# Patient Record
Sex: FEMALE | Race: WHITE | NOT HISPANIC OR LATINO | Employment: OTHER | URBAN - METROPOLITAN AREA
[De-identification: names, ages, dates, MRNs, and addresses within clinical notes are randomized per-mention and may not be internally consistent; named-entity substitution may affect disease eponyms.]

---

## 2017-01-05 ENCOUNTER — COMMUNICATION - HEALTHEAST (OUTPATIENT)
Dept: SURGERY | Facility: CLINIC | Age: 62
End: 2017-01-05

## 2017-01-20 ENCOUNTER — OFFICE VISIT - HEALTHEAST (OUTPATIENT)
Dept: SURGERY | Facility: CLINIC | Age: 62
End: 2017-01-20

## 2017-01-20 DIAGNOSIS — R63.2 HYPERPHAGIA: ICD-10-CM

## 2017-01-20 DIAGNOSIS — K91.2 POSTOPERATIVE MALABSORPTION: ICD-10-CM

## 2017-01-20 DIAGNOSIS — R73.09 ELEVATED HEMOGLOBIN A1C: ICD-10-CM

## 2017-01-20 ASSESSMENT — MIFFLIN-ST. JEOR: SCORE: 1552.76

## 2017-02-07 ENCOUNTER — AMBULATORY - HEALTHEAST (OUTPATIENT)
Dept: SURGERY | Facility: CLINIC | Age: 62
End: 2017-02-07

## 2017-02-07 DIAGNOSIS — E11.9 TYPE 2 DIABETES MELLITUS (H): ICD-10-CM

## 2017-02-21 ENCOUNTER — AMBULATORY - HEALTHEAST (OUTPATIENT)
Dept: SURGERY | Facility: CLINIC | Age: 62
End: 2017-02-21

## 2017-02-21 DIAGNOSIS — E11.9 TYPE 2 DIABETES MELLITUS (H): ICD-10-CM

## 2017-02-21 DIAGNOSIS — R73.09 ELEVATED HEMOGLOBIN A1C: ICD-10-CM

## 2017-02-28 ENCOUNTER — COMMUNICATION - HEALTHEAST (OUTPATIENT)
Dept: SURGERY | Facility: CLINIC | Age: 62
End: 2017-02-28

## 2017-02-28 DIAGNOSIS — Z98.84 STATUS POST BARIATRIC SURGERY: ICD-10-CM

## 2017-05-01 ENCOUNTER — OFFICE VISIT (OUTPATIENT)
Dept: OPHTHALMOLOGY | Facility: CLINIC | Age: 62
End: 2017-05-01

## 2017-05-01 VITALS — HEIGHT: 64 IN | BODY MASS INDEX: 35.85 KG/M2 | WEIGHT: 210 LBS

## 2017-05-01 DIAGNOSIS — G43.719 INTRACTABLE CHRONIC MIGRAINE WITHOUT AURA AND WITHOUT STATUS MIGRAINOSUS: ICD-10-CM

## 2017-05-01 DIAGNOSIS — H02.839 DERMATOCHALASIS: Primary | ICD-10-CM

## 2017-05-01 DIAGNOSIS — H02.413 MECHANICAL PTOSIS, BILATERAL: ICD-10-CM

## 2017-05-01 RX ORDER — POLYETHYLENE GLYCOL 3350 17 G/17G
17 POWDER, FOR SOLUTION ORAL EVERY MORNING
COMMUNITY
Start: 2016-12-22

## 2017-05-01 RX ORDER — MAGNESIUM 200 MG
TABLET ORAL
COMMUNITY
Start: 2015-01-09

## 2017-05-01 RX ORDER — BIOTIN 10 MG
2 TABLET ORAL EVERY MORNING
COMMUNITY
Start: 2013-05-23

## 2017-05-01 RX ORDER — TRAMADOL HYDROCHLORIDE 50 MG/1
50 TABLET ORAL EVERY 8 HOURS PRN
COMMUNITY
Start: 2015-12-30 | End: 2023-07-28

## 2017-05-01 RX ORDER — PROCHLORPERAZINE MALEATE 5 MG
5 TABLET ORAL
COMMUNITY
Start: 2016-12-30

## 2017-05-01 RX ORDER — BUTALBITAL, ASPIRIN AND CAFFEINE 50; 325; 40 MG/1; MG/1; MG/1
TABLET ORAL
COMMUNITY
Start: 2016-12-29 | End: 2023-03-24

## 2017-05-01 RX ORDER — CYCLOBENZAPRINE HCL 10 MG
TABLET ORAL
COMMUNITY
Start: 2016-12-22 | End: 2022-05-04

## 2017-05-01 RX ORDER — CODEINE PHOSPHATE/GUAIFENESIN 10-100MG/5
5 LIQUID (ML) ORAL
COMMUNITY
Start: 2014-12-23 | End: 2023-03-24

## 2017-05-01 RX ORDER — MECLIZINE HCL 12.5 MG 12.5 MG/1
12.5 TABLET ORAL
COMMUNITY
Start: 2016-09-20 | End: 2017-07-18

## 2017-05-01 RX ORDER — ASPIRIN 81 MG/1
81 TABLET ORAL
COMMUNITY
Start: 2010-12-28 | End: 2023-03-24

## 2017-05-01 RX ORDER — ATORVASTATIN CALCIUM 40 MG/1
40 TABLET, FILM COATED ORAL
COMMUNITY
Start: 2016-12-22 | End: 2022-05-04

## 2017-05-01 RX ORDER — OXYCODONE HYDROCHLORIDE 5 MG/1
5 TABLET ORAL EVERY 6 HOURS PRN
COMMUNITY
Start: 2016-07-17 | End: 2023-03-24

## 2017-05-01 RX ORDER — LISINOPRIL 20 MG/1
20 TABLET ORAL EVERY MORNING
COMMUNITY
Start: 2016-12-22 | End: 2022-05-04

## 2017-05-01 ASSESSMENT — MARGIN REFLEX DISTANCE
OD_MRD1: 2.5
OS_MRD1: 2.5

## 2017-05-01 ASSESSMENT — TONOMETRY
OS_IOP_MMHG: 12
IOP_METHOD: ICARE
OD_IOP_MMHG: 14

## 2017-05-01 ASSESSMENT — CONF VISUAL FIELD
OS_NORMAL: 1
METHOD: COUNTING FINGERS
OD_NORMAL: 1

## 2017-05-01 ASSESSMENT — VISUAL ACUITY
OS_SC+: -2
METHOD: SNELLEN - LINEAR
OD_SC+: -1
OD_SC: 20/20
OS_SC: 20/20

## 2017-05-01 ASSESSMENT — EXTERNAL EXAM - LEFT EYE: OS_EXAM: NORMAL

## 2017-05-01 ASSESSMENT — EXTERNAL EXAM - RIGHT EYE: OD_EXAM: NORMAL

## 2017-05-01 ASSESSMENT — LAGOPHTHALMOS
OD_LAGOPHTHALMOS: 0
OS_LAGOPHTHALMOS: 0

## 2017-05-01 NOTE — PATIENT INSTRUCTIONS
BLEPHAROPLASTY    Your eyes are often the first thing people notice about you and are an important aspect of your overall appearance. As we age, the tone and shape of our eyelids can loosen and sag. Heredity and sun exposure also contribute to this process. This excess, puffy or lax skin can make you appear more tired or appear older. Eyelid surgery or blepharoplasty (pronounced  gbaf-z-lo-plasty ) can give the eyes a more youthful look by removing excess skin, bulging fat, and lax muscle from the upper or lower eyelids. If the sagging upper eyelid skin obstructs peripheral vision, blepharoplasty can eliminate the obstruction and expand the visual field.     Upper Blepharoplasty     For the upper eyelids, excess skin and fat are removed through an incision hidden in the natural eyelid crease. If the lid is droopy (ptosis), the muscle that raises the upper eyelid can be tightened. The incision is then closed with fine sutures.     Lower Blepharoplasty     Fat in the lower eyelids can be removed or repositioned through an incision hidden on the inner surface of the eyelid.  If there is excessive skin in the lower lid, the skin can be removed through incision is made just below the lashes. Fat can be removed or repositioned through this incision, and the excess skin removed. The incision is then closed with fine sutures.     Upper and Lower Blepharoplasty     Upper and lower blepharoplasty can be performed together and also can be combined with other procedures such as eyebrow or forehead lift, midface lift, face lift, neck lift, or laser skin resurfacing.  The procedures are typically performed as an outpatient procedure and typically take 45 min to 1.5 hours to perform.  Most patients can return to normal activities within 1-2 weeks. Makeup may be worn to camouflage any bruising after one week.       Who Should Perform A Blepharoplasty?     When choosing a surgeon to perform blepharoplasty, look for a cosmetic and  reconstructive surgeon who specializes in the eyelids, orbit, and tear drain system. Dr. Dominguez s membership in the American Society of Ophthalmic Plastic and Reconstructive Surgery (ASOPRS) indicates he is not only a board certified ophthalmologist who knows the anatomy and structure of the eyelids and orbit, but also has had extensive training in ophthalmic plastic reconstructive and cosmetic surgery.

## 2017-05-01 NOTE — PROGRESS NOTES
Oculoplastic Clinic New Patient    Patient: Corby Bradley MRN# [id number]   YOB: 1955 Age: 61 year old   Date of Visit: May 1, 2017    CC: Droopy eyelids obstructing vision.  Chief Complaints and History of Present Illnesses   Patient presents with     Dermatochalasis Evaluation                 HPI:     Corby Bradley is a 61 year old female who has noted gradual onset of droopy eyelids over the past years. The droopy eyelid is interfering with activities of daily living including driving, and reading. The patient denies double vision, variability of the eyelid position, or significant dry eye symptoms. Not using ATs currently. Pt was referred by Katerin Terry, who told her that Dr. Dominguez's lid surgery could help with lid issues and possibly help with migraines as well. Pt reports hx of migraine headaches but denies any other significant PMH or POH.     Hx of LASIK and microblading of brows. No other eye/eyelid surgeries.     EXAM:     MRD1: 2.5/2.5  Dermatochalasis with excess skin touching eyelashes     VISUAL FIELD:  Right eye untaped: 10 degrees Right eye taped: 43 degrees  Left eye untaped: 7 degrees Left eye taped: 43 degrees    PHOTOS DEMONSTRATE:  Significant dermatochalasis with lids resting on eyelashes and obstructing visual axis      ANTICOAGULATION:  Aspirin 81mg daily  Supplements - fish oil, biotin, calcium, vit B, multivitamin    Can hold prior to surgery - YES  Will cc PCP regarding holding anticoagulation    Assessment & Plan     Corby Bradley is a 61 year old female with the following diagnoses:   1. Dermatochalasis    2. Mechanical ptosis, bilateral    3. Intractable chronic migraine without aura and without status migrainosus      PLAN:  Bilateral upper blepharoplasty (skin+nfp+brassiere)         Attending Physician Attestation:  I have seen and examined this patient .  I have confirmed and edited as necessary the chief complaint(s), history of present illness, review of  systems, relevant history, and examination findings as documented by others.  I have personally reviewed the relevant tests, images, and reports as documented above.  I have confirmed and edited as necessary the assessment and plan and agree with this note.    - Sourav Dominguez MD 10:15 AM 5/1/2017    Photographs were obtained to document the findings recorded under exam. These will be stored for future reference and comparison. The plan is documented under assessment and plan above.   - Sourav Dominguez MD 10:15 AM 5/1/2017    Today with Corby Bradley, I reviewed the indications, risks, benefits, and alternatives of the proposed surgical procedure including, but not limited to, failure obtain the desired result  and need for additional surgery, bleeding, infection, loss of vision, loss of the eye, and the remote possibility of permanent damage to any organ system or death with the use of anesthesia.  I provided multiple opportunities for the questions, answered all questions to the best of my ability, and confirmed that my answers and my discussion were understood.   - Sourav Dominguez MD 10:15 AM 5/1/2017

## 2017-05-01 NOTE — LETTER
2017         RE:  :  MRN: Corby Bradley  1955  [id number]     Dear Dr. Coni Brasher,    Thank you for asking me to see your patient, Corby rBadley, for an oculoplastic   consultation.  My assessment and plan are below.  For further details, please see my attached clinic note.      Assessment & Plan     Corby Bradley is a 61 year old female with the following diagnoses:   1. Dermatochalasis    2. Mechanical ptosis, bilateral    3. Intractable chronic migraine without aura and without status migrainosus      PLAN:  Bilateral upper blepharoplasty      Again, thank you for allowing me to participate in the care of your patient.      Sincerely,    Sourav Dominguez MD  Department of Ophthalmology and Visual Neurosciences  [facility]    CC: Alyson You  [facility] [address]  VIA Mail

## 2017-05-01 NOTE — MR AVS SNAPSHOT
After Visit Summary   5/1/2017    Corby Bradley    MRN: [id number]           Patient Information     Date Of Birth          1955        Visit Information        Provider Department      5/1/2017 9:00 AM Sourav Dominguez MD [facility] Ophthalmology        Today's Diagnoses     Dermatochalasis    -  1    Mechanical ptosis, bilateral        Intractable chronic migraine without aura and without status migrainosus          Care Instructions    BLEPHAROPLASTY    Your eyes are often the first thing people notice about you and are an important aspect of your overall appearance. As we age, the tone and shape of our eyelids can loosen and sag. Heredity and sun exposure also contribute to this process. This excess, puffy or lax skin can make you appear more tired or appear older. Eyelid surgery or blepharoplasty (pronounced  jzyf-n-bd-plasty ) can give the eyes a more youthful look by removing excess skin, bulging fat, and lax muscle from the upper or lower eyelids. If the sagging upper eyelid skin obstructs peripheral vision, blepharoplasty can eliminate the obstruction and expand the visual field.     Upper Blepharoplasty     For the upper eyelids, excess skin and fat are removed through an incision hidden in the natural eyelid crease. If the lid is droopy (ptosis), the muscle that raises the upper eyelid can be tightened. The incision is then closed with fine sutures.     Lower Blepharoplasty     Fat in the lower eyelids can be removed or repositioned through an incision hidden on the inner surface of the eyelid.  If there is excessive skin in the lower lid, the skin can be removed through incision is made just below the lashes. Fat can be removed or repositioned through this incision, and the excess skin removed. The incision is then closed with fine sutures.     Upper and Lower Blepharoplasty     Upper and lower blepharoplasty can be performed together and also can be combined with other procedures such  as eyebrow or forehead lift, midface lift, face lift, neck lift, or laser skin resurfacing.  The procedures are typically performed as an outpatient procedure and typically take 45 min to 1.5 hours to perform.  Most patients can return to normal activities within 1-2 weeks. Makeup may be worn to camouflage any bruising after one week.       Who Should Perform A Blepharoplasty?     When choosing a surgeon to perform blepharoplasty, look for a cosmetic and reconstructive surgeon who specializes in the eyelids, orbit, and tear drain system. Dr. Dominguez s membership in the American Society of Ophthalmic Plastic and Reconstructive Surgery (ASOPRS) indicates he is not only a board certified ophthalmologist who knows the anatomy and structure of the eyelids and orbit, but also has had extensive training in ophthalmic plastic reconstructive and cosmetic surgery.            Follow-ups after your visit        Who to contact     Please call your clinic at [phone] to:    Ask questions about your health    Make or cancel appointments    Discuss your medicines    Learn about your test results    Speak to your doctor   If you have compliments or concerns about an experience at your clinic, or if you wish to file a complaint, please contact [facility] Patient Relations at [phone] or email us at [email or web address]         Additional Information About Your Visit        Owlparrot Information     Owl[employer]rot gives you secure access to your electronic health record. If you see a primary care provider, you can also send messages to your care team and make appointments. If you have questions, please call your primary care clinic.  If you do not have a primary care provider, please call [phone] and they will assist you.      Owl[employer]rot is an electronic gateway that provides easy, online access to your medical records. With [employer], you can request a clinic appointment, read your test results,  "renew a prescription or communicate with your care team.     To access your existing account, please contact your [facility] or call [phone] for assistance.        Care EveryWhere ID     This is your Care EveryWhere ID. This could be used by other organizations to access your [place] medical records  [id number]        Your Vitals Were     Height BMI (Body Mass Index)                1.626 m (5' 4\") 36.05 kg/m2           Blood Pressure from Last 3 Encounters:   No data found for BP    Weight from Last 3 Encounters:   05/01/17 95.3 kg (210 lb)              We Performed the Following     External Photos OU (both eyes)     [place] VF Ptosis OU     [place]-Operative Worksheet (Plastics)        Primary Care Provider    None Specified       No primary provider on file.        Thank you!     Thank you for choosing [facility] OPHTHALMOLOGY  for your care. Our goal is always to provide you with excellent care. Hearing back from our patients is one way we can continue to improve our services. Please take a few minutes to complete the written survey that you may receive in the mail after your visit with us. Thank you!             Your Updated Medication List - Protect others around you: Learn how to safely use, store and throw away your medicines at www.disposemymeds.org.          This list is accurate as of: 5/1/17 10:19 AM.  Always use your most recent med list.                   Brand Name Dispense Instructions for use    aspirin EC 81 MG EC tablet      Take 81 mg by mouth       atorvastatin 40 MG tablet    LIPITOR     Take 40 mg by mouth       BIOTIN PO      Take 500 mcg by mouth       butalbital-aspirin-caffeine -40 MG Tabs per tablet    FIORINAL EQUIV     Take 1-2 tablets by mouth 1-2 time daily       Calcium Carb-Cholecalciferol 600-800 MG-UNIT Chew      Take 2 tablets by mouth       cyanocobalamin 1000 MCG Subl sublingual tablet      Take 1 tablet every other day.       " cyclobenzaprine 10 MG tablet    FLEXERIL     TAKE 1 TABLET AT BEDTIME FOR FIBROMYALGIA  AS NEEDED       guaiFENesin-codeine 100-10 MG/5ML Syrp syrup    guaiFENesin AC     Take 5 mLs by mouth       lisinopril 20 MG tablet    PRINIVIL/ZESTRIL     Take 20 mg by mouth       meclizine 12.5 MG tablet    ANTIVERT     Take 12.5 mg by mouth       metroNIDAZOLE 0.75 % cream    METROCREAM         MULTIVITAMIN ADULT Chew      Take 2 tablets by mouth       omeprazole 20 MG CR capsule    priLOSEC     Take 20 mg by mouth       oxyCODONE 5 MG IR tablet    ROXICODONE     Take 5 mg by mouth       polyethylene glycol powder    MIRALAX/GLYCOLAX     17 g       prochlorperazine 5 MG tablet    COMPAZINE     Take 5 mg by mouth       traMADol 50 MG tablet    ULTRAM

## 2017-05-01 NOTE — NURSING NOTE
Chief Complaints and History of Present Illnesses   Patient presents with     Dermatochalasis Evaluation     HPI    Affected eye(s):  Both   Symptoms:     No blurred vision      Frequency:  Constant       Do you have eye pain now?:  No      Comments:  Pt states has had droopy lids and would like it fixed, pt states it could cause some of her migraines too. No drops.    Mendy Yeung COT 9:02 AM May 1, 2017

## 2017-07-18 ENCOUNTER — ANESTHESIA EVENT (OUTPATIENT)
Dept: SURGERY | Facility: AMBULATORY SURGERY CENTER | Age: 62
End: 2017-07-18

## 2017-07-19 ENCOUNTER — SURGERY (OUTPATIENT)
Age: 62
End: 2017-07-19

## 2017-07-19 ENCOUNTER — HOSPITAL ENCOUNTER (OUTPATIENT)
Facility: AMBULATORY SURGERY CENTER | Age: 62
End: 2017-07-19
Attending: OPHTHALMOLOGY

## 2017-07-19 ENCOUNTER — ANESTHESIA (OUTPATIENT)
Dept: SURGERY | Facility: AMBULATORY SURGERY CENTER | Age: 62
End: 2017-07-19

## 2017-07-19 VITALS
RESPIRATION RATE: 16 BRPM | HEIGHT: 64 IN | HEART RATE: 75 BPM | TEMPERATURE: 97.2 F | OXYGEN SATURATION: 95 % | SYSTOLIC BLOOD PRESSURE: 135 MMHG | BODY MASS INDEX: 35.85 KG/M2 | WEIGHT: 210 LBS | DIASTOLIC BLOOD PRESSURE: 86 MMHG

## 2017-07-19 DIAGNOSIS — Z98.890 POSTOPERATIVE EYE STATE: Primary | ICD-10-CM

## 2017-07-19 RX ORDER — HYDROCODONE BITARTRATE AND ACETAMINOPHEN 5; 325 MG/1; MG/1
1 TABLET ORAL EVERY 6 HOURS PRN
Qty: 10 TABLET | Refills: 0 | Status: SHIPPED | OUTPATIENT
Start: 2017-07-19 | End: 2023-03-24

## 2017-07-19 RX ORDER — ONDANSETRON 2 MG/ML
INJECTION INTRAMUSCULAR; INTRAVENOUS PRN
Status: DISCONTINUED | OUTPATIENT
Start: 2017-07-19 | End: 2017-07-19

## 2017-07-19 RX ORDER — SODIUM CHLORIDE, SODIUM LACTATE, POTASSIUM CHLORIDE, CALCIUM CHLORIDE 600; 310; 30; 20 MG/100ML; MG/100ML; MG/100ML; MG/100ML
INJECTION, SOLUTION INTRAVENOUS CONTINUOUS
Status: DISCONTINUED | OUTPATIENT
Start: 2017-07-19 | End: 2017-07-20 | Stop reason: HOSPADM

## 2017-07-19 RX ORDER — LIDOCAINE 40 MG/G
CREAM TOPICAL
Status: DISCONTINUED | OUTPATIENT
Start: 2017-07-19 | End: 2017-07-19 | Stop reason: HOSPADM

## 2017-07-19 RX ORDER — SODIUM CHLORIDE, SODIUM LACTATE, POTASSIUM CHLORIDE, CALCIUM CHLORIDE 600; 310; 30; 20 MG/100ML; MG/100ML; MG/100ML; MG/100ML
INJECTION, SOLUTION INTRAVENOUS CONTINUOUS
Status: DISCONTINUED | OUTPATIENT
Start: 2017-07-19 | End: 2017-07-19 | Stop reason: HOSPADM

## 2017-07-19 RX ORDER — LIDOCAINE HYDROCHLORIDE 20 MG/ML
INJECTION, SOLUTION INFILTRATION; PERINEURAL PRN
Status: DISCONTINUED | OUTPATIENT
Start: 2017-07-19 | End: 2017-07-19

## 2017-07-19 RX ORDER — ONDANSETRON 4 MG/1
4 TABLET, ORALLY DISINTEGRATING ORAL EVERY 30 MIN PRN
Status: DISCONTINUED | OUTPATIENT
Start: 2017-07-19 | End: 2017-07-20 | Stop reason: HOSPADM

## 2017-07-19 RX ORDER — NALOXONE HYDROCHLORIDE 0.4 MG/ML
.1-.4 INJECTION, SOLUTION INTRAMUSCULAR; INTRAVENOUS; SUBCUTANEOUS
Status: DISCONTINUED | OUTPATIENT
Start: 2017-07-19 | End: 2017-07-20 | Stop reason: HOSPADM

## 2017-07-19 RX ORDER — PROPOFOL 10 MG/ML
INJECTION, EMULSION INTRAVENOUS PRN
Status: DISCONTINUED | OUTPATIENT
Start: 2017-07-19 | End: 2017-07-19

## 2017-07-19 RX ORDER — ERYTHROMYCIN 5 MG/G
OINTMENT OPHTHALMIC PRN
Status: DISCONTINUED | OUTPATIENT
Start: 2017-07-19 | End: 2017-07-19 | Stop reason: HOSPADM

## 2017-07-19 RX ORDER — TETRACAINE HYDROCHLORIDE 5 MG/ML
SOLUTION OPHTHALMIC PRN
Status: DISCONTINUED | OUTPATIENT
Start: 2017-07-19 | End: 2017-07-19 | Stop reason: HOSPADM

## 2017-07-19 RX ORDER — BUPIVACAINE HYDROCHLORIDE 5 MG/ML
INJECTION, SOLUTION PERINEURAL PRN
Status: DISCONTINUED | OUTPATIENT
Start: 2017-07-19 | End: 2017-07-19 | Stop reason: HOSPADM

## 2017-07-19 RX ORDER — ACETAMINOPHEN 325 MG/1
975 TABLET ORAL ONCE
Status: COMPLETED | OUTPATIENT
Start: 2017-07-19 | End: 2017-07-19

## 2017-07-19 RX ORDER — ERYTHROMYCIN 5 MG/G
OINTMENT OPHTHALMIC
Qty: 3.5 G | Refills: 0 | Status: SHIPPED | OUTPATIENT
Start: 2017-07-19

## 2017-07-19 RX ORDER — ONDANSETRON 2 MG/ML
4 INJECTION INTRAMUSCULAR; INTRAVENOUS EVERY 30 MIN PRN
Status: DISCONTINUED | OUTPATIENT
Start: 2017-07-19 | End: 2017-07-20 | Stop reason: HOSPADM

## 2017-07-19 RX ADMIN — ONDANSETRON 4 MG: 2 INJECTION INTRAMUSCULAR; INTRAVENOUS at 12:19

## 2017-07-19 RX ADMIN — ACETAMINOPHEN 975 MG: 325 TABLET ORAL at 11:17

## 2017-07-19 RX ADMIN — PROPOFOL 20 MG: 10 INJECTION, EMULSION INTRAVENOUS at 12:21

## 2017-07-19 RX ADMIN — PROPOFOL 10 MG: 10 INJECTION, EMULSION INTRAVENOUS at 12:27

## 2017-07-19 RX ADMIN — PROPOFOL 20 MG: 10 INJECTION, EMULSION INTRAVENOUS at 12:23

## 2017-07-19 RX ADMIN — SODIUM CHLORIDE, SODIUM LACTATE, POTASSIUM CHLORIDE, CALCIUM CHLORIDE: 600; 310; 30; 20 INJECTION, SOLUTION INTRAVENOUS at 12:10

## 2017-07-19 RX ADMIN — PROPOFOL 30 MG: 10 INJECTION, EMULSION INTRAVENOUS at 12:56

## 2017-07-19 RX ADMIN — PROPOFOL 20 MG: 10 INJECTION, EMULSION INTRAVENOUS at 12:26

## 2017-07-19 RX ADMIN — TETRACAINE HYDROCHLORIDE 2 DROP: 5 SOLUTION OPHTHALMIC at 12:21

## 2017-07-19 RX ADMIN — PROPOFOL 30 MG: 10 INJECTION, EMULSION INTRAVENOUS at 12:58

## 2017-07-19 RX ADMIN — ERYTHROMYCIN 1 G: 5 OINTMENT OPHTHALMIC at 12:43

## 2017-07-19 RX ADMIN — PROPOFOL 40 MG: 10 INJECTION, EMULSION INTRAVENOUS at 12:24

## 2017-07-19 RX ADMIN — PROPOFOL 30 MG: 10 INJECTION, EMULSION INTRAVENOUS at 12:25

## 2017-07-19 RX ADMIN — BUPIVACAINE HYDROCHLORIDE 2.7 ML: 5 INJECTION, SOLUTION PERINEURAL at 12:30

## 2017-07-19 RX ADMIN — LIDOCAINE HYDROCHLORIDE 100 MG: 20 INJECTION, SOLUTION INFILTRATION; PERINEURAL at 12:19

## 2017-07-19 NOTE — BRIEF OP NOTE
[facility] Brief Operative Note    Pre-operative diagnosis: Dermatochalasis   Post-operative diagnosis Dermatochalasis   Procedure: Procedure(s):  Bilateral Upper Blepharoplasty - Wound Class: I-Clean   Surgeon: Sourav Dominguez MD   Assistants(s):   Chhaya Paez MD   Estimated blood loss: Minimal    Specimens: None   Findings: NA

## 2017-07-19 NOTE — ANESTHESIA CARE TRANSFER NOTE
Patient: Corby Bradley    Procedure(s):  Bilateral Upper Blepharoplasty - Wound Class: I-Clean    Diagnosis: Dermatochalasis  Diagnosis Additional Information: No value filed.    Anesthesia Type:   MAC     Note:  Airway :Room Air  Patient transferred to:Phase II  Comments: VSS/WNL. Responds well.      Vitals: (Last set prior to Anesthesia Care Transfer)    CRNA VITALS  7/19/2017 1241 - 7/19/2017 1315      7/19/2017             Ht Rate: 79    Resp Rate (set): 10                Electronically Signed By: ZBIGNIEW Foreman CRNA  July 19, 2017  1:15 PM

## 2017-07-19 NOTE — ANESTHESIA PREPROCEDURE EVALUATION
Anesthesia Evaluation     .             ROS/MED HX    ENT/Pulmonary:     (+)sleep apnea, , . .    Neurologic:  - neg neurologic ROS     Cardiovascular:     (+) hypertension----. : . . . :. .       METS/Exercise Tolerance:     Hematologic:  - neg hematologic  ROS       Musculoskeletal:  - neg musculoskeletal ROS       GI/Hepatic:  - neg GI/hepatic ROS       Renal/Genitourinary:  - ROS Renal section negative       Endo:     (+) type II DM Obesity, .      Psychiatric:  - neg psychiatric ROS       Infectious Disease:  - neg infectious disease ROS       Malignancy:      - no malignancy   Other:    - neg other ROS                 Physical Exam  Normal systems: cardiovascular, pulmonary and dental    Airway   Mallampati: I  TM distance: >3 FB  Neck ROM: full    Dental     Cardiovascular       Pulmonary                     Anesthesia Plan      History & Physical Review  History and physical reviewed and following examination; no interval change.    ASA Status:  2 .    NPO Status:  > 8 hours    Plan for MAC with Intravenous induction. Maintenance will be TIVA.  Reason for MAC:  Deep or markedly invasive procedure (G8) and Procedure to face, neck, head or breast  PONV prophylaxis:  Ondansetron (or other 5HT-3)       Postoperative Care  Postoperative pain management:  IV analgesics and Oral pain medications.      Consents  Anesthetic plan, risks, benefits and alternatives discussed with:  Patient..                          .

## 2017-07-19 NOTE — DISCHARGE INSTRUCTIONS
[facility]  Home Care Following Anesthesia  For 24 hours after surgery:  1. Get plenty of rest.  A responsible adult must stay with you for at least 24 hours after you leave the surgery center.  2. Do not drive or use heavy equipment.  If you have weakness or tingling, don't drive or use heavy equipment until this feeling goes away.   3. Do not drink alcohol.   4. Avoid strenuous or risky activities.  Ask for help when climbing stairs.  5. You may feel lightheaded.  IF so, sit for a few minutes before standing.  Have someone help you get up.   6. If you have nausea (feel sick to your stomach): Drink only clear liquids such as apple juice, ginger ale, broth or 7-Up.  Rest may also help.  Be sure to drink enough fluids.  Move to a regular diet as you feel able.   7. You may have a slight fever.  Call the doctor if your fever is over 100 F (37.7 C) (taken under the tongue) or lasts longer than 24 hours.  8. You may have a dry mouth, a sore throat, muscle aches or trouble sleeping. These should go away after 24 hours.  9. Do not make important or legal decisions.   Call a doctor for any of the followin. Signs of infection (fever, growing tenderness at the surgery site, a large amount of drainage or bleeding, severe pain, foul-smelling drainage, redness, swelling).  2. It has been over 8 to 10 hours since surgery and you are still not able to urinate (pass water).    Post-operative Instructions    Ophthalmic Plastic and Reconstructive Surgery  Sourav Dominguez M.D.  Chhaya Terrazas M.D.    All instructions apply to the operated eye(s) or eyelid(s)      What to expect after surgery:    Thre will be some swelling, bruising, and likely a black eye (even into the lower eyelids and cheeks). Also expect crusting and discharge from the eye and/or incisions.     A small amount of surface bleeding is normal for the first 48 hours after surgery.    You may notice some bloody tears for  the first few days after surgery. This is normal.    Your eye(s) and eyelid(s) may be painful and tender. This is normal after surgery. Use the pain medication as prescribed. If your pain does not improve despite the medication, contact the office.    Wound care and personal care:    If a patch or bandage has been placed, please leave this in place until seen in clinic. Prevent the bandage from getting wet.     Apply ice compresses 15 minutes on 15 minutes off while awake for the first 2 days after surgery, then switch to warm compresses 4 times a day until seen by your physician.     For warm packs you can place a cup of dry uncooked rice in a clean cotton sock. Place sock in microwave 30 seconds to one minute. Next place the warm sock into a plastic bag and wrap the bag with clean warm wet washcloth and place over operated eye.      You may shower or wash your hair the day after surgery. Do not bathe or go swimming for 1 week to prevent contamination of your wounds.    Do not apply make-up to the eyes or eyelids for 2 weeks after surgery.      Activity restrictions and driving:    Avoid heavy lifting, bending, exercise or strenuous activity for 1 week after surgery.    You may resume other activities and return to work as tolerated.    You may not resume driving until have you stopped using narcotic pain medications(such as Norco, Percocet, Tylenol #3).    Medications:    Restart all your regular home medications and eye drops today. If you take Plavix or Aspirin on a regular basis, wait for 3 days after your surgery before restarting these in order to decrease the risk of bleeding complications.    Avoid aspirin and aspirin-like medications (Motrin, Aleve, Ibuprofen, Vanessa-    [place] etc) for 5 days to reduce the risk of bleeding. You may take Tylenol    (acetaminophen) for pain.    In addition to your home medications, take the following post-operative medications as prescribed by your physician:    Apply  antibiotic ointment (erythromycin) to all sutures three times a day, and into the operated eye(s) at night.     Take 1 to 2 pain pills (norco or tylenol 3 as prescribed) as needed for pain up to every 4 hours.    The pain pills may make you drowsy. You must not drive a car, operate heavy machinery or drink alcohol while taking them.    The pain pills may cause constipation and nausea. Take them with some food to prevent a stomach upset. If you continue to experience nausea, call your physician.      WARNING: All the prescription pain medications listed above contain Tylenol (acetaminophen). You must not take more than 4,000 mg of acetaminophen per 24-hour period. This is equivalent to 6 tablets of Darvocet, 8 tablets of Vicodin, or 12 tablets of Norco, Percocet or Tylenol #3. If you take other over-the-counter medications containing acetaminophen, you must take the amount of acetaminophen into account and reduce the number of prescribed pain pills accordingly.    Contact information and follow-up:    Return to the Eye Clinic for a follow-up appointment with your physician as  scheduled. If no appointment has been scheduled, call [phone] for an  appointment with Dr. oDminguez within 1 to 2 weeks from your date of surgery.    Your doctor is:  Dr. Sourav Dominguez, Ophthalmology: [phone]                  Or dial [phone] and ask for the resident on call for:  Ophthalmology  For emergency care, call the:  [facility]:  [phone] (TTY for hearing impaired: [phone])

## 2017-07-19 NOTE — IP AVS SNAPSHOT
[facility]    [address] [place] [id number]    Phone:  [phone]    Fax:  [phone]                                       After Visit Summary   7/19/2017    Corby Bradley    MRN: [id number]           After Visit Summary Signature Page     I have received my discharge instructions, and my questions have been answered. I have discussed any challenges I see with this plan with the nurse or doctor.    ..........................................................................................................................................  Patient/Patient Representative Signature      ..........................................................................................................................................  Patient Representative Print Name and Relationship to Patient    ..................................................               ................................................  Date                                            Time    ..........................................................................................................................................  Reviewed by Signature/Title    ...................................................              ..............................................  Date                                                            Time

## 2017-07-19 NOTE — IP AVS SNAPSHOT
MRN:[id number]                      After Visit Summary   7/19/2017    Corby Bradley    MRN: [id number]           Thank you!     Thank you for choosing [facility] for your care. Our goal is always to provide you with excellent care. Hearing back from our patients is one way we can continue to improve our services. Please take a few minutes to complete the written survey that you may receive in the mail after you visit with us. Thank you!        Patient Information     Date Of Birth          1955        About your hospital stay     You were admitted on:  July 19, 2017 You last received care in the:  [facility]    You were discharged on:  July 19, 2017       Who to Call     For medical emergencies, please call 911.  For non-urgent questions about your medical care, please call your primary care provider or clinic, None  For questions related to your surgery, please call your surgery clinic        Attending Provider     Provider Sourav Barrett MD Ophthalmology       Primary Care Provider    None Specified      After Care Instructions     Discharge Medication Instructions       Do NOT take aspirin or medications containing NSAIDS for 72 hours after procedure.            Ice to affected area       Apply cold pack for 15 minutes on, 15 minutes off, for 48 hours while awake.                  Your next 10 appointments already scheduled     Aug 07, 2017  9:15 AM CDT   (Arrive by 9:00 AM)   Post-Op with Sourav Dominguez MD   [facility] ([facility])    [address] [place] [address] [phone]              Further instructions from your care team       [facility]  Home Care Following Anesthesia  For 24 hours after surgery:  1. Get plenty of rest.  A responsible adult must stay with you for at least 24 hours after you leave the surgery center.  2. Do not  drive or use heavy equipment.  If you have weakness or tingling, don't drive or use heavy equipment until this feeling goes away.   3. Do not drink alcohol.   4. Avoid strenuous or risky activities.  Ask for help when climbing stairs.  5. You may feel lightheaded.  IF so, sit for a few minutes before standing.  Have someone help you get up.   6. If you have nausea (feel sick to your stomach): Drink only clear liquids such as apple juice, ginger ale, broth or 7-Up.  Rest may also help.  Be sure to drink enough fluids.  Move to a regular diet as you feel able.   7. You may have a slight fever.  Call the doctor if your fever is over 100 F (37.7 C) (taken under the tongue) or lasts longer than 24 hours.  8. You may have a dry mouth, a sore throat, muscle aches or trouble sleeping. These should go away after 24 hours.  9. Do not make important or legal decisions.   Call a doctor for any of the followin. Signs of infection (fever, growing tenderness at the surgery site, a large amount of drainage or bleeding, severe pain, foul-smelling drainage, redness, swelling).  2. It has been over 8 to 10 hours since surgery and you are still not able to urinate (pass water).    Post-operative Instructions    Ophthalmic Plastic and Reconstructive Surgery  Sourav Dominguez M.D.  Chhaya Terrazas M.D.    All instructions apply to the operated eye(s) or eyelid(s)      What to expect after surgery:    Thre will be some swelling, bruising, and likely a black eye (even into the lower eyelids and cheeks). Also expect crusting and discharge from the eye and/or incisions.     A small amount of surface bleeding is normal for the first 48 hours after surgery.    You may notice some bloody tears for the first few days after surgery. This is normal.    Your eye(s) and eyelid(s) may be painful and tender. This is normal after surgery. Use the pain medication as prescribed. If your pain does not improve despite the medication, contact the  office.    Wound care and personal care:    If a patch or bandage has been placed, please leave this in place until seen in clinic. Prevent the bandage from getting wet.     Apply ice compresses 15 minutes on 15 minutes off while awake for the first 2 days after surgery, then switch to warm compresses 4 times a day until seen by your physician.     For warm packs you can place a cup of dry uncooked rice in a clean cotton sock. Place sock in microwave 30 seconds to one minute. Next place the warm sock into a plastic bag and wrap the bag with clean warm wet washcloth and place over operated eye.      You may shower or wash your hair the day after surgery. Do not bathe or go swimming for 1 week to prevent contamination of your wounds.    Do not apply make-up to the eyes or eyelids for 2 weeks after surgery.      Activity restrictions and driving:    Avoid heavy lifting, bending, exercise or strenuous activity for 1 week after surgery.    You may resume other activities and return to work as tolerated.    You may not resume driving until have you stopped using narcotic pain medications(such as Norco, Percocet, Tylenol #3).    Medications:    Restart all your regular home medications and eye drops today. If you take Plavix or Aspirin on a regular basis, wait for 3 days after your surgery before restarting these in order to decrease the risk of bleeding complications.    Avoid aspirin and aspirin-like medications (Motrin, Aleve, Ibuprofen, Vanessa-    Colp etc) for 5 days to reduce the risk of bleeding. You may take Tylenol    (acetaminophen) for pain.    In addition to your home medications, take the following post-operative medications as prescribed by your physician:    Apply antibiotic ointment (erythromycin) to all sutures three times a day, and into the operated eye(s) at night.     Take 1 to 2 pain pills (norco or tylenol 3 as prescribed) as needed for pain up to every 4 hours.    The pain pills may make you  "drowsy. You must not drive a car, operate heavy machinery or drink alcohol while taking them.    The pain pills may cause constipation and nausea. Take them with some food to prevent a stomach upset. If you continue to experience nausea, call your physician.      WARNING: All the prescription pain medications listed above contain Tylenol (acetaminophen). You must not take more than 4,000 mg of acetaminophen per 24-hour period. This is equivalent to 6 tablets of Darvocet, 8 tablets of Vicodin, or 12 tablets of Norco, Percocet or Tylenol #3. If you take other over-the-counter medications containing acetaminophen, you must take the amount of acetaminophen into account and reduce the number of prescribed pain pills accordingly.    Contact information and follow-up:    Return to the Eye Clinic for a follow-up appointment with your physician as  scheduled. If no appointment has been scheduled, call [phone] for an  appointment with Dr. Dominguez within 1 to 2 weeks from your date of surgery.    Your doctor is:  Dr. Sourav Dominguez, Ophthalmology: [phone]                  Or dial [phone] and ask for the resident on call for:  Ophthalmology  For emergency care, call the:  [facility]:  [phone] (TTY for hearing impaired: [phone])      Pending Results     No orders found from 7/17/2017 to 7/20/2017.            Admission Information     Date & Time Provider Department Dept. Phone    7/19/2017 Sourav Dominguez MD [facility] [phone]      Your Vitals Were     Blood Pressure Temperature Respirations Height Weight Pulse Oximetry    144/83 97.7  F (36.5  C) (Oral) 16 1.626 m (5' 4\") 95.3 kg (210 lb) 97%    BMI (Body Mass Index)                   36.05 kg/m2           [employer] Information     [employer] gives you secure access to your electronic health record. If you see a primary care provider, you can also send messages to your care team and make appointments. " If you have questions, please call your primary care clinic.  If you do not have a primary care provider, please call [phone] and they will assist you.      [employer] is an electronic gateway that provides easy, online access to your medical records. With [employer] you can request a clinic appointment, read your test results, renew a prescription or communicate with your care team.     To access your existing account, please contact your [facility] or call [phone] for assistance.        Care EveryWhere ID     This is your Care EveryWhere ID. This could be used by other organizations to access your [facility] medical records  [id number]        Equal Access to Services     YURIY HICKEY : Brady Jennings, shaye cardona, renuka hernandez, juanpablo gongora . So [facility] [phone].    ATENCIÓN: Si habla español, tiene a walters disposición servicios gratuitos de asistencia lingüística. Llame al [phone].    We comply with applicable federal civil rights laws and [place] laws. We do not discriminate on the basis of race, color, national origin, age, disability sex, sexual orientation or gender identity.               Review of your medicines      START taking        Dose / Directions    HYDROcodone-acetaminophen 5-325 MG per tablet   Commonly known as:  NORCO   Used for:  Postoperative eye state        Dose:  1 tablet   Take 1 tablet by mouth every 6 hours as needed for pain Maximum of 4000 mg of acetaminophen in 24 hours.   Quantity:  10 tablet   Refills:  0         CONTINUE these medicines which have NOT CHANGED        Dose / Directions    aspirin EC 81 MG EC tablet        Dose:  81 mg   Take 81 mg by mouth   Refills:  0       atorvastatin 40 MG tablet   Commonly known as:  LIPITOR        Dose:  40 mg   Take 40 mg by mouth   Refills:  0       BIOTIN PO        Dose:  500 mcg   Take 500 mcg by mouth daily   Refills:  0        butalbital-aspirin-caffeine -40 MG Tabs per tablet   Commonly known as:  FIORINAL EQUIV        Take 1-2 tablets by mouth 1-2 time daily PRN   Refills:  0       Calcium Carb-Cholecalciferol 600-800 MG-UNIT Chew        Dose:  2 tablet   Take 2 tablets by mouth   Refills:  0       cyanocobalamin 1000 MCG Subl sublingual tablet        Take 1 tablet every other day.   Refills:  0       cyclobenzaprine 10 MG tablet   Commonly known as:  FLEXERIL        TAKE 1 TABLET AT BEDTIME FOR FIBROMYALGIA  AS NEEDED   Refills:  0       guaiFENesin-codeine 100-10 MG/5ML Syrp syrup   Commonly known as:  guaiFENesin AC        Dose:  5 mL   Take 5 mLs by mouth   Refills:  0       lisinopril 20 MG tablet   Commonly known as:  PRINIVIL/ZESTRIL        Dose:  20 mg   Take 20 mg by mouth every morning   Refills:  0       metroNIDAZOLE 0.75 % cream   Commonly known as:  METROCREAM        Apply topically daily   Refills:  0       MULTIVITAMIN ADULT Chew        Dose:  2 tablet   Take 2 tablets by mouth every morning   Refills:  0       omeprazole 20 MG CR capsule   Commonly known as:  priLOSEC        Dose:  20 mg   Take 20 mg by mouth every morning   Refills:  0       oxyCODONE 5 MG IR tablet   Commonly known as:  ROXICODONE        Dose:  5 mg   Take 5 mg by mouth every 6 hours as needed   Refills:  0       polyethylene glycol powder   Commonly known as:  MIRALAX/GLYCOLAX        Dose:  17 g   Take 17 g by mouth every morning   Refills:  0       prochlorperazine 5 MG tablet   Commonly known as:  COMPAZINE        Dose:  5 mg   Take 5 mg by mouth   Refills:  0       traMADol 50 MG tablet   Commonly known as:  ULTRAM        Dose:  50 mg   Take 50 mg by mouth every 8 hours as needed   Refills:  0            Where to get your medicines      Some of these will need a paper prescription and others can be bought over the counter. Ask your nurse if you have questions.     Bring a paper prescription for each of these medications      HYDROcodone-acetaminophen 5-325 MG per tablet                Protect others around you: Learn how to safely use, store and throw away your medicines at www.disposemymeds.org.             Medication List: This is a list of all your medications and when to take them. Check marks below indicate your daily home schedule. Keep this list as a reference.      Medications           Morning Afternoon Evening Bedtime As Needed    aspirin EC 81 MG EC tablet   Take 81 mg by mouth                                atorvastatin 40 MG tablet   Commonly known as:  LIPITOR   Take 40 mg by mouth                                BIOTIN PO   Take 500 mcg by mouth daily                                butalbital-aspirin-caffeine -40 MG Tabs per tablet   Commonly known as:  FIORINAL EQUIV   Take 1-2 tablets by mouth 1-2 time daily PRN                                Calcium Carb-Cholecalciferol 600-800 MG-UNIT Chew   Take 2 tablets by mouth                                cyanocobalamin 1000 MCG Subl sublingual tablet   Take 1 tablet every other day.                                cyclobenzaprine 10 MG tablet   Commonly known as:  FLEXERIL   TAKE 1 TABLET AT BEDTIME FOR FIBROMYALGIA  AS NEEDED                                guaiFENesin-codeine 100-10 MG/5ML Syrp syrup   Commonly known as:  guaiFENesin AC   Take 5 mLs by mouth                                HYDROcodone-acetaminophen 5-325 MG per tablet   Commonly known as:  NORCO   Take 1 tablet by mouth every 6 hours as needed for pain Maximum of 4000 mg of acetaminophen in 24 hours.                                lisinopril 20 MG tablet   Commonly known as:  PRINIVIL/ZESTRIL   Take 20 mg by mouth every morning                                metroNIDAZOLE 0.75 % cream   Commonly known as:  METROCREAM   Apply topically daily                                MULTIVITAMIN ADULT Chew   Take 2 tablets by mouth every morning                                omeprazole 20 MG CR capsule   Commonly  known as:  priLOSEC   Take 20 mg by mouth every morning                                oxyCODONE 5 MG IR tablet   Commonly known as:  ROXICODONE   Take 5 mg by mouth every 6 hours as needed                                polyethylene glycol powder   Commonly known as:  MIRALAX/GLYCOLAX   Take 17 g by mouth every morning                                prochlorperazine 5 MG tablet   Commonly known as:  COMPAZINE   Take 5 mg by mouth                                traMADol 50 MG tablet   Commonly known as:  ULTRAM   Take 50 mg by mouth every 8 hours as needed

## 2017-07-19 NOTE — OP NOTE
PREOPERATIVE DIAGNOSIS: Bilateral upper eyelid dermatochalasis.   POSTOPERATIVE DIAGNOSIS: Bilateral upper eyelid dermatochalasis.   PROCEDURE: Bilateral upper blepharoplasty.   SURGEON: Sourav Dominguez MD.   ASSISTANT: Chhaya Terrazas MD   ASSISTANT: Humberto Paez MD    ANESTHESIA: Monitored with local infiltration of a 50/50 mixture of 2% lidocaine with epinephrine and 0.5% Marcaine.   COMPLICATIONS: None.   ESTIMATED BLOOD LOSS: Less than 5 mL.   HISTORY: Corby Bradley  presented with upper lid dermatochalasis leading to mechanical ptosis of the upper lids, blocking the superior visual field and interfering with  activities of daily living. After the risks, benefits and alternatives to the proposed procedure were explained, informed consent was obtained.   DESCRIPTION OF PROCEDURE: Corby Bradley was brought to the operating room and placed supine on the operating table. IV sedation was given. The upper lid crease and excess upper eyelid skin was marked with marking pen and infiltrated with local anesthetic. The area was prepped and draped in the typical fashion. Attention was directed to the right side. Skin was incised following marked lines. Skin flap was excised with a high temperature cautery. A row of cautery was placed in the orbicularis along the inferior incision line.   The orbicularis and septum were opened nasally. A small amount of the nasal fat pad was excised with the cautery. The orbicularis was divided laterally with the cautery. Multiple 5-0 chromic gut sutures were used to secure the superior edge of orbicularis to the arcus marginalis to support the lateral brow. Hemostasis was obtained and the skin closed with running 6-0 plain gut suture. Attention was directed to the left side where the same procedure was performed.  Ophthalmic antibiotic ointment was applied to the eyelids and into the eyes. Corby Bradley tolerated the procedure well and left the operating room in stable condition.      Sourav Dominguez MD

## 2017-07-19 NOTE — ANESTHESIA POSTPROCEDURE EVALUATION
Patient: Corby Bradley    Procedure(s):  Bilateral Upper Blepharoplasty - Wound Class: I-Clean    Diagnosis:Dermatochalasis  Diagnosis Additional Information: No value filed.    Anesthesia Type:  MAC    Note:  Anesthesia Post Evaluation    Patient location during evaluation: PACU  Patient participation: Able to fully participate in evaluation  Level of consciousness: awake  Pain management: adequate  Airway patency: patent  Cardiovascular status: acceptable  Respiratory status: acceptable  Hydration status: balanced  PONV: none     Anesthetic complications: None          Last vitals:  Vitals:    07/19/17 1056 07/19/17 1315 07/19/17 1331   BP: 144/83 127/84 135/86   Pulse:  70 75   Resp: 16 16 16   Temp: 36.5  C (97.7  F) 36.2  C (97.2  F) 36.2  C (97.2  F)   SpO2: 97% 96% 95%         Electronically Signed By: Mason Dove MD  July 19, 2017  3:57 PM

## 2017-07-20 ENCOUNTER — TELEPHONE (OUTPATIENT)
Dept: OPHTHALMOLOGY | Facility: CLINIC | Age: 62
End: 2017-07-20

## 2017-07-20 NOTE — TELEPHONE ENCOUNTER
POD1  A telephone call was made to Corby Bradley to make sure the post operative course has been uneventful and that all questions were answered. There was no answer and a telephone message was left.    Chhaya Terrazas

## 2017-08-07 ENCOUNTER — OFFICE VISIT (OUTPATIENT)
Dept: OPHTHALMOLOGY | Facility: CLINIC | Age: 62
End: 2017-08-07

## 2017-08-07 DIAGNOSIS — H02.834 DERMATOCHALASIS OF BOTH UPPER EYELIDS: Primary | ICD-10-CM

## 2017-08-07 DIAGNOSIS — Z98.890 POSTOPERATIVE EYE STATE: ICD-10-CM

## 2017-08-07 DIAGNOSIS — H02.831 DERMATOCHALASIS OF BOTH UPPER EYELIDS: Primary | ICD-10-CM

## 2017-08-07 ASSESSMENT — VISUAL ACUITY
CORRECTION_TYPE: GLASSES
OD_CC: 20/20
OS_CC+: -3
OS_CC: 20/20
METHOD: SNELLEN - LINEAR

## 2017-08-07 ASSESSMENT — TONOMETRY
IOP_METHOD: ICARE
OD_IOP_MMHG: 13
OS_IOP_MMHG: 12

## 2017-08-07 NOTE — MR AVS SNAPSHOT
After Visit Summary   8/7/2017    Corby Bradley    MRN: [id number]           Patient Information     Date Of Birth          1955        Visit Information        Provider Department      8/7/2017 9:15 AM Soruav Dominguez MD [facility]        Today's Diagnoses     Dermatochalasis of both upper eyelids    -  1    Postoperative eye state           Follow-ups after your visit        Follow-up notes from your care team     Return in about 6 weeks (around 9/18/2017) for RETURN OCULOPLASTICS.      Your next 10 appointments already scheduled     Sep 25, 2017 10:00 AM CDT   (Arrive by 9:45 AM)   Post-Op with Sourav Dominguez MD   [facility] ([facility])    [address]  [address] [facility] [address] [phone]              Who to contact     Please call your clinic at [phone] to:    Ask questions about your health    Make or cancel appointments    Discuss your medicines    Learn about your test results    Speak to your doctor   If you have compliments or concerns about an experience at your clinic, or if you wish to file a complaint, please contact [facility] Patient Relations at [phone] or email us at [email or web address]         Additional Information About Your Visit        MyChart Information     [employer] gives you secure access to your electronic health record. If you see a primary care provider, you can also send messages to your care team and make appointments. If you have questions, please call your primary care clinic.  If you do not have a primary care provider, please call [phone] and they will assist you.      [employer] is an electronic gateway that provides easy, online access to your medical records. With [employer], you can request a clinic appointment, read your test results, renew a prescription or communicate with your care team.     To access your existing account, please  contact your [facility] or call [phone] for assistance.        Care EveryWhere ID     This is your Care EveryWhere ID. This could be used by other organizations to access your [facility] medical records  [id number]         Blood Pressure from Last 3 Encounters:   07/19/17 135/86    Weight from Last 3 Encounters:   07/19/17 95.3 kg (210 lb)   05/01/17 95.3 kg (210 lb)              Today, you had the following     No orders found for display       Primary Care Provider    None Specified       No primary provider on file.        Equal Access to Services     [facility]: Hadii aad nesha hadasho Sokimani, waaxda luqadaha, qaybta kaalmada mary, juanpablo gongora . So [facility] [phone].    ATENCIÓN: Si habla español, tiene a walters disposición servicios gratuitos de asistencia lingüística. Llame al [phone].    We comply with applicable federal civil rights laws and [place] laws. We do not discriminate on the basis of race, color, national origin, age, disability sex, sexual orientation or gender identity.            Thank you!     Thank you for choosing [facility]  for your care. Our goal is always to provide you with excellent care. Hearing back from our patients is one way we can continue to improve our services. Please take a few minutes to complete the written survey that you may receive in the mail after your visit with us. Thank you!             Your Updated Medication List - Protect others around you: Learn how to safely use, store and throw away your medicines at www.disposemymeds.org.          This list is accurate as of: 8/7/17 10:04 AM.  Always use your most recent med list.                   Brand Name Dispense Instructions for use Diagnosis    aspirin EC 81 MG EC tablet      Take 81 mg by mouth        atorvastatin 40 MG tablet    LIPITOR     Take 40 mg by mouth        BIOTIN PO      Take 500 mcg by mouth daily         butalbital-aspirin-caffeine -40 MG Tabs per tablet    FIORINAL EQUIV     Take 1-2 tablets by mouth 1-2 time daily PRN        Calcium Carb-Cholecalciferol 600-800 MG-UNIT Chew      Take 2 tablets by mouth        cyanocobalamin 1000 MCG Subl sublingual tablet      Take 1 tablet every other day.        cyclobenzaprine 10 MG tablet    FLEXERIL     TAKE 1 TABLET AT BEDTIME FOR FIBROMYALGIA  AS NEEDED        erythromycin ophthalmic ointment    ROMYCIN    3.5 g    Apply small amount to incision sites three times daily until tube runs out.    Postoperative eye state       guaiFENesin-codeine 100-10 MG/5ML Syrp syrup    guaiFENesin AC     Take 5 mLs by mouth        HYDROcodone-acetaminophen 5-325 MG per tablet    NORCO    10 tablet    Take 1 tablet by mouth every 6 hours as needed for pain Maximum of 4000 mg of acetaminophen in 24 hours.    Postoperative eye state       lisinopril 20 MG tablet    PRINIVIL/ZESTRIL     Take 20 mg by mouth every morning        metroNIDAZOLE 0.75 % cream    METROCREAM     Apply topically daily        MULTIVITAMIN ADULT Chew      Take 2 tablets by mouth every morning        omeprazole 20 MG CR capsule    priLOSEC     Take 20 mg by mouth every morning        oxyCODONE 5 MG IR tablet    ROXICODONE     Take 5 mg by mouth every 6 hours as needed        polyethylene glycol powder    MIRALAX/GLYCOLAX     Take 17 g by mouth every morning        prochlorperazine 5 MG tablet    COMPAZINE     Take 5 mg by mouth        traMADol 50 MG tablet    ULTRAM     Take 50 mg by mouth every 8 hours as needed

## 2017-08-07 NOTE — PROGRESS NOTES
Corby Bradley is 3 weeks status post bilateral upper blepharoplasty   The incision(s) are healing well.  The lid(s)  are  in excellent position.    I have recommended:  * Continue antibiotic ointment or bland lubricating ointment (eg vaseline or aquaphor) to the incision site BID.  *Massage along the incision BID.  * Warm soaks QID until all edema and ecchymoses resolve  * Return to clinic in 6-8 weeks     Attending Physician Attestation:  I have seen and examined this patient.  I have confirmed and edited as necessary the chief complaint(s), history of present illness, review of systems, relevant history, and examination findings as documented by others.  I have personally reviewed the relevant tests, images, and reports as documented above.  I have confirmed and edited as necessary the assessment and plan and agree with this note.    - Sourav Dominguez MD 10:02 AM 8/7/2017

## 2017-08-07 NOTE — NURSING NOTE
Chief Complaints and History of Present Illnesses   Patient presents with     Post Op (Ophthalmology) Both Eyes     Bilateral upper blepharoplasty     HPI    Affected eye(s):  Both   Symptoms:     No blurred vision      Frequency:  Constant       Do you have eye pain now?:  No      Comments:  2.5 week postop s/p Bilateral upper blepharoplasty on 7/19/2017. Pt states healing well, pt notices some pain when putting the ointment on, otherwise doing well. Pt feels right eye has a larger lump.     Mendy Yeung COT 9:27 AM August 7, 2017

## 2017-09-25 ENCOUNTER — OFFICE VISIT (OUTPATIENT)
Dept: OPHTHALMOLOGY | Facility: CLINIC | Age: 62
End: 2017-09-25

## 2017-09-25 DIAGNOSIS — H02.831 DERMATOCHALASIS OF BOTH UPPER EYELIDS: Primary | ICD-10-CM

## 2017-09-25 DIAGNOSIS — H02.834 DERMATOCHALASIS OF BOTH UPPER EYELIDS: Primary | ICD-10-CM

## 2017-09-25 DIAGNOSIS — Z98.890 POSTOPERATIVE EYE STATE: ICD-10-CM

## 2017-09-25 ASSESSMENT — EXTERNAL EXAM - RIGHT EYE: OD_EXAM: HEALED

## 2017-09-25 ASSESSMENT — VISUAL ACUITY
CORRECTION_TYPE: GLASSES
OD_CC: 20/20
OS_CC: 20/15
METHOD: SNELLEN - LINEAR

## 2017-09-25 ASSESSMENT — MARGIN REFLEX DISTANCE
OS_MRD1: 4
OD_MRD1: 4

## 2017-09-25 ASSESSMENT — EXTERNAL EXAM - LEFT EYE: OS_EXAM: HEALED

## 2017-09-25 ASSESSMENT — TONOMETRY
IOP_METHOD: ICARE
OD_IOP_MMHG: 11
OS_IOP_MMHG: 10

## 2017-09-25 ASSESSMENT — LAGOPHTHALMOS
OS_LAGOPHTHALMOS: 0
OD_LAGOPHTHALMOS: 0

## 2017-09-25 NOTE — NURSING NOTE
Chief Complaints and History of Present Illnesses   Patient presents with     Post Op (Ophthalmology) Both Eyes     status post bilateral upper blepharoplasty      HPI    Affected eye(s):  Both   Symptoms:     No blurred vision   Dryness      Frequency:  Constant       Do you have eye pain now?:  No      Comments:  Status post bilateral upper blepharoplasty 7/19/17. Lids are still a little lumpy but no eye pain. Uses ATs for dryness PRN OU.    Margarita Linares COT 10:01 AM September 25, 2017

## 2017-09-25 NOTE — PROGRESS NOTES
Corby Bradley is 8 weeks status post bilateral upper blepharoplasty   The incision(s) are healing well.  The lid(s)  are  in excellent position.    I have recommended:  * Continue antibiotic ointment or bland lubricating ointment (eg vaseline or aquaphor) to the incision site BID.  *Massage along the incision BID.  * Return to clinic as needed   Attending Physician Attestation:  I have seen and examined this patient.  I have confirmed and edited as necessary the chief complaint(s), history of present illness, review of systems, relevant history, and examination findings as documented by others.  I have personally reviewed the relevant tests, images, and reports as documented above.  I have confirmed and edited as necessary the assessment and plan and agree with this note.    - Sourav Dominguez MD 10:16 AM 9/25/2017

## 2017-09-25 NOTE — MR AVS SNAPSHOT
After Visit Summary   9/25/2017    Corby Bradley    MRN: [id number]           Patient Information     Date Of Birth          1955        Visit Information        Provider Department      9/25/2017 10:00 AM Sourav Dominguez MD [facility] Ophthalmology        Today's Diagnoses     Dermatochalasis of both upper eyelids    -  1    Postoperative eye state           Follow-ups after your visit        Follow-up notes from your care team     Return if symptoms worsen or fail to improve.      Who to contact     Please call your clinic at [phone] to:    Ask questions about your health    Make or cancel appointments    Discuss your medicines    Learn about your test results    Speak to your doctor   If you have compliments or concerns about an experience at your clinic, or if you wish to file a complaint, please contact [facility] Patient Relations at [phone] or email us at [email or web address]         Additional Information About Your Visit        MyChart Information     [employer] gives you secure access to your electronic health record. If you see a primary care provider, you can also send messages to your care team and make appointments. If you have questions, please call your primary care clinic.  If you do not have a primary care provider, please call [phone] and they will assist you.      [employer] is an electronic gateway that provides easy, online access to your medical records. With [employer], you can request a clinic appointment, read your test results, renew a prescription or communicate with your care team.     To access your existing account, please contact your [facility] or call [phone] for assistance.        Care EveryWhere ID     This is your Care EveryWhere ID. This could be used by other organizations to access your [facility] medical records  [id number]         Blood Pressure from Last 3 Encounters:    07/19/17 135/86    Weight from Last 3 Encounters:   07/19/17 95.3 kg (210 lb)   05/01/17 95.3 kg (210 lb)              Today, you had the following     No orders found for display       Primary Care Provider    None Specified       No primary provider on file.        Equal Access to Services     YURIY HICKEY : Hadii aad ku hadmaria luisapaco Sokimani, waaxda luqadaha, qaybta kaalmada denzeljayshreeemma, juanpablo edgartatyanaleti gongora . So [facility] [phone].    ATENCIÓN: Si habla español, tiene a walters disposición servicios gratuitos de asistencia lingüística. Llame al [phone].    We comply with applicable federal civil rights laws and [place] laws. We do not discriminate on the basis of race, color, national origin, age, disability sex, sexual orientation or gender identity.            Thank you!     Thank you for choosing [facility] OPHTHALMOLOGY  for your care. Our goal is always to provide you with excellent care. Hearing back from our patients is one way we can continue to improve our services. Please take a few minutes to complete the written survey that you may receive in the mail after your visit with us. Thank you!             Your Updated Medication List - Protect others around you: Learn how to safely use, store and throw away your medicines at www.disposemymeds.org.          This list is accurate as of: 9/25/17 10:17 AM.  Always use your most recent med list.                   Brand Name Dispense Instructions for use Diagnosis    aspirin EC 81 MG EC tablet      Take 81 mg by mouth        atorvastatin 40 MG tablet    LIPITOR     Take 40 mg by mouth        BIOTIN PO      Take 500 mcg by mouth daily        butalbital-aspirin-caffeine -40 MG Tabs per tablet    FIORINAL EQUIV     Take 1-2 tablets by mouth 1-2 time daily PRN        Calcium Carb-Cholecalciferol 600-800 MG-UNIT Chew      Take 2 tablets by mouth        cyanocobalamin 1000 MCG Subl sublingual tablet      Take 1 tablet every other day.         cyclobenzaprine 10 MG tablet    FLEXERIL     TAKE 1 TABLET AT BEDTIME FOR FIBROMYALGIA  AS NEEDED        erythromycin ophthalmic ointment    ROMYCIN    3.5 g    Apply small amount to incision sites three times daily until tube runs out.    Postoperative eye state       guaiFENesin-codeine 100-10 MG/5ML Syrp syrup    guaiFENesin AC     Take 5 mLs by mouth        HYDROcodone-acetaminophen 5-325 MG per tablet    NORCO    10 tablet    Take 1 tablet by mouth every 6 hours as needed for pain Maximum of 4000 mg of acetaminophen in 24 hours.    Postoperative eye state       lisinopril 20 MG tablet    PRINIVIL/ZESTRIL     Take 20 mg by mouth every morning        metroNIDAZOLE 0.75 % cream    METROCREAM     Apply topically daily        MULTIVITAMIN ADULT Chew      Take 2 tablets by mouth every morning        omeprazole 20 MG CR capsule    priLOSEC     Take 20 mg by mouth every morning        oxyCODONE 5 MG IR tablet    ROXICODONE     Take 5 mg by mouth every 6 hours as needed        polyethylene glycol powder    MIRALAX/GLYCOLAX     Take 17 g by mouth every morning        prochlorperazine 5 MG tablet    COMPAZINE     Take 5 mg by mouth        traMADol 50 MG tablet    ULTRAM     Take 50 mg by mouth every 8 hours as needed

## 2017-12-11 ENCOUNTER — AMBULATORY - HEALTHEAST (OUTPATIENT)
Dept: SURGERY | Facility: CLINIC | Age: 62
End: 2017-12-11

## 2017-12-11 DIAGNOSIS — Z98.84 HISTORY OF ROUX-EN-Y GASTRIC BYPASS: ICD-10-CM

## 2017-12-11 DIAGNOSIS — E11.9 DIABETES (H): ICD-10-CM

## 2017-12-11 DIAGNOSIS — E78.5 DYSLIPIDEMIA: ICD-10-CM

## 2017-12-11 DIAGNOSIS — K90.9 INTESTINAL MALABSORPTION: ICD-10-CM

## 2018-01-04 ENCOUNTER — OFFICE VISIT - HEALTHEAST (OUTPATIENT)
Dept: SURGERY | Facility: CLINIC | Age: 63
End: 2018-01-04

## 2018-01-04 DIAGNOSIS — K91.2 POSTOPERATIVE MALABSORPTION: ICD-10-CM

## 2018-01-04 DIAGNOSIS — E66.9 OBESITY (BMI 30-39.9): ICD-10-CM

## 2018-01-04 ASSESSMENT — MIFFLIN-ST. JEOR: SCORE: 1523.28

## 2018-01-21 ENCOUNTER — HEALTH MAINTENANCE LETTER (OUTPATIENT)
Age: 63
End: 2018-01-21

## 2018-11-27 ENCOUNTER — AMBULATORY - HEALTHEAST (OUTPATIENT)
Dept: SURGERY | Facility: CLINIC | Age: 63
End: 2018-11-27

## 2018-11-27 DIAGNOSIS — K90.9 INTESTINAL MALABSORPTION: ICD-10-CM

## 2018-11-27 DIAGNOSIS — Z98.84 HISTORY OF ROUX-EN-Y GASTRIC BYPASS: ICD-10-CM

## 2018-12-05 ENCOUNTER — COMMUNICATION - HEALTHEAST (OUTPATIENT)
Dept: SURGERY | Facility: CLINIC | Age: 63
End: 2018-12-05

## 2019-01-08 ENCOUNTER — OFFICE VISIT - HEALTHEAST (OUTPATIENT)
Dept: SURGERY | Facility: CLINIC | Age: 64
End: 2019-01-08

## 2019-01-08 DIAGNOSIS — K91.2 POSTOPERATIVE MALABSORPTION: ICD-10-CM

## 2019-01-08 ASSESSMENT — MIFFLIN-ST. JEOR: SCORE: 1450.7

## 2019-12-26 ENCOUNTER — COMMUNICATION - HEALTHEAST (OUTPATIENT)
Dept: SURGERY | Facility: CLINIC | Age: 64
End: 2019-12-26

## 2020-01-29 ENCOUNTER — AMBULATORY - HEALTHEAST (OUTPATIENT)
Dept: SURGERY | Facility: CLINIC | Age: 65
End: 2020-01-29

## 2020-01-29 DIAGNOSIS — K90.9 INTESTINAL MALABSORPTION: ICD-10-CM

## 2020-01-29 DIAGNOSIS — K91.2 POSTOPERATIVE MALABSORPTION: ICD-10-CM

## 2020-01-29 DIAGNOSIS — Z98.84 HISTORY OF ROUX-EN-Y GASTRIC BYPASS: ICD-10-CM

## 2020-02-11 ENCOUNTER — OFFICE VISIT - HEALTHEAST (OUTPATIENT)
Dept: SURGERY | Facility: CLINIC | Age: 65
End: 2020-02-11

## 2020-02-11 DIAGNOSIS — K91.2 POSTOPERATIVE MALABSORPTION: ICD-10-CM

## 2020-02-11 DIAGNOSIS — M79.7 FIBROMYALGIA: ICD-10-CM

## 2020-02-11 DIAGNOSIS — Z98.84 STATUS POST BARIATRIC SURGERY: ICD-10-CM

## 2020-02-11 DIAGNOSIS — Z78.0 POST-MENOPAUSAL: ICD-10-CM

## 2020-02-11 ASSESSMENT — MIFFLIN-ST. JEOR: SCORE: 1491.52

## 2020-03-10 ENCOUNTER — HEALTH MAINTENANCE LETTER (OUTPATIENT)
Age: 65
End: 2020-03-10

## 2020-12-27 ENCOUNTER — HEALTH MAINTENANCE LETTER (OUTPATIENT)
Age: 65
End: 2020-12-27

## 2021-03-10 ENCOUNTER — AMBULATORY - HEALTHEAST (OUTPATIENT)
Dept: SURGERY | Facility: CLINIC | Age: 66
End: 2021-03-10

## 2021-03-10 DIAGNOSIS — Z98.84 HISTORY OF ROUX-EN-Y GASTRIC BYPASS: ICD-10-CM

## 2021-03-10 DIAGNOSIS — K91.2 POSTOPERATIVE MALABSORPTION: ICD-10-CM

## 2021-03-10 DIAGNOSIS — K90.9 INTESTINAL MALABSORPTION: ICD-10-CM

## 2021-03-23 ENCOUNTER — COMMUNICATION - HEALTHEAST (OUTPATIENT)
Dept: SURGERY | Facility: CLINIC | Age: 66
End: 2021-03-23

## 2021-04-06 ENCOUNTER — OFFICE VISIT - HEALTHEAST (OUTPATIENT)
Dept: SURGERY | Facility: CLINIC | Age: 66
End: 2021-04-06

## 2021-04-06 DIAGNOSIS — K91.2 POSTOPERATIVE MALABSORPTION: ICD-10-CM

## 2021-04-06 DIAGNOSIS — Z78.0 POST-MENOPAUSAL: ICD-10-CM

## 2021-04-06 ASSESSMENT — MIFFLIN-ST. JEOR: SCORE: 1446.16

## 2021-04-24 ENCOUNTER — HEALTH MAINTENANCE LETTER (OUTPATIENT)
Age: 66
End: 2021-04-24

## 2021-05-26 ENCOUNTER — RECORDS - HEALTHEAST (OUTPATIENT)
Dept: ADMINISTRATIVE | Facility: CLINIC | Age: 66
End: 2021-05-26

## 2021-05-28 ENCOUNTER — RECORDS - HEALTHEAST (OUTPATIENT)
Dept: ADMINISTRATIVE | Facility: CLINIC | Age: 66
End: 2021-05-28

## 2021-05-29 ENCOUNTER — RECORDS - HEALTHEAST (OUTPATIENT)
Dept: ADMINISTRATIVE | Facility: CLINIC | Age: 66
End: 2021-05-29

## 2021-05-30 VITALS — BODY MASS INDEX: 38.16 KG/M2 | WEIGHT: 223.5 LBS | HEIGHT: 64 IN

## 2021-05-31 VITALS — HEIGHT: 64 IN | WEIGHT: 217 LBS | BODY MASS INDEX: 37.05 KG/M2

## 2021-06-02 VITALS — BODY MASS INDEX: 34.31 KG/M2 | WEIGHT: 201 LBS | HEIGHT: 64 IN

## 2021-06-04 VITALS
BODY MASS INDEX: 35.85 KG/M2 | OXYGEN SATURATION: 98 % | SYSTOLIC BLOOD PRESSURE: 171 MMHG | DIASTOLIC BLOOD PRESSURE: 88 MMHG | WEIGHT: 210 LBS | HEIGHT: 64 IN | RESPIRATION RATE: 18 BRPM | HEART RATE: 81 BPM

## 2021-06-05 VITALS — HEIGHT: 64 IN | BODY MASS INDEX: 34.15 KG/M2 | WEIGHT: 200 LBS

## 2021-06-05 NOTE — PROGRESS NOTES
9 yrs post op lab orders placed for patient and faxed to her [facility] in [place] (fax [phone]) in preparation for appointment with  in February.    Heather Powers RN, CBN  [facility]  P [phone]  F [phone]

## 2021-06-06 NOTE — PATIENT INSTRUCTIONS - HE
[facility] Bariatric Care  Nutritional Guidelines  Gastric Bypass 18 Months Post Op and Beyond    General Guidelines and Helpful Hints:    Eat 3 meals per day + protein supplement(s). No snacks between meals.  o Do not skip meals.  This can cause overeating at the next meal and will prevent adequate protein and nutritional intake.    Aim for 60-80 grams of protein per day.  o Always eat your protein first. This assists with optimal nutrition and helps you stay full longer.  o Depending on your portion size, you may need to drink approved protein supplement between meals to achieve protein goals. Follow recommendations of your Dietitian.     Eat your protein first, and then follow with fiber.   o It is not necessary to count your fiber, but 15-20 grams per day is recommended.    o Add fiber by including fruits, vegetables, whole grains, and beans.     Portions should remain about 1 cup per meal. Use measuring cups to be accurate.    Continue to use saucer/salad plates, infant/toddler silverware to keep portion sizes small and take small bites.    Eat S-L-O-W-L-Y to make each meal last 20-30 minutes. Always stop eating when satisfied.    Continue to use caution with foods containing skins, peels or membranes. Chew well!    Aim for 64 oz. of calorie-free fluids daily.  o Continue to avoid caffeine and carbonation. If you choose to drink alcohol, do so in moderation.   o Remember to avoid drinking during meals, 15-30 minutes before and 30 minutes after.    Exercise is salazar for continued weight loss and weight maintenance. Aim for 30-60 minutes of physical activity most days of the week. Include cardiovascular and strength training.    If having trouble tolerating meat, try using a crock-pot, tinfoil tent, steamer or other moist cooking method to create tender meats. Add broth or low-fat gravy to help meat stay moist.     Avoid high sugar and high fat foods to prevent dumping syndrome.  o Check nutrition labels for less  than 10 grams of sugar and less than 10 grams of fat per serving.    Continue Taking Vitamins/Minerals:  o 1762-7066 mcg of Sublingual B-12 daily  o 1 Multivitamin with Iron twice daily (chewable or swallow tabs)  o 500-600 mg Calcium Citrate twice daily (chewable or swallow tabs)  o 5000 IU Vitamin D3 daily    Sample Grocery List    Protein:    Fat free Greek or light yogurt (less than 10 grams sugar)    Fat free or low-fat cottage cheese    String cheese or reduced fat cheese slices    Tuna, salmon, crab, egg, or chicken salad made with light or fat free mayonnaise    Egg or Egg Substitute    Lean/extra lean turkey, beef, bison, venison (ground, sirloin, round, flank)    Pork loin or tenderloin (grilled, baked, broiled)    Fish such as salmon, tuna, trout, tilapia, etc. (grilled, baked, broiled)    Tender cuts of lean (skinless) turkey or chicken    Lean deli meats: turkey, lean ham, chicken, lean roast beef    Beans such as kidney, garbanzo, black, [place], or low-fat/fat free refried beans    Peanut butter (natural preferred). Limit to 1 Tbsp. per day.    Low-fat meatloaf (made with lean ground beef or turkey)    Sloppy Joes made with low-sugar ketchup and lean ground beef or turkey    Soy or vegetable protein (i.e. vegan crumbles, soy/veggie burger, tofu)    Hummus    Vegetables:    Fresh: cooked or raw (as tolerated)    Frozen vegetables    Canned vegetables (low sodium or no salt added, rinse before cooking/eating)    (Ok to have skins/peels/membranes/seeds - just chew well)    Fruits:    Fresh fruit    Frozen fruit (no sugar added)    Canned fruit (packed in its own juice, NOT syrup)    (Ok to have skins/peels/membranes/seeds - just chew well)    Starch:    Unsweetened whole-grain hot cereal (or high fiber cold cereal, dry)    Toasted whole wheat bread or [place] Thins    Whole grain crackers    Baked /boiled/mashed potato/sweet potato    Cooked whole grain pasta, brown rice, or other cooked whole  grains    Starchy vegetables: corn, peas, winter squash    Protein Supplement:     Ready to drink protein shake with:  o 15-30 grams protein per serving  o Less than 10 grams total carbohydrate per serving     Protein powder mixed with:  o  Skim or 1% milk  o Low fat or fat free Lactaid milk, plain or no sugar added soymilk  o Water     Fats: (use in moderation)    1 teaspoon of soft tub margarine    1 teaspoon olive oil, canola oil, or peanut oil    1 tablespoon of low-fat leblanc or salad dressing     Sample Menu for 18+ months after Gastric Bypass    You do NOT need to eat/drink the full portion sizes listed below  Always stop when you are satisfied    Breakfast   cup 1% cottage cheese     cup mixed berries   Lunch 2 oz lean roast beef on   [place] Thin with 1 tsp. light leblanc    small tomato, chopped, mixed with 1 tsp. light vinaigrette dressing   Supplement Approved protein supplement (if needed between meals)   Dinner 2 oz grilled salmon    cup salad greens with 1 tsp. light salad dressing and 1 tsp. ground flax seed    cup quinoa or brown rice     Breakfast   cup egg substitute with   cup sautéed chopped vegetables  2 light [place] Krisp crackers   Lunch Tuna Melt:   cup tuna mixed with 1 tsp. light leblanc over   Irons Thin. Top with 2-3 slices cucumber and 1 oz slice of low fat cheese   Supplement 1 cup skim milk (if needed between meals)   Dinner 3 oz  grilled, broiled, or baked seasoned skinless chicken breast    cup asparagus     Breakfast   cup plain oatmeal made with skim or 1% milk with 1 Tbsp. flavored/unflavored protein powder added  1 mozzarella string cheese   Lunch 2 oz deli turkey breast  1/3 cup salad with 1 tsp. light salad dressing, 1/8 of a whole avocado and 1 Tbsp. sunflower seeds   Dinner 3 oz. pork loin made in a crock pot, seasoned with a spice rub    cup cooked carrots   Supplement Approved protein supplement (if needed between meals)     Breakfast 1 cup breakfast casserole made with egg  substitute, turkey sausage,  and steamed, chopped bell peppers   Supplement  1 cup light Greek yogurt (if needed between meals)   Lunch 2 oz. teriyaki turkey    cup mashed sweet potato with 1-2 spritzes of spray butter (like [place])    cup fresh pineapple   Dinner 3 oz low fat meatloaf    cup roasted garlic zucchini     Breakfast   cup leftover breakfast casserole    cup no sugar added applesauce with 1 Tbsp. unflavored protein powder and a sprinkle of cinnamon    Lunch 3 oz shrimp with 1-2 Tbsp. low-sugar cocktail sauce for dipping    c. whole wheat pasta drizzled with   tsp. olive oil   Supplement 1 cup skim/1% milk with scoop of protein powder (if needed between meals)   Dinner Grilled, seasoned kebob with 2 oz lean beef and   cup vegetables     Breakfast Breakfast pizza:   [place] Thin spread with 1 Tbsp. low sugar spaghetti sauce,   cup shredded low fat cheese, melted and 1 slice of Welsh morton     cup fresh fruit mixed with chopped almonds   Lunch   cup black bean soup  4-5 whole grain crackers   Dinner 3 oz  tilapia with lemon pepper seasoning    cup stewed tomatoes   Supplement 1 string cheese (if needed between meals)     Breakfast 2 hard boiled eggs (discard 1 egg yolk)    whole wheat English Muffin with 1 tsp. low sugar jelly   Lunch   cup leftover black bean soup topped with 1-2 Tbsp. low fat cheese  2-3 light [place] Krisp crackers   Supplement Approved protein supplement (if needed between meals)   Dinner 3 oz sirloin steak    cup steamed broccoli     Call [phone] to schedule your bone density scan

## 2021-06-06 NOTE — PROGRESS NOTES
Bariatric Follow Up Visit with a History of Previous Bariatric Surgery     Date of visit: 2/11/2020  Physician: Tamica Giarldo MD  Primary Care Provider:  Ely Hussein MD Terry ANKIT Gagandeepjohannebraydon   64 y.o.  female    Date of Surgery: 12/8/2018  Initial Weight: 280#  Initial BMI:   Today's Weight:   Wt Readings from Last 1 Encounters:   02/11/20 210 lb (95.3 kg)     Body mass index is 35.77 kg/m .      Assessment and Plan     Assessment: Corby is a 64 y.o. year old female who is 9 yrs s/p  Paul en Y Gastric Bypass with Dr. Diana Bradley feels as if she has achieved the goals she hoped to accomplish through bariatric surgery and weight loss.    Encounter Diagnoses   Name Primary?     Postoperative malabsorption Yes     Fibromyalgia      Status post bariatric surgery      Post-menopausal          Current Outpatient Medications:      aspirin 81 MG EC tablet, Take 81 mg by mouth daily., Disp: , Rfl:      atorvastatin (LIPITOR) 40 MG tablet, Take 40 mg by mouth., Disp: , Rfl:      biotin 5 mg Tab, Take 500 mcg by mouth every other day., Disp: , Rfl:      butalbital-aspirin-caffeine (FIORINAL) -40 mg Tab per tablet, Take 1 tablet by mouth every 4 (four) hours as needed., Disp: , Rfl:      calcium carbonate-vitamin D3 600 mg(1,500mg) -400 unit Chew, Chew 1 tablet 2 (two) times a day., Disp: , Rfl:      calcium citrate-vitamin D3 500 mg calcium -400 unit Chew, Chew 1 tablet daily., Disp: , Rfl:      carboxymethylcellulose (REFRESH PLUS) 0.5 % Dpet ophthalmic dropperette, Administer 1 drop to both eyes as needed., Disp: , Rfl:      cyanocobalamin, vitamin B-12, (VITAMIN B-12) 1,000 mcg Subl, Take 1 tablet every other day., Disp: , Rfl:      cyclobenzaprine (FLEXERIL) 10 MG tablet, Take 10 mg by mouth at bedtime. , Disp: , Rfl:      ESTRACE 0.01 % (0.1 mg/gram) vaginal cream, Insert 1 application into the vagina 2 (two) times a week., Disp: , Rfl: 1     lisinopril (PRINIVIL,ZESTRIL) 20 MG tablet, Take 20 mg  "by mouth., Disp: , Rfl:      metFORMIN (GLUCOPHAGE) 500 MG tablet, Take 1 tablet by mouth 2 (two) times a day., Disp: , Rfl: 0     metroNIDAZOLE (METROCREAM) 0.75 % cream, 1 application daily. , Disp: , Rfl:      multivitamin Chew, Chew 1 tablet 2 (two) times a day., Disp: , Rfl:      omeprazole (PRILOSEC) 20 MG capsule, Take 1 capsule (20 mg total) by mouth daily., Disp: 90 capsule, Rfl: 3     polyethylene glycol (GLYCOLAX) 17 gram/dose powder, Take 17 g by mouth daily., Disp: , Rfl:      prochlorperazine (COMPAZINE) 5 MG tablet, Take 5 mg by mouth., Disp: , Rfl:      traMADol (ULTRAM) 50 mg tablet, Take 1-2 tablets every 6 hours as needed for fibromyalgia pain, Disp: 90 tablet, Rfl: prn     traZODone (DESYREL) 50 MG tablet, Take 50 mg by mouth at bedtime., Disp: , Rfl:     Plan: Continue vitamins with consistency. Reviewed labs. Remote DEXA . Maternal G'mother had osteoporosis.   DEXA.     Return in about 1 year (around 2/11/2021).    Bariatric Surgery Review     Interim History/LifeChanges: Maintains a 70# weight loss. Vitamin intake excellent.     Patient Concerns: follow up  Appetite (1-10): Ok  GERD: no    Medication changes: trazodone for sleep    Vitamin Intake:   B-12   SL   MVI  2/d   Vitamin D  5,000U   Calcium   citrate     Other  biotin              LABS: \"Reviewed  excellent  Nausea no  Vomiting no  Constipation low motility  Diarrhea no  Rashes no  Hair Loss no  Calf tenderness no  Breathing difficulty no  Reactive Hypoglycemia yes  Light Headedness occ   Moods OK stale    12 point ROS as above and otherwise negative      Habits:  Alcohol: 0-1/yr  Tobacco: no  Caffeine 1/2 caf 1-2/d  NSAIDS daily  Exercise Routine: walking 4 days a week 30-45 weights that she uses 3-4X/wk  3 meals/day yes  Protein first yes  60+ grams/day  Water Separate from meals yes  Calorie Containing Beverages no  Restaurant eating/wk 0-1  Sleeping 8 hours/night  Stress low  CPAP: no  Contraception: PM    Social History "     Social History     Socioeconomic History     Marital status:      Spouse name: Not on file     Number of children: Not on file     Years of education: Not on file     Highest education level: Not on file   Occupational History     Not on file   Social Needs     Financial resource strain: Not on file     Food insecurity:     Worry: Not on file     Inability: Not on file     Transportation needs:     Medical: Not on file     Non-medical: Not on file   Tobacco Use     Smoking status: Former Smoker     Smokeless tobacco: Never Used     Tobacco comment: Quit in 1986   Substance and Sexual Activity     Alcohol use: Yes     Alcohol/week: 0.0 standard drinks     Comment: Rarely     Drug use: No     Sexual activity: Yes     Partners: Male     Birth control/protection: Other   Lifestyle     Physical activity:     Days per week: Not on file     Minutes per session: Not on file     Stress: Not on file   Relationships     Social connections:     Talks on phone: Not on file     Gets together: Not on file     Attends Sabianism service: Not on file     Active member of club or organization: Not on file     Attends meetings of clubs or organizations: Not on file     Relationship status: Not on file     Intimate partner violence:     Fear of current or ex partner: Not on file     Emotionally abused: Not on file     Physically abused: Not on file     Forced sexual activity: Not on file   Other Topics Concern     Not on file   Social History Narrative     Not on file       Past Medical History     Past Medical History:   Diagnosis Date     Back pain      Controlled type 2 diabetes mellitus without complication, without long-term current use of insulin (H) 1/1/2018     Fibromyalgia, primary      History of anesthesia complications     difficult to wake up/bad headache once     History of Paul-en-Y gastric bypass 2010     Hyperlipemia      Hypertension      Iron deficiency      Knee pain      Migraine      Morbid obesity (H)       Postoperative malabsorption      Sleep apnea      Urinary incontinence      Problem List     Patient Active Problem List   Diagnosis     Fibromyalgia, primary     Back pain     Knee pain     Migraine     Obesity (BMI 30-39.9)     History of Paul-en-Y gastric bypass     Postoperative malabsorption     Controlled type 2 diabetes mellitus without complication, without long-term current use of insulin (H)     Hypertension     Hypertriglyceridemia     Irritable bowel syndrome     Lactose intolerance     Lumbar disc disease     Pure hypercholesterolemia     Rosacea     Sleep apnea     Status post gastric bypass for obesity     Medications     Current Outpatient Medications   Medication Sig Note     aspirin 81 MG EC tablet Take 81 mg by mouth daily.      atorvastatin (LIPITOR) 40 MG tablet Take 40 mg by mouth. 12/30/2015: Received from: [facility]     biotin 5 mg Tab Take 500 mcg by mouth every other day. 1/4/2018: Received from: [facility] Received Sig: Take 1 capsule by mouth once daily.     butalbital-aspirin-caffeine (FIORINAL) -40 mg Tab per tablet Take 1 tablet by mouth every 4 (four) hours as needed.      calcium carbonate-vitamin D3 600 mg(1,500mg) -400 unit Chew Chew 1 tablet 2 (two) times a day. 1/4/2018: Received from: [facility] Received Sig: Take 2 tablets by mouth.     calcium citrate-vitamin D3 500 mg calcium -400 unit Chew Chew 1 tablet daily.      carboxymethylcellulose (REFRESH PLUS) 0.5 % Dpet ophthalmic dropperette Administer 1 drop to both eyes as needed.      cyanocobalamin, vitamin B-12, (VITAMIN B-12) 1,000 mcg Subl Take 1 tablet every other day. 12/30/2015: Received from: [facility]     cyclobenzaprine (FLEXERIL) 10 MG tablet Take 10 mg by mouth at bedtime.  12/30/2015: Received from: [facility]     ESTRACE 0.01 % (0.1 mg/gram) vaginal cream Insert 1 application into the vagina 2 (two)  "times a week. 1/4/2018: Received from: External Pharmacy Received Sig: APPLY SMALL AMT AROUND VAGINAL OPENING 1-3X/WEEK AS NEEDED.     lisinopril (PRINIVIL,ZESTRIL) 20 MG tablet Take 20 mg by mouth. 12/30/2015: Received from: [facility]     metFORMIN (GLUCOPHAGE) 500 MG tablet Take 1 tablet by mouth 2 (two) times a day. 1/4/2018: Received from: External Pharmacy Received Sig: TAKE 1 TABLET BY MOUTH 2 TIMES DAILY WITH MEALS.     metroNIDAZOLE (METROCREAM) 0.75 % cream 1 application daily.  12/30/2015: Received from: [facility]     multivitamin Chew Chew 1 tablet 2 (two) times a day. 1/4/2018: Received from: [facility] Received Sig: Take 2 tablets by mouth once daily.     omeprazole (PRILOSEC) 20 MG capsule Take 1 capsule (20 mg total) by mouth daily.      polyethylene glycol (GLYCOLAX) 17 gram/dose powder Take 17 g by mouth daily. 1/4/2018: Received from: External Pharmacy     prochlorperazine (COMPAZINE) 5 MG tablet Take 5 mg by mouth. 12/30/2015: Received from: [facility]     traMADol (ULTRAM) 50 mg tablet Take 1-2 tablets every 6 hours as needed for fibromyalgia pain      traZODone (DESYREL) 50 MG tablet Take 50 mg by mouth at bedtime.      Surgical History     Past Surgical History  She has a past surgical history that includes Cholecystectomy; Appendectomy; Colonoscopy; LASIK; ORIF zygomatic fracture; Exploratory laparotomy; Abdominal adhesion surgery; Blepharoplasty (07/2017); and Paul-en-y procedure (12/8/2010).    Objective-Exam     Constitutional:  /88   Pulse 81   Resp 18   Ht 5' 4.25\" (1.632 m)   Wt 210 lb (95.3 kg)   SpO2 98%   BMI 35.77 kg/m    Height: 5' 4.25\" (1.632 m) (2/11/2020 10:28 AM)  Weight: 210 lb (95.3 kg) (2/11/2020 10:28 AM)  BMI (Calculated): 35.8 (2/11/2020 10:28 AM)  SpO2: 98 % (2/11/2020 10:28 AM)    General:  Pleasant and in no acute distress   Eyes:  EOMI  ENT:  Airway 1+  Moist mucous membranes  Neck:  " Supple, No LAD, No thyromegaly, No carotid bruits appreciated  Respiratory: Normal respiratory effort, no cough, wheezes or crackles  CV:  Regular rate and Rhythm one ectopic beat,no murmurs, pulses 2+, no calf tenderness, no LE edema  Gastrointestinal: Abdomen NT/ND, BS+  Musculoskeletal: muscle mass WNL  Skin: color pink hair full, incisions nicely healed  Neurological: No tremor, normal gait  Psychiatric: alert and oriented X3, mood and affect normal    Counseling     We reviewed the important post op bariatric recommendations:  -eating 3 meals daily  -eating protein first, getting >60gm protein daily  -eating slowly, chewing food well  -avoiding/limiting calorie containing beverages  -drinking water 15-30 minutes before or after meals  -choosing wheat, not white with breads, crackers, pastas, janey, bagels, tortillas, rice  -limiting restaurant or cafeteria eating to twice a week or less    We discussed the importance of restorative sleep and stress management in maintaining a healthy weight.  We discussed the National Weight Control Registry healthy weight maintenance strategies and ways to optimize metabolism.  We discussed the importance of physical activity including cardiovascular and strength training in maintaining a healthier weight.    We discussed the importance of life-long vitamin supplementation and life-long  follow-up.    Corby was reminded that, to avoid marginal ulcers she should avoid tobacco at all, alcohol in excess, caffeine in excess, and NSAIDS (unless indicated for cardioprotection or othewise and opposed by a PPI).    Tamica Giraldo MD, FAAFP  [facility].  2/11/2020  10:45 AM      No images are attached to the encounter.   30 minutes spent with patient. >50% in counseling and coordination of care.

## 2021-06-08 NOTE — PROGRESS NOTES
"Bariatric Follow Up Visit with a History of Previous Bariatric Surgery     Date of visit: 1/20/2017  Physician: Tamica Giraldo MD  Primary Care Provider:  Robert Foote DO  Corby Bradley   61 y.o.  female    Date of Surgery: 12/8/2010  Initial Weight: 280#  Initial BMI:   Today's Weight:   Wt Readings from Last 1 Encounters:   01/20/17 (!) 223 lb 8 oz (101.4 kg)     Body mass index is 38.07 kg/(m^2).      Assessment and Plan     Assessment: Corby is a 61 y.o. year old female who is 6 yrs s/p  Paul en Y Gastric Bypass with Dr. Diana Bradley feels as if she has achieved the goals she hoped to accomplish through bariatric surgery and weight loss.    Encounter Diagnoses   Name Primary?     Postoperative malabsorption Yes     Hyperphagia      Elevated hemoglobin A1c        Plan:    Return for Next scheduled follow up with the dietitian.    Bariatric Surgery Review     Interim History/LifeChanges: LBP, disc herniation for 2 months. [facility]    Patient Concerns: labs    Medication changes: ultram    Vitamin Intake:   B-12   SL   MVI  2/d complete   Vitamin D  5,000   Calcium   citrate     Other  biotin              LABS: \"Reviewed  Excellent. A1c 6.6  Nausea no  Vomiting no  Constipation no  Diarrhea no  Rashes no  Hair Loss no  Calf tenderness n  Breathing difficulty no  Reactive Hypoglycemia yes  Light Headedness rarely   Moods yes    12 point ROS as above and otherwise negative      Habits:  Alcohol: 0-1  Tobacco: none  Caffeine decaf mix/1/d  NSAIDS daily  Exercise Routine: walking 4-6 times a week  3 meals/day yes  Protein first yes  60grams/day  Water Separate from meals yes  Calorie Containing Beverages no  Restaurant eating/wk 0-1  Sleeping 7-8 hours  Stress LBP now  CPAP: NA  Contraception: PM    Social History     Social History     Social History     Marital status:      Spouse name: N/A     Number of children: N/A     Years of education: N/A     Occupational History     Not on " file.     Social History Main Topics     Smoking status: Former Smoker     Smokeless tobacco: Never Used      Comment: Quit in 1986     Alcohol use 0.0 oz/week     0 - 1 drink(s) per week     Drug use: No     Sexual activity: Yes     Partners: Male     Birth control/ protection: Other     Other Topics Concern     Not on file     Social History Narrative       Past Medical History     Past Medical History   Diagnosis Date     Back pain      Fibromyalgia, primary      History of anesthesia complications      difficult to wake up/bad headache once     History of Paul-en-Y gastric bypass 2010     Hyperlipemia      Hypertension      Iron deficiency      Knee pain      Migraine      Morbid obesity      Postoperative malabsorption      Sleep apnea      Urinary incontinence      Problem List     Patient Active Problem List   Diagnosis     Morbid obesity     Fibromyalgia, primary     Back pain     Knee pain     Migraine     Obesity (BMI 30-39.9)     Iron deficiency     History of Paul-en-Y gastric bypass     Postoperative malabsorption     Medications     Current Outpatient Prescriptions   Medication Sig Note     aspirin 81 MG EC tablet Take 81 mg by mouth daily.      atorvastatin (LIPITOR) 40 MG tablet Take 40 mg by mouth. 12/30/2015: Received from: [facility]     biotin 10,000 mcg cap Take 1 tablet by mouth once daily.      butalbital-aspirin-caffeine (FIORINAL) -40 mg Tab per tablet Take 1 tablet by mouth every 4 (four) hours as needed.      calcium citrate (CALCITRATE) 200 mg (950 mg) tablet Take 1 tablet by mouth 2 (two) times a day.      cyanocobalamin, vitamin B-12, (VITAMIN B-12) 1,000 mcg Subl Take 1 tablet every other day. 12/30/2015: Received from: [facility]     cyclobenzaprine (FLEXERIL) 10 MG tablet Take 10 mg by mouth. 12/30/2015: Received from: [facility]     gabapentin (NEURONTIN) 100 MG capsule TAKE 1 CAPSULE BY MOUTH TWICE DAILY IN THE MORNING AND AFTERNOON 1/20/2017:  "Received from: External Pharmacy     gabapentin (NEURONTIN) 300 MG capsule TAKE 1 CAPSULE AT BEDTIME. 1/20/2017: Received from: External Pharmacy     lisinopril (PRINIVIL,ZESTRIL) 20 MG tablet Take 20 mg by mouth. 12/30/2015: Received from: [facility]     metroNIDAZOLE (METROCREAM) 0.75 % cream Apply topically. 12/30/2015: Received from: [facility]     multivitamin with minerals (THERA-M) 9 mg iron-400 mcg Tab tablet Take 2 tablets by mouth daily.      omeprazole (PRILOSEC) 20 MG capsule Take 1 capsule by mouth daily. 1/20/2017: Received from: External Pharmacy Received Sig:      prochlorperazine (COMPAZINE) 5 MG tablet Take 5 mg by mouth. 12/30/2015: Received from: [facility]     traMADol (ULTRAM) 50 mg tablet Take 1-2 tablets every 6 hours as needed for fibromyalgia pain      liraglutide (VICTOZA) 0.6 mg/0.1 mL (18 mg/3 mL) PnIj injection Inject 0.3 mL (1.8 mg total) under the skin daily.      phentermine (ADIPEX-P) 37.5 mg tablet Take 1/2 to 1 tablet in the morning.      Surgical History     Past Surgical History  She has a past surgical history that includes Paul-en-y procedure (12/8/2010); Cholecystectomy; Appendectomy; Colonoscopy; LASIK; ORIF zygomatic fracture; Exploratory laparotomy; and Abdominal adhesion surgery.    Objective-Exam     Constitutional:  Visit Vitals     /59     Pulse (!) 103     Resp 18     Ht 5' 4.25\" (1.632 m)     Wt (!) 223 lb 8 oz (101.4 kg)     SpO2 100%     BMI 38.07 kg/m2     Height: 5' 4.25\" (1.632 m) (1/20/2017 10:44 AM)  Weight: 223 lb 8 oz (101.4 kg) (1/20/2017 10:44 AM)  BMI (Calculated): 38.1 (1/20/2017 10:44 AM)  SpO2: 100 % (1/20/2017 10:44 AM)  General:  Pleasant and in no acute distress   Eyes:  EOMI  ENT:  Airway 1+  Moist mucous membranes  Neck:  Supple, No LAD, No thyromegaly, No carotid bruits appreciated  Respiratory: Normal respiratory effort, no cough, wheezes or crackles  CV:  Regular rate and Rhythm,no murmurs, pulses 2+, no " calf tenderness, no LE edema  Gastrointestinal: Abdomen NT/ND, BS+  Musculoskeletal: muscle mass WNL  Skin: color fair hair full, incisions nicely healed  Neurological: No tremor, normal gait  Psychiatric: alert and oriented X3, mood and affect normal    Counseling     We reviewed the important post op bariatric recommendations:  -eating 3 meals daily  -eating protein first, getting >60gm protein daily  -eating slowly, chewing food well  -avoiding/limiting calorie containing beverages  -drinking water 15-30 minutes before or after meals  -choosing wheat, not white with breads, crackers, pastas, janey, bagels, tortillas, rice  -limiting restaurant or cafeteria eating to twice a week or less    We discussed the importance of restorative sleep and stress management in maintaining a healthy weight.  We discussed the National Weight Control Registry healthy weight maintenance strategies and ways to optimize metabolism.  We discussed the importance of physical activity including cardiovascular and strength training in maintaining a healthier weight.    We discussed the importance of life-long vitamin supplementation and life-long  follow-up.    Corby was reminded that, to avoid marginal ulcers she should avoid tobacco at all, alcohol in excess, caffeine in excess, and NSAIDS (unless indicated for cardioprotection or othewise and opposed by a PPI).    Tamica Giraldo MD, [facility].  1/20/2017  11:09 AM     30 minutes spent with patient. >50% in counseling and coordination of care.

## 2021-06-14 NOTE — PROGRESS NOTES
7 year post-op RNY  lab orders placed for patient and will be sent to the patient's home and faxed to the patient's clinic  in preparation for appointment with Tamica Giraldo MD on 1/4/2018.    Sofía Cespedes RN, N  [facility]

## 2021-06-15 NOTE — PROGRESS NOTES
10 yrs post op lab orders placed for patient and sent to [facility], fax number [phone] in preparation for appointment with  in April.    Heather Powers RN, CBN  [facility]  P [phone]  F [phone]

## 2021-06-15 NOTE — PROGRESS NOTES
Here for 7 yrs s/p RNY f/u visit.  See flowsheet.  La bs done and are in Care Everywhere.    Heather Powers RN, CBN  [facility]  P [phone]  F [phone]

## 2021-06-15 NOTE — PROGRESS NOTES
Bariatric Follow Up Visit with a History of Previous Bariatric Surgery     Date of visit: 1/4/2018  Physician: Tamica Giraldo MD  Primary Care Provider:  Robert Foote DO  Corby Bradley   62 y.o.  female    Date of Surgery: 12/8/2010  Initial Weight: 280#  Initial BMI:   Today's Weight:   Wt Readings from Last 1 Encounters:   01/04/18 217 lb (98.4 kg)     Body mass index is 36.96 kg/(m^2).      Assessment and Plan     Assessment: Corby is a 62 y.o. year old female who is 7 yrs s/p  Paul en Y Gastric Bypass with Dr. Diana Bradley feels as if she has achieved the goals she hoped to accomplish through bariatric surgery and weight loss.  Her A1c has gone up to 6.9 over the Holidays. Recently put back on metformin.     Encounter Diagnoses   Name Primary?     Postoperative malabsorption Yes     Obesity (BMI 30-39.9)          Current Outpatient Prescriptions:      aspirin 81 MG EC tablet, Take 81 mg by mouth daily., Disp: , Rfl:      atorvastatin (LIPITOR) 40 MG tablet, Take 40 mg by mouth., Disp: , Rfl:      biotin 5 mg Tab, Take 500 mcg by mouth every other day., Disp: , Rfl:      butalbital-aspirin-caffeine (FIORINAL) -40 mg Tab per tablet, Take 1 tablet by mouth every 4 (four) hours as needed., Disp: , Rfl:      calcium carbonate-vitamin D3 600 mg(1,500mg) -400 unit Chew, Chew 1 tablet 2 (two) times a day., Disp: , Rfl:      cyanocobalamin, vitamin B-12, (VITAMIN B-12) 1,000 mcg Subl, Take 1 tablet every other day., Disp: , Rfl:      cyclobenzaprine (FLEXERIL) 10 MG tablet, Take 10 mg by mouth at bedtime. , Disp: , Rfl:      ESTRACE 0.01 % (0.1 mg/gram) vaginal cream, Insert 1 application into the vagina 2 (two) times a week., Disp: , Rfl: 1     lisinopril (PRINIVIL,ZESTRIL) 20 MG tablet, Take 20 mg by mouth., Disp: , Rfl:      metFORMIN (GLUCOPHAGE) 500 MG tablet, Take 1 tablet by mouth 2 (two) times a day., Disp: , Rfl: 0     metroNIDAZOLE (METROCREAM) 0.75 % cream, 1 application daily. ,  "Disp: , Rfl:      multivitamin Chew, Chew 1 tablet 2 (two) times a day., Disp: , Rfl:      omeprazole (PRILOSEC) 20 MG capsule, Take 1 capsule (20 mg total) by mouth daily., Disp: 90 capsule, Rfl: 3     polyethylene glycol (GLYCOLAX) 17 gram/dose powder, Take 17 g by mouth daily., Disp: , Rfl:      prochlorperazine (COMPAZINE) 5 MG tablet, Take 5 mg by mouth., Disp: , Rfl:      traMADol (ULTRAM) 50 mg tablet, Take 1-2 tablets every 6 hours as needed for fibromyalgia pain, Disp: 90 tablet, Rfl: prn    Plan: strength training with consistency. Walk daily. Continue healthy habits.     Return in about 1 year (around 1/4/2019).    Bariatric Surgery Review     Interim History/LifeChanges: Doing well. A1c 6.9 (up from 6.4) Father diagnosed with alzheimers recently age 85    Patient Concerns: blood sugars.     Medication changes: metformin now, 500mg BID    Vitamin Intake:   B-12   SL   MVI  2/d   Vitamin D  5,000   Calcium   citrate 2/d     Other  supplement for migraine              LABS: \"Reviewed  Excellent. A1c 6.9  Nausea no  Vomiting no  Constipation no  Diarrhea no  Rashes no  Hair Loss no  Calf tenderness no  Breathing difficulty no  Reactive Hypoglycemia yes  Light Headedness no   Moods OK    12 point ROS as above and otherwise negative      Habits:  Alcohol: rare 1/2 glass  Tobacco: no  Caffeine 1/d half caf  NSAIDS ASA  Exercise Routine: walking 4 days a week 1/2 hour to 45 min. Has hand weights  And stretching  3 meals/day yes  Protein first yes  60+ grams/day  Water Separate from meals yes  Calorie Containing Beverages no  Restaurant eating/wk <2  Sleeping OK 7-8 hours  Stress low  CPAP: NA  Contraception: PM    Social History     Social History     Social History     Marital status:      Spouse name: N/A     Number of children: N/A     Years of education: N/A     Occupational History     Not on file.     Social History Main Topics     Smoking status: Former Smoker     Smokeless tobacco: Never Used      " Comment: Quit in 1986     Alcohol use 0.0 oz/week     0 - 1 Standard drinks or equivalent per week      Comment: Rarely     Drug use: No     Sexual activity: Yes     Partners: Male     Birth control/ protection: Other     Other Topics Concern     Not on file     Social History Narrative       Past Medical History     Past Medical History:   Diagnosis Date     Back pain      Fibromyalgia, primary      History of anesthesia complications     difficult to wake up/bad headache once     History of Paul-en-Y gastric bypass 2010     Hyperlipemia      Hypertension      Iron deficiency      Knee pain      Migraine      Morbid obesity      Postoperative malabsorption      Sleep apnea      Urinary incontinence      Problem List     Patient Active Problem List   Diagnosis     Morbid obesity     Fibromyalgia, primary     Back pain     Knee pain     Migraine     Obesity (BMI 30-39.9)     Iron deficiency     History of Paul-en-Y gastric bypass     Postoperative malabsorption     Medications     Current Outpatient Prescriptions   Medication Sig Note     aspirin 81 MG EC tablet Take 81 mg by mouth daily.      atorvastatin (LIPITOR) 40 MG tablet Take 40 mg by mouth. 12/30/2015: Received from: [facility]     biotin 5 mg Tab Take 500 mcg by mouth every other day. 1/4/2018: Received from: [facility] Received Sig: Take 1 capsule by mouth once daily.     butalbital-aspirin-caffeine (FIORINAL) -40 mg Tab per tablet Take 1 tablet by mouth every 4 (four) hours as needed.      calcium carbonate-vitamin D3 600 mg(1,500mg) -400 unit Chew Chew 1 tablet 2 (two) times a day. 1/4/2018: Received from: [facility] Received Sig: Take 2 tablets by mouth.     cyanocobalamin, vitamin B-12, (VITAMIN B-12) 1,000 mcg Subl Take 1 tablet every other day. 12/30/2015: Received from: [facility]     cyclobenzaprine (FLEXERIL) 10 MG tablet Take 10 mg by mouth at bedtime.  " 12/30/2015: Received from: [facility]     ESTRACE 0.01 % (0.1 mg/gram) vaginal cream Insert 1 application into the vagina 2 (two) times a week. 1/4/2018: Received from: External Pharmacy Received Sig: APPLY SMALL AMT AROUND VAGINAL OPENING 1-3X/WEEK AS NEEDED.     lisinopril (PRINIVIL,ZESTRIL) 20 MG tablet Take 20 mg by mouth. 12/30/2015: Received from: [facility]     metFORMIN (GLUCOPHAGE) 500 MG tablet Take 1 tablet by mouth 2 (two) times a day. 1/4/2018: Received from: External Pharmacy Received Sig: TAKE 1 TABLET BY MOUTH 2 TIMES DAILY WITH MEALS.     metroNIDAZOLE (METROCREAM) 0.75 % cream 1 application daily.  12/30/2015: Received from: [facility]     multivitamin Chew Chew 1 tablet 2 (two) times a day. 1/4/2018: Received from: [facility] Received Sig: Take 2 tablets by mouth once daily.     omeprazole (PRILOSEC) 20 MG capsule Take 1 capsule (20 mg total) by mouth daily.      polyethylene glycol (GLYCOLAX) 17 gram/dose powder Take 17 g by mouth daily. 1/4/2018: Received from: External Pharmacy     prochlorperazine (COMPAZINE) 5 MG tablet Take 5 mg by mouth. 12/30/2015: Received from: [facility]     traMADol (ULTRAM) 50 mg tablet Take 1-2 tablets every 6 hours as needed for fibromyalgia pain      Surgical History     Past Surgical History  She has a past surgical history that includes Cholecystectomy; Appendectomy; Colonoscopy; LASIK; ORIF zygomatic fracture; Exploratory laparotomy; Abdominal adhesion surgery; Blepharoplasty (07/2017); and Paul-en-y procedure (12/8/2010).    Objective-Exam     Constitutional:  /75  Pulse 99  Resp 16  Ht 5' 4.25\" (1.632 m)  Wt 217 lb (98.4 kg)  BMI 36.96 kg/m2  Height: 5' 4.25\" (1.632 m) (1/4/2018  1:38 PM)  Initial Weight: 280 lbs (1/4/2018  1:38 PM)  Weight: 217 lb (98.4 kg) (1/4/2018  1:38 PM)  Weight loss from initial: 63 (1/4/2018  1:38 PM)  % Weight loss: 22.5 % (1/4/2018  1:38 " PM)  BMI (Calculated): 37 (1/4/2018  1:38 PM)  SpO2: 100 % (1/20/2017 10:44 AM)  NSAIDS: Yes (1/4/2018  1:38 PM)  Pain Scale: 0 (1/4/2018  1:38 PM)  General:  Pleasant and in no acute distress   Eyes:  EOMI  ENT:  Airway 1+  Moist mucous membranes  Neck:  Supple, No LAD, No thyromegaly, No carotid bruits appreciated  Respiratory: Normal respiratory effort, no cough, wheezes or crackles  CV:  Regular rate and Rhythm, 1/6 murmurs, pulses 2+, no calf tenderness, no LE edema  Gastrointestinal: Abdomen NT/ND, BS+  Musculoskeletal: muscle mass WNL  Skin: color pink hair full, incisions nicely healed  Neurological: No tremor, normal gait  Psychiatric: alert and oriented X3, mood and affect normal    Counseling     We reviewed the important post op bariatric recommendations:  -eating 3 meals daily  -eating protein first, getting >60gm protein daily  -eating slowly, chewing food well  -avoiding/limiting calorie containing beverages  -drinking water 15-30 minutes before or after meals  -choosing wheat, not white with breads, crackers, pastas, janey, bagels, tortillas, rice  -limiting restaurant or cafeteria eating to twice a week or less    We discussed the importance of restorative sleep and stress management in maintaining a healthy weight.  We discussed the National Weight Control Registry healthy weight maintenance strategies and ways to optimize metabolism.  We discussed the importance of physical activity including cardiovascular and strength training in maintaining a healthier weight.    We discussed the importance of life-long vitamin supplementation and life-long  follow-up.    Corby was reminded that, to avoid marginal ulcers she should avoid tobacco at all, alcohol in excess, caffeine in excess, and NSAIDS (unless indicated for cardioprotection or othewise and opposed by a PPI).    Tamica Giraldo MD, FAAFP  [facility].  1/4/2018  2:17 PM     30 minutes spent with patient. >50% in  counseling and coordination of care.

## 2021-06-16 PROBLEM — M51.9 LUMBAR DISC DISEASE: Status: ACTIVE | Noted: 2017-01-11

## 2021-06-16 PROBLEM — E11.9 CONTROLLED TYPE 2 DIABETES MELLITUS WITHOUT COMPLICATION, WITHOUT LONG-TERM CURRENT USE OF INSULIN (H): Status: ACTIVE | Noted: 2018-01-01

## 2021-06-16 NOTE — TELEPHONE ENCOUNTER
----- Message from Tamica Giraldo MD sent at 3/23/2021  8:18 AM CDT -----  Regarding: Lab results  Please let Corby know that her labs are excellent. We can talk about them in more detail in April.

## 2021-06-16 NOTE — PROGRESS NOTES
"Corby Bradley is a 65 y.o. female who is being evaluated via a billable telephone visit.      The patient has been notified of following:     \"This telephone visit will be conducted via a call between you and your physician/provider. We have found that certain health care needs can be provided without the need for a physical exam.  This service lets us provide the care you need with a short phone conversation.  If a prescription is necessary we can send it directly to your pharmacy.  If lab work is needed we can place an order for that and you can then stop by our lab to have the test done at a later time.    Telephone visits are billed at different rates depending on your insurance coverage. During this emergency period, for some insurers they may be billed the same as an in-person visit.  Please reach out to your insurance provider with any questions.    If during the course of the call the physician/provider feels a telephone visit is not appropriate, you will not be charged for this service.\"    Patient has given verbal consent to a Telephone visit? Yes    What phone number would you like to be contacted at? [phone]    Patient would like to receive their AVS by AVS Preference: Olivia.    Corby Bradley is a 65 y.o. female who is being evaluated via a billable telephone visit.      The patient has been notified of following:     \"This telephone visit will be conducted via a call between you and your physician/provider. We have found that certain health care needs can be provided without the need for a physical exam.  This service lets us provide the care you need with a short phone conversation.  If a prescription is necessary we can send it directly to your pharmacy.  If lab work is needed we can place an order for that and you can then stop by our lab to have the test done at a later time.    If during the course of the call the physician/provider feels a telephone visit is not appropriate, you will " "not be charged for this service.\"     Corby Bradley complains of  postoperative malabsorption.    I have reviewed and updated the patient's Past Medical History, Social History, Family History and Medication List.    ALLERGIES  Propoxyphene-acetaminophen, Sulfa (sulfonamide antibiotics), Adhesive, and Pseudoephedrine      Assessment/Plan:    1. Postoperative malabsorption  DXA Bone Density Scan   2. Post-menopausal  DXA Bone Density Scan       I have reviewed the note as documented above.  This accurately captures the substance of my conversation with the patient.    Phone call contact time    Call Started at: 10:40  Call Ended at: 11:05      Signature:  Tamica Giraldo MD, [facility]      Bariatric Follow Up Visit with a History of Previous Bariatric Surgery     Date of visit: 4/6/2021  Physician: Tamica Giraldo MD  Primary Care Provider:  Ely Hussein MD  Corby Bradley   65 y.o.  female    Date of Surgery: 12/8/2010  Initial Weight: 280#  Initial BMI:   Today's Weight:   Wt Readings from Last 1 Encounters:   04/06/21 200 lb (90.7 kg)     Body mass index is 34.06 kg/m .      Assessment and Plan     Assessment: Corby is a 65 y.o. year old female who is 10 yrs s/p  Paul en Y Gastric Bypass with Dr. Diana Tavarez ANKIT Amybraydon feels as if she has achieved the goals she hoped to accomplish through bariatric surgery and weight loss.    Encounter Diagnoses   Name Primary?     Postoperative malabsorption Yes     Post-menopausal          Current Outpatient Medications:      aspirin 81 MG EC tablet, Take 81 mg by mouth daily., Disp: , Rfl:      atorvastatin (LIPITOR) 40 MG tablet, Take 40 mg by mouth., Disp: , Rfl:      biotin 5 mg Tab, Take 500 mcg by mouth every other day., Disp: , Rfl:      butalbital-aspirin-caffeine (FIORINAL) -40 mg Tab per tablet, Take 1 tablet by mouth every 4 (four) hours as needed., Disp: , Rfl:      calcium carbonate-vitamin D3 600 mg(1,500mg) -400 unit Chew, Chew 1 tablet 2 " (two) times a day., Disp: , Rfl:      calcium citrate-vitamin D3 500 mg calcium -400 unit Chew, Chew 1 tablet daily., Disp: , Rfl:      carboxymethylcellulose (REFRESH PLUS) 0.5 % Dpet ophthalmic dropperette, Administer 1 drop to both eyes as needed., Disp: , Rfl:      cholecalciferol, vitamin D3, 5,000 unit Tab, Take 5,000 Units by mouth., Disp: , Rfl:      cyanocobalamin, vitamin B-12, (VITAMIN B-12) 1,000 mcg Subl, Take 1 tablet every other day., Disp: , Rfl:      cyclobenzaprine (FLEXERIL) 10 MG tablet, Take 10 mg by mouth at bedtime. , Disp: , Rfl:      ESTRACE 0.01 % (0.1 mg/gram) vaginal cream, Insert 1 application into the vagina 2 (two) times a week., Disp: , Rfl: 1     estradioL (ESTRACE) 0.01 % (0.1 mg/gram) vaginal cream, , Disp: , Rfl:      lisinopril (PRINIVIL,ZESTRIL) 20 MG tablet, Take 20 mg by mouth., Disp: , Rfl:      metFORMIN (GLUCOPHAGE) 500 MG tablet, Take 1 tablet by mouth 2 (two) times a day., Disp: , Rfl: 0     metroNIDAZOLE (METROCREAM) 0.75 % cream, 1 application daily. , Disp: , Rfl:      multivitamin Chew, Chew 1 tablet 2 (two) times a day., Disp: , Rfl:      omeprazole (PRILOSEC) 20 MG capsule, Take 1 capsule (20 mg total) by mouth daily., Disp: 90 capsule, Rfl: 3     polyethylene glycol (GLYCOLAX) 17 gram/dose powder, Take 17 g by mouth daily., Disp: , Rfl:      prochlorperazine (COMPAZINE) 5 MG tablet, Take 5 mg by mouth., Disp: , Rfl:      traMADol (ULTRAM) 50 mg tablet, Take 1-2 tablets every 6 hours as needed for fibromyalgia pain, Disp: 90 tablet, Rfl: prn     traZODone (DESYREL) 50 MG tablet, Take 50 mg by mouth at bedtime., Disp: , Rfl:      traZODone (DESYREL) 50 MG tablet, , Disp: , Rfl:     Plan: DEXA. Reviewed labs. Excellent. F/U annually.     Return in about 1 year (around 4/6/2022).    Bariatric Surgery Review     Interim History/LifeChanges: Doing well. Maintaining a 80# weight loss. Taking her vitamins. Feeling well. Labs look good. A1c 6.4. She got a treadmill and has  "been doing that.     Patient Concerns: no  Appetite (1-10):   GERD: no    Medication changes: no    Vitamin Intake:   B-12   SL   MVI  2/d   Vitamin D  5,000   Calcium   citrate     Other                LABS: \"Reviewed  excellent  Nausea no  Vomiting no  Constipation no uses miralax daily  Diarrhea no  Rashes no  Hair Loss no  Calf tenderness no  Breathing difficulty no  Reactive Hypoglycemia yes  Light Headedness once in a while   Moods stable    12 point ROS as above and otherwise negative    Habits:  Alcohol: 0-1 Tobacco: no  Caffeine decaf/caf  NSAIDS ASA-omeprazole  Exercise Routine: new treadmill-using 6 days a week 2-3 miles  3 meals/day 3  Protein first yes  60+grams/day  Water Separate from meals yes  Calorie Containing Beverages no  Restaurant eating/wk 0-1  Sleeping well, 7-8 hours  Stress moderate- new DM2  CPAP: Using again  Contraception: PM  DEXA:ordered first    Social History     Social History     Socioeconomic History     Marital status:      Spouse name: Not on file     Number of children: Not on file     Years of education: Not on file     Highest education level: Not on file   Occupational History     Not on file   Social Needs     Financial resource strain: Not on file     Food insecurity     Worry: Not on file     Inability: Not on file     Transportation needs     Medical: Not on file     Non-medical: Not on file   Tobacco Use     Smoking status: Former Smoker     Smokeless tobacco: Never Used     Tobacco comment: Quit in 1986   Substance and Sexual Activity     Alcohol use: Yes     Alcohol/week: 0.0 standard drinks     Comment: Rarely     Drug use: No     Sexual activity: Yes     Partners: Male     Birth control/protection: Other   Lifestyle     Physical activity     Days per week: Not on file     Minutes per session: Not on file     Stress: Not on file   Relationships     Social connections     Talks on phone: Not on file     Gets together: Not on file     Attends Jain " service: Not on file     Active member of club or organization: Not on file     Attends meetings of clubs or organizations: Not on file     Relationship status: Not on file     Intimate partner violence     Fear of current or ex partner: Not on file     Emotionally abused: Not on file     Physically abused: Not on file     Forced sexual activity: Not on file   Other Topics Concern     Not on file   Social History Narrative    .        Past Medical History     Past Medical History:   Diagnosis Date     Back pain      Controlled type 2 diabetes mellitus without complication, without long-term current use of insulin (H) 1/1/2018     Fibromyalgia, primary      History of anesthesia complications     difficult to wake up/bad headache once     History of Paul-en-Y gastric bypass 2010     Hyperlipemia      Hypertension      Iron deficiency      Knee pain      Migraine      Morbid obesity (H)      Postoperative malabsorption      Sleep apnea      Urinary incontinence      Problem List     Patient Active Problem List   Diagnosis     Fibromyalgia, primary     Back pain     Knee pain     Migraine     Obesity (BMI 30-39.9)     History of Paul-en-Y gastric bypass     Postoperative malabsorption     Controlled type 2 diabetes mellitus without complication, without long-term current use of insulin (H)     Hypertension     Hypertriglyceridemia     Irritable bowel syndrome     Lactose intolerance     Lumbar disc disease     Pure hypercholesterolemia     Rosacea     Sleep apnea     Status post gastric bypass for obesity     Medications     Current Outpatient Medications   Medication Sig Note     aspirin 81 MG EC tablet Take 81 mg by mouth daily.      atorvastatin (LIPITOR) 40 MG tablet Take 40 mg by mouth. 12/30/2015: Received from: [facility]     biotin 5 mg Tab Take 500 mcg by mouth every other day. 1/4/2018: Received from: [facility] Received Sig: Take 1 capsule by mouth once  daily.     butalbital-aspirin-caffeine (FIORINAL) -40 mg Tab per tablet Take 1 tablet by mouth every 4 (four) hours as needed.      calcium carbonate-vitamin D3 600 mg(1,500mg) -400 unit Chew Chew 1 tablet 2 (two) times a day. 1/4/2018: Received from: [facility] Received Sig: Take 2 tablets by mouth.     calcium citrate-vitamin D3 500 mg calcium -400 unit Chew Chew 1 tablet daily.      carboxymethylcellulose (REFRESH PLUS) 0.5 % Dpet ophthalmic dropperette Administer 1 drop to both eyes as needed.      cholecalciferol, vitamin D3, 5,000 unit Tab Take 5,000 Units by mouth.      cyanocobalamin, vitamin B-12, (VITAMIN B-12) 1,000 mcg Subl Take 1 tablet every other day. 12/30/2015: Received from: [facility]     cyclobenzaprine (FLEXERIL) 10 MG tablet Take 10 mg by mouth at bedtime.  12/30/2015: Received from: [facility]     ESTRACE 0.01 % (0.1 mg/gram) vaginal cream Insert 1 application into the vagina 2 (two) times a week. 1/4/2018: Received from: External Pharmacy Received Sig: APPLY SMALL AMT AROUND VAGINAL OPENING 1-3X/WEEK AS NEEDED.     estradioL (ESTRACE) 0.01 % (0.1 mg/gram) vaginal cream       lisinopril (PRINIVIL,ZESTRIL) 20 MG tablet Take 20 mg by mouth. 12/30/2015: Received from: [facility]     metFORMIN (GLUCOPHAGE) 500 MG tablet Take 1 tablet by mouth 2 (two) times a day. 1/4/2018: Received from: External Pharmacy Received Sig: TAKE 1 TABLET BY MOUTH 2 TIMES DAILY WITH MEALS.     metroNIDAZOLE (METROCREAM) 0.75 % cream 1 application daily.  12/30/2015: Received from: [facility]     multivitamin Chew Chew 1 tablet 2 (two) times a day. 1/4/2018: Received from: [facility] Received Sig: Take 2 tablets by mouth once daily.     omeprazole (PRILOSEC) 20 MG capsule Take 1 capsule (20 mg total) by mouth daily.      polyethylene glycol (GLYCOLAX) 17 gram/dose powder Take 17 g by mouth daily.  "1/4/2018: Received from: External Pharmacy     prochlorperazine (COMPAZINE) 5 MG tablet Take 5 mg by mouth. 12/30/2015: Received from: [facility]     traMADol (ULTRAM) 50 mg tablet Take 1-2 tablets every 6 hours as needed for fibromyalgia pain      traZODone (DESYREL) 50 MG tablet Take 50 mg by mouth at bedtime.      traZODone (DESYREL) 50 MG tablet       Surgical History     Past Surgical History  She has a past surgical history that includes Cholecystectomy; Appendectomy; Colonoscopy; LASIK; ORIF zygomatic fracture; Exploratory laparotomy; Abdominal adhesion surgery; Blepharoplasty (07/2017); and Paul-en-y procedure (12/8/2010).    Objective-Exam     Constitutional:  Ht 5' 4.25\" (1.632 m)   Wt 200 lb (90.7 kg)   BMI 34.06 kg/m    Height: 5' 4.25\" (1.632 m) (4/6/2021 10:00 AM)  Initial Weight: 280 lbs (4/6/2021 10:00 AM)  Weight: 200 lb (90.7 kg) (4/6/2021 10:00 AM)  Weight loss from initial: 80 (4/6/2021 10:00 AM)  % Weight loss: 28.57 % (4/6/2021 10:00 AM)  BMI (Calculated): 34.1 (4/6/2021 10:00 AM)    General:  Pleasant and in no acute distress     Psychiatric: alert and oriented X3, mood and affect normal    Counseling     We reviewed the important post op bariatric recommendations:  -eating 3 meals daily  -eating protein first, getting >60gm protein daily  -eating slowly, chewing food well  -avoiding/limiting calorie containing beverages  -drinking water 15-30 minutes before or after meals  -choosing wheat, not white with breads, crackers, pastas, janey, bagels, tortillas, rice  -limiting restaurant or cafeteria eating to twice a week or less    We discussed the importance of restorative sleep and stress management in maintaining a healthy weight.  We discussed the National Weight Control Registry healthy weight maintenance strategies and ways to optimize metabolism.  We discussed the importance of physical activity including cardiovascular and strength training in maintaining a healthier " weight.    We discussed the importance of life-long vitamin supplementation and life-long  follow-up.    Corby was reminded that, to avoid marginal ulcers she should avoid tobacco at all, alcohol in excess, caffeine in excess, and NSAIDS (unless indicated for cardioprotection or othewise and opposed by a PPI).    Tamica Giraldo MD, [facility].  4/6/2021  10:42 AM  Total time spent on the date of this encounter doing: chart review, review of test results, patient visit, physical exam, education, counseling, developing plan of care, and documenting = 25   minutes.    Tamica Giraldo MD

## 2021-06-17 NOTE — PATIENT INSTRUCTIONS - HE
Patient Instructions by Tamica Douglas MD at 1/8/2019  3:00 PM     Author: Tamica Douglas MD Service: -- Author Type: Physician    Filed: 1/8/2019  3:38 PM Encounter Date: 1/8/2019 Status: Signed    : Tamica Douglas MD (Physician)

## 2021-06-19 ENCOUNTER — HEALTH MAINTENANCE LETTER (OUTPATIENT)
Age: 66
End: 2021-06-19

## 2021-06-21 NOTE — PROGRESS NOTES
8 year post-op [facility]  lab orders placed for patient and will be sent to the patient's home and faxed to the patient's clinic  in preparation for appointment with Tamica Giraldo MD on 1/11/2019.    Sofía Cespedes RN, N  [facility]

## 2021-06-22 NOTE — PROGRESS NOTES
Bariatric Follow Up Visit with a History of Previous Bariatric Surgery     Date of visit: 1/8/2019  Physician: Tamica Giraldo MD  Primary Care Provider:  Robert Foote DO  Corby ANKIT Bradley   63 y.o.  female    Date of Surgery: 12/8/2010  Initial Weight: 280#  Initial BMI:   Today's Weight:   Wt Readings from Last 1 Encounters:   01/08/19 201 lb (91.2 kg)     Body mass index is 34.23 kg/m .      Assessment and Plan     Assessment: Corby is a 63 y.o. year old female who is 8 yrs s/p  Paul en Y Gastric Bypass with Dr. Diana Bradley feels as if she has achieved the goals she hoped to accomplish through bariatric surgery and weight loss.    Encounter Diagnosis   Name Primary?     Postoperative malabsorption Yes         Current Outpatient Medications:      aspirin 81 MG EC tablet, Take 81 mg by mouth daily., Disp: , Rfl:      atorvastatin (LIPITOR) 40 MG tablet, Take 40 mg by mouth., Disp: , Rfl:      biotin 5 mg Tab, Take 500 mcg by mouth every other day., Disp: , Rfl:      butalbital-aspirin-caffeine (FIORINAL) -40 mg Tab per tablet, Take 1 tablet by mouth every 4 (four) hours as needed., Disp: , Rfl:      calcium carbonate-vitamin D3 600 mg(1,500mg) -400 unit Chew, Chew 1 tablet 2 (two) times a day., Disp: , Rfl:      calcium citrate-vitamin D3 500 mg calcium -400 unit Chew, Chew 1 tablet daily., Disp: , Rfl:      carboxymethylcellulose (REFRESH PLUS) 0.5 % Dpet ophthalmic dropperette, Administer 1 drop to both eyes as needed., Disp: , Rfl:      cyanocobalamin, vitamin B-12, (VITAMIN B-12) 1,000 mcg Subl, Take 1 tablet every other day., Disp: , Rfl:      cyclobenzaprine (FLEXERIL) 10 MG tablet, Take 10 mg by mouth at bedtime. , Disp: , Rfl:      ESTRACE 0.01 % (0.1 mg/gram) vaginal cream, Insert 1 application into the vagina 2 (two) times a week., Disp: , Rfl: 1     lisinopril (PRINIVIL,ZESTRIL) 20 MG tablet, Take 20 mg by mouth., Disp: , Rfl:      metFORMIN (GLUCOPHAGE) 500 MG tablet, Take 1  "tablet by mouth 2 (two) times a day., Disp: , Rfl: 0     metroNIDAZOLE (METROCREAM) 0.75 % cream, 1 application daily. , Disp: , Rfl:      multivitamin Chew, Chew 1 tablet 2 (two) times a day., Disp: , Rfl:      omeprazole (PRILOSEC) 20 MG capsule, Take 1 capsule (20 mg total) by mouth daily., Disp: 90 capsule, Rfl: 3     polyethylene glycol (GLYCOLAX) 17 gram/dose powder, Take 17 g by mouth daily., Disp: , Rfl:      prochlorperazine (COMPAZINE) 5 MG tablet, Take 5 mg by mouth., Disp: , Rfl:      traMADol (ULTRAM) 50 mg tablet, Take 1-2 tablets every 6 hours as needed for fibromyalgia pain, Disp: 90 tablet, Rfl: prn    Plan: Reminded to stay active, continue to monitor steps. Strength training, step ups, dumbbells (shoulder exercises per PT) and toe raises. Continue vitamin regimen without change.  Dietitian f/u prn. For Tramadol RF should be seen if not seen within 6 months. Prefer tramadol over NSAIDS to avoid marginal ulcer. Explained that we like to avoid NSAIDS while avoiding narcotics.    Return in about 1 year (around 1/8/2020). 6 months if needing tramadol RF.    Bariatric Surgery Review     Interim History/LifeChanges: Doing well. Is taking metformin for about a year. A1c 6.1. Had PT for shoulder pain. Further tramadol refills by me.     Patient Concerns: no  Appetite (1-10): varies maybe 4-5  GERD: no. On PPI for ASA    Medication changes: last year re-started metformin    Vitamin Intake:   B-12   SL   MVI  2/d FlinsneoSaejfrederic's   Vitamin D  5,000   Calcium   citrate     Other  biotin              LABS: \"Reviewed  excellent  Nausea no  Vomiting no  Constipation no  Diarrhea no  Rashes no  Hair Loss no  Calf tenderness no  Breathing difficulty no  Reactive Hypoglycemia sluggish and hot  Light Headedness no   Moods OK    12 point ROS as above and otherwise negative      Habits:  Alcohol: 1/2 each month  Tobacco: no  Caffeine coffee 1/2 decaf  NSAIDS ASA  Exercise Routine: 8,000 per pedometer. Walks with " girlfriend. Walks the dog park  3 meals/day yes  Protein first yes  50-60 grams/day  Water Separate from meals yes  Calorie Containing Beverages no  Restaurant eating/wk 0-1  Sleeping 8 hours  Stress low  CPAP: no  Contraception: PM    Social History     Social History     Socioeconomic History     Marital status:      Spouse name: Not on file     Number of children: Not on file     Years of education: Not on file     Highest education level: Not on file   Social Needs     Financial resource strain: Not on file     Food insecurity - worry: Not on file     Food insecurity - inability: Not on file     Transportation needs - medical: Not on file     Transportation needs - non-medical: Not on file   Occupational History     Not on file   Tobacco Use     Smoking status: Former Smoker     Smokeless tobacco: Never Used     Tobacco comment: Quit in 1986   Substance and Sexual Activity     Alcohol use: Yes     Alcohol/week: 0.0 oz     Comment: Rarely     Drug use: No     Sexual activity: Yes     Partners: Male     Birth control/protection: Other   Other Topics Concern     Not on file   Social History Narrative     Not on file       Past Medical History     Past Medical History:   Diagnosis Date     Back pain      Fibromyalgia, primary      History of anesthesia complications     difficult to wake up/bad headache once     History of Paul-en-Y gastric bypass 2010     Hyperlipemia      Hypertension      Iron deficiency      Knee pain      Migraine      Morbid obesity (H)      Postoperative malabsorption      Sleep apnea      Urinary incontinence      Problem List     Patient Active Problem List   Diagnosis     Fibromyalgia, primary     Back pain     Knee pain     Migraine     Obesity (BMI 30-39.9)     History of Paul-en-Y gastric bypass     Postoperative malabsorption     Medications     Current Outpatient Medications   Medication Sig Note     aspirin 81 MG EC tablet Take 81 mg by mouth daily.      atorvastatin (LIPITOR)  40 MG tablet Take 40 mg by mouth. 12/30/2015: Received from: [email or web address]     biotin 5 mg Tab Take 500 mcg by mouth every other day. 1/4/2018: Received from: [email or web address] Received Sig: Take 1 capsule by mouth once daily.     butalbital-aspirin-caffeine (FIORINAL) -40 mg Tab per tablet Take 1 tablet by mouth every 4 (four) hours as needed.      calcium carbonate-vitamin D3 600 mg(1,500mg) -400 unit Chew Chew 1 tablet 2 (two) times a day. 1/4/2018: Received from: [email or web address] Received Sig: Take 2 tablets by mouth.     calcium citrate-vitamin D3 500 mg calcium -400 unit Chew Chew 1 tablet daily.      carboxymethylcellulose (REFRESH PLUS) 0.5 % Dpet ophthalmic dropperette Administer 1 drop to both eyes as needed.      cyanocobalamin, vitamin B-12, (VITAMIN B-12) 1,000 mcg Subl Take 1 tablet every other day. 12/30/2015: Received from: [email or web address]     cyclobenzaprine (FLEXERIL) 10 MG tablet Take 10 mg by mouth at bedtime.  12/30/2015: Received from: [email or web address]     ESTRACE 0.01 % (0.1 mg/gram) vaginal cream Insert 1 application into the vagina 2 (two) times a week. 1/4/2018: Received from: External Pharmacy Received Sig: APPLY SMALL AMT AROUND VAGINAL OPENING 1-3X/WEEK AS NEEDED.     lisinopril (PRINIVIL,ZESTRIL) 20 MG tablet Take 20 mg by mouth. 12/30/2015: Received from: [email or web address]     metFORMIN (GLUCOPHAGE) 500 MG tablet Take 1 tablet by mouth 2 (two) times a day. 1/4/2018: Received from: External Pharmacy Received Sig: TAKE 1 TABLET BY MOUTH 2 TIMES DAILY WITH MEALS.     metroNIDAZOLE (METROCREAM) 0.75 % cream 1 application daily.  12/30/2015: Received from: [email or web address]     multivitamin Chew Chew 1 tablet 2 (two) times a day. 1/4/2018: Received from: [email or web address] Received Sig: Take 2 tablets by mouth once daily.     omeprazole (PRILOSEC) 20 MG capsule Take 1 capsule  "(20 mg total) by mouth daily.      polyethylene glycol (GLYCOLAX) 17 gram/dose powder Take 17 g by mouth daily. 1/4/2018: Received from: External Pharmacy     prochlorperazine (COMPAZINE) 5 MG tablet Take 5 mg by mouth. 12/30/2015: Received from: [facility]     traMADol (ULTRAM) 50 mg tablet Take 1-2 tablets every 6 hours as needed for fibromyalgia pain      Surgical History     Past Surgical History  She has a past surgical history that includes Cholecystectomy; Appendectomy; Colonoscopy; LASIK; ORIF zygomatic fracture; Exploratory laparotomy; Abdominal adhesion surgery; Blepharoplasty (07/2017); and Paul-en-y procedure (12/8/2010).    Objective-Exam     Constitutional:  BP (!) 135/93   Pulse (!) 116   Resp 18   Ht 5' 4.25\" (1.632 m)   Wt 201 lb (91.2 kg)   SpO2 95%   BMI 34.23 kg/m    Height: 5' 4.25\" (1.632 m) (1/8/2019  3:07 PM)  Weight: 201 lb (91.2 kg) (1/8/2019  3:07 PM)  BMI (Calculated): 34.2 (1/8/2019  3:07 PM)  SpO2: 95 % (1/8/2019  3:07 PM)    General:  Pleasant and in no acute distress   Eyes:  EOMI  ENT:  Airway 1+  Moist mucous membranes  Neck:  Supple, No LAD, No thyromegaly, No carotid bruits appreciated  Respiratory: Normal respiratory effort, no cough, wheezes or crackles  CV:  Occasionally irregular rhythm, normal rate no murmurs, pulses , no calf tenderness, no LE edema  Gastrointestinal: Abdomen NT/ND, BS+  Musculoskeletal: muscle mass WNL  Skin: color pink hair thin, incisions nicely healed  Neurological: No tremor, normal gait  Psychiatric: alert and oriented X3, mood and affect normal    Counseling     We reviewed the important post op bariatric recommendations:  -eating 3 meals daily  -eating protein first, getting >60gm protein daily  -eating slowly, chewing food well  -avoiding/limiting calorie containing beverages  -drinking water 15-30 minutes before or after meals  -choosing wheat, not white with breads, crackers, pastas, janey, bagels, tortillas, rice  -limiting " restaurant or cafeteria eating to twice a week or less    We discussed the importance of restorative sleep and stress management in maintaining a healthy weight.  We discussed the National Weight Control Registry healthy weight maintenance strategies and ways to optimize metabolism.  We discussed the importance of physical activity including cardiovascular and strength training in maintaining a healthier weight.    We discussed the importance of life-long vitamin supplementation and life-long  follow-up.    Corby was reminded that, to avoid marginal ulcers she should avoid tobacco at all, alcohol in excess, caffeine in excess, and NSAIDS (unless indicated for cardioprotection or othewise and opposed by a PPI).    Tamica Giraldo MD, FAAFP  [facility].  1/8/2019  3:25 PM      No images are attached to the encounter.   30 minutes spent with patient. >50% in counseling and coordination of care.

## 2021-07-03 NOTE — ADDENDUM NOTE
Addendum Note by Tamica Roche MD at 1/20/2017 12:05 PM     Author: Tamica Roche MD Service: -- Author Type: Physician    Filed: 1/20/2017 12:05 PM Encounter Date: 1/20/2017 Status: Signed    : Tamica Roche MD (Physician)    Addended by: TAMICA ROCHE on: 1/20/2017 12:05 PM        Modules accepted: Orders

## 2021-10-09 ENCOUNTER — HEALTH MAINTENANCE LETTER (OUTPATIENT)
Age: 66
End: 2021-10-09

## 2022-01-29 ENCOUNTER — HEALTH MAINTENANCE LETTER (OUTPATIENT)
Age: 67
End: 2022-01-29

## 2022-03-14 ENCOUNTER — TELEPHONE (OUTPATIENT)
Dept: SURGERY | Facility: CLINIC | Age: 67
End: 2022-03-14
Payer: COMMERCIAL

## 2022-03-14 DIAGNOSIS — Z98.84 HISTORY OF ROUX-EN-Y GASTRIC BYPASS: Primary | ICD-10-CM

## 2022-03-14 DIAGNOSIS — K90.9 INTESTINAL MALABSORPTION: ICD-10-CM

## 2022-03-26 ENCOUNTER — HEALTH MAINTENANCE LETTER (OUTPATIENT)
Age: 67
End: 2022-03-26

## 2022-05-04 ENCOUNTER — VIRTUAL VISIT (OUTPATIENT)
Dept: SURGERY | Facility: CLINIC | Age: 67
End: 2022-05-04
Payer: COMMERCIAL

## 2022-05-04 VITALS — BODY MASS INDEX: 35.85 KG/M2 | HEIGHT: 64 IN | WEIGHT: 210 LBS

## 2022-05-04 DIAGNOSIS — K91.2 POSTOPERATIVE MALABSORPTION: Primary | ICD-10-CM

## 2022-05-04 DIAGNOSIS — R52 PAIN: ICD-10-CM

## 2022-05-04 DIAGNOSIS — E66.01 MORBID OBESITY (H): ICD-10-CM

## 2022-05-04 PROCEDURE — 99214 OFFICE O/P EST MOD 30 MIN: CPT | Mod: GT | Performed by: FAMILY MEDICINE

## 2022-05-04 RX ORDER — CARBOXYMETHYLCELLULOSE SODIUM 5 MG/ML
1 SOLUTION/ DROPS OPHTHALMIC
COMMUNITY

## 2022-05-04 RX ORDER — BUTALBITAL, ACETAMINOPHEN AND CAFFEINE 300; 40; 50 MG/1; MG/1; MG/1
CAPSULE ORAL
COMMUNITY
Start: 2022-04-05

## 2022-05-04 RX ORDER — POLYETHYLENE GLYCOL 3350 17 G/17G
POWDER, FOR SOLUTION ORAL
COMMUNITY
Start: 2020-10-28 | End: 2022-05-04

## 2022-05-04 RX ORDER — ESTRADIOL 0.1 MG/G
CREAM VAGINAL
COMMUNITY
Start: 2020-12-01

## 2022-05-04 RX ORDER — CYCLOBENZAPRINE HCL 10 MG
10 TABLET ORAL
COMMUNITY
Start: 2021-12-15 | End: 2023-12-08

## 2022-05-04 RX ORDER — LISINOPRIL 20 MG/1
20 TABLET ORAL
COMMUNITY
Start: 2021-12-14

## 2022-05-04 RX ORDER — ATORVASTATIN CALCIUM 40 MG/1
40 TABLET, FILM COATED ORAL
COMMUNITY
Start: 2022-01-31

## 2022-05-04 RX ORDER — TRAMADOL HYDROCHLORIDE 50 MG/1
50 TABLET ORAL EVERY 8 HOURS PRN
Qty: 30 TABLET | Refills: 0 | Status: SHIPPED | OUTPATIENT
Start: 2022-05-04 | End: 2022-08-02

## 2022-05-04 NOTE — LETTER
5/4/2022         RE: Corby Bradley  [address] [place] [address]        Dear Colleague,    Thank you for referring your patient, Corby Bradley, to the [facility] [place]. Please see a copy of my visit note below.    Corby Bradley is 66 year old  female who presents for a billable video visit today.    How would you like to obtain your AVS? MyChart  If dropped from the video visit, the video invitation should be resent by: [phone]  Will anyone else be joining your video visit? No      Video Start Time: 11:15    Are there any specific questions or needs that you would like addressed at your visit today? Getting re-established with dietician and possible pain meds?      Bariatric Follow Up Visit with a History of Previous Bariatric Surgery     Date of visit: 5/4/2022  Physician: Tamica Giraldo MD, MD  Primary Care Provider:  Provider, Historical  Corby Bradley   66 year old  female    Date of Surgery: 12/8/2010  Initial Weight: 280#  Initial BMI:   Today's Weight:   Wt Readings from Last 1 Encounters:   05/04/22 95.3 kg (210 lb)     Body mass index is 35.77 kg/m .      Assessment and Plan     Assessment: Corby is a 66 year old year old female who is 12 s/p  Paul en Y Gastric Bypass with Dr. Ortiz  Corby Bradley feels as if she has achieved the goals she hoped to accomplish through bariatric surgery and weight loss.    Encounter Diagnoses   Name Primary?     Postoperative malabsorption Yes     Morbid obesity (H)      Pain          Current Outpatient Medications:      aspirin EC 81 MG EC tablet, Take 81 mg by mouth, Disp: , Rfl:      atorvastatin (LIPITOR) 40 MG tablet, Take 40 mg by mouth, Disp: , Rfl:      BIOTIN PO, Take 500 mcg by mouth daily , Disp: , Rfl:      Butalbital-APAP-Caffeine -40 MG CAPS, 1-2 tab PO 1-2 times per day PRN migraine, Disp: , Rfl:      butalbital-aspirin-caffeine (FIORINAL EQUIV) -40 MG  TABS per tablet, Take 1-2 tablets by mouth 1-2 time daily PRN, Disp: , Rfl:      Calcium Carb-Cholecalciferol 600-800 MG-UNIT CHEW, Take 2 tablets by mouth, Disp: , Rfl:      calcium Citrate-vitamin D 500-400 MG-UNIT CHEW, Take 1 tablet by mouth, Disp: , Rfl:      carboxymethylcellulose (REFRESH PLUS) 0.5 % SOLN ophthalmic solution, 1 drop, Disp: , Rfl:      cyanocobalamin 1000 MCG SUBL sublingual tablet, Take 1 tablet every other day., Disp: , Rfl:      cyclobenzaprine (FLEXERIL) 10 MG tablet, Take 10 mg by mouth, Disp: , Rfl:      erythromycin (ROMYCIN) ophthalmic ointment, Apply small amount to incision sites three times daily until tube runs out., Disp: 3.5 g, Rfl: 0     estradiol (ESTRACE) 0.1 MG/GM vaginal cream, , Disp: , Rfl:      guaiFENesin-codeine (GUAIFENESIN AC) 100-10 MG/5ML SYRP syrup, Take 5 mLs by mouth, Disp: , Rfl:      HYDROcodone-acetaminophen (NORCO) 5-325 MG per tablet, Take 1 tablet by mouth every 6 hours as needed for pain Maximum of 4000 mg of acetaminophen in 24 hours., Disp: 10 tablet, Rfl: 0     lisinopril (ZESTRIL) 20 MG tablet, Take 20 mg by mouth, Disp: , Rfl:      metFORMIN (GLUCOPHAGE) 500 MG tablet, Take 500 mg by mouth, Disp: , Rfl:      metroNIDAZOLE (METROCREAM) 0.75 % cream, Apply topically daily , Disp: , Rfl:      Multiple Vitamins-Minerals (MULTIVITAMIN ADULT) CHEW, Take 2 tablets by mouth every morning , Disp: , Rfl:      omeprazole (PRILOSEC) 20 MG DR capsule, Take 20 mg by mouth, Disp: , Rfl:      oxyCODONE (ROXICODONE) 5 MG IR tablet, Take 5 mg by mouth every 6 hours as needed , Disp: , Rfl:      polyethylene glycol (MIRALAX/GLYCOLAX) powder, Take 17 g by mouth every morning , Disp: , Rfl:      prochlorperazine (COMPAZINE) 5 MG tablet, Take 5 mg by mouth, Disp: , Rfl:      traMADol (ULTRAM) 50 MG tablet, Take 1 tablet (50 mg) by mouth every 8 hours as needed for severe pain, Disp: 30 tablet, Rfl: 0     vitamin D3 (CHOLECALCIFEROL) 125 MCG (5000 UT) tablet, Take 5,000  "Units by mouth, Disp: , Rfl:      traMADol (ULTRAM) 50 MG tablet, Take 50 mg by mouth every 8 hours as needed , Disp: , Rfl:     Plan: DEXA given moderate risk for fracture per screen. Continue vitamins as is. Refill tramadol for acute on chronic pain (she rarely uses this and provider choice to avoid NSAIDS) #30. Dietitian.      Return in about 1 year (around 5/4/2023).    Bariatric Surgery Review     Interim History/LifeChanges: Maintaining a 70# weight loss. Taking her vitamins with consistency. Feeling well. Spending 2 weeks in [place].     Patient Concerns: follow up   Appetite (1-10): OK  GERD: no    Medication changes: no    Vitamin Intake:   B-12   SL   MVI  2/d   Vitamin D  5,000   Calcium   citrate     Other                LABS: \"Reviewed  A1c 6.3. Lipids and vitamins excellent  Nausea no  Vomiting no  Constipation no  Diarrhea no  Rashes no  Hair Loss no  Calf tenderness no  Breathing difficulty no  Reactive Hypoglycemia yes  Light Headedness    Moods grieving the loss of her HS friend. Moods OK    12 point ROS as above and otherwise negative      Habits:  Alcohol: no  Tobacco: no  Caffeine 1/2, 1/2  NSAIDS no  Exercise Routine: walking with her HS friend  3 meals/day yes  Protein first yes  60grams/day  Water Separate from meals yes  Calorie Containing Beverages no  Restaurant eating/wk <2 at home now on vacation  Sleeping OK  Stress low/mod  CPAP:   Contraception: PM  DEXA:fracture risk moderate. DEXA ordered.    Social History     Social History     Socioeconomic History     Marital status:      Spouse name: Not on file     Number of children: Not on file     Years of education: Not on file     Highest education level: Not on file   Occupational History     Not on file   Tobacco Use     Smoking status: Former Smoker     Smokeless tobacco: Never Used     Tobacco comment: Quit in 1986   Substance and Sexual Activity     Alcohol use: Yes     Alcohol/week: 0.0 standard drinks     Comment: Alcoholic " Drinks/day: Rarely     Drug use: No     Sexual activity: Yes     Partners: Male     Birth control/protection: Other   Other Topics Concern     Not on file   Social History Narrative    .      Social Determinants of Health     Financial Resource Strain: Not on file   Food Insecurity: Not on file   Transportation Needs: Not on file   Physical Activity: Not on file   Stress: Not on file   Social Connections: Not on file   Intimate Partner Violence: Not on file   Housing Stability: Not on file       Past Medical History     Past Medical History:   Diagnosis Date     Back pain      Controlled type 2 diabetes mellitus without complication, without long-term current use of insulin (H) 1/1/2018     Fibromyalgia      Fibromyalgia, primary      History of anesthesia complications     difficult to wake up/bad headache once     History of Paul-en-Y gastric bypass 2010     Hyperlipemia      Hypertension      Hypertension      Iron deficiency      Knee pain      Lactose intolerance      Migraine      Migraine      Morbid obesity (H)      Noninfectious ileitis      Postoperative malabsorption      Sleep apnea      Urinary incontinence      Problem List     Patient Active Problem List   Diagnosis     Fibromyalgia, primary     Back pain     Knee pain     Migraine     Obesity (BMI 30-39.9)     History of Paul-en-Y gastric bypass     Postoperative malabsorption     Controlled type 2 diabetes mellitus without complication, without long-term current use of insulin (H)     Hypertension     Hypertriglyceridemia     Irritable bowel syndrome     Lactose intolerance     Lumbar disc disease     Pure hypercholesterolemia     Rosacea     Sleep apnea     Status post gastric bypass for obesity     Morbid obesity (H)     Medications       Current Outpatient Medications:      aspirin EC 81 MG EC tablet, Take 81 mg by mouth, Disp: , Rfl:      atorvastatin (LIPITOR) 40 MG tablet, Take 40 mg by mouth, Disp: , Rfl:      BIOTIN PO, Take 500 mcg  by mouth daily , Disp: , Rfl:      Butalbital-APAP-Caffeine -40 MG CAPS, 1-2 tab PO 1-2 times per day PRN migraine, Disp: , Rfl:      butalbital-aspirin-caffeine (FIORINAL EQUIV) -40 MG TABS per tablet, Take 1-2 tablets by mouth 1-2 time daily PRN, Disp: , Rfl:      Calcium Carb-Cholecalciferol 600-800 MG-UNIT CHEW, Take 2 tablets by mouth, Disp: , Rfl:      calcium Citrate-vitamin D 500-400 MG-UNIT CHEW, Take 1 tablet by mouth, Disp: , Rfl:      carboxymethylcellulose (REFRESH PLUS) 0.5 % SOLN ophthalmic solution, 1 drop, Disp: , Rfl:      cyanocobalamin 1000 MCG SUBL sublingual tablet, Take 1 tablet every other day., Disp: , Rfl:      cyclobenzaprine (FLEXERIL) 10 MG tablet, Take 10 mg by mouth, Disp: , Rfl:      erythromycin (ROMYCIN) ophthalmic ointment, Apply small amount to incision sites three times daily until tube runs out., Disp: 3.5 g, Rfl: 0     estradiol (ESTRACE) 0.1 MG/GM vaginal cream, , Disp: , Rfl:      guaiFENesin-codeine (GUAIFENESIN AC) 100-10 MG/5ML SYRP syrup, Take 5 mLs by mouth, Disp: , Rfl:      HYDROcodone-acetaminophen (NORCO) 5-325 MG per tablet, Take 1 tablet by mouth every 6 hours as needed for pain Maximum of 4000 mg of acetaminophen in 24 hours., Disp: 10 tablet, Rfl: 0     lisinopril (ZESTRIL) 20 MG tablet, Take 20 mg by mouth, Disp: , Rfl:      metFORMIN (GLUCOPHAGE) 500 MG tablet, Take 500 mg by mouth, Disp: , Rfl:      metroNIDAZOLE (METROCREAM) 0.75 % cream, Apply topically daily , Disp: , Rfl:      Multiple Vitamins-Minerals (MULTIVITAMIN ADULT) CHEW, Take 2 tablets by mouth every morning , Disp: , Rfl:      omeprazole (PRILOSEC) 20 MG DR capsule, Take 20 mg by mouth, Disp: , Rfl:      oxyCODONE (ROXICODONE) 5 MG IR tablet, Take 5 mg by mouth every 6 hours as needed , Disp: , Rfl:      polyethylene glycol (MIRALAX/GLYCOLAX) powder, Take 17 g by mouth every morning , Disp: , Rfl:      prochlorperazine (COMPAZINE) 5 MG tablet, Take 5 mg by mouth, Disp: , Rfl:       "traMADol (ULTRAM) 50 MG tablet, Take 1 tablet (50 mg) by mouth every 8 hours as needed for severe pain, Disp: 30 tablet, Rfl: 0     vitamin D3 (CHOLECALCIFEROL) 125 MCG (5000 UT) tablet, Take 5,000 Units by mouth, Disp: , Rfl:      traMADol (ULTRAM) 50 MG tablet, Take 50 mg by mouth every 8 hours as needed , Disp: , Rfl:    Surgical History     Past Surgical History  She has a past surgical history that includes Enhance Laser Refractive Bilateral Existing Pt In Parameters (Bilateral, 2001); appendectomy; Cholecystectomy; Abdomen surgery; wisdom teeth extraction; Head and neck surgery; Blepharoplasty bilateral (Bilateral, 7/19/2017); Cholecystectomy; appendectomy; Colonoscopy; Lasik; Orif Zygomatic Fracture; Laparotomy exploratory; Abdominal Adhesion Surgery; Blepharoplasty (07/2017); and Revision Paul-En-Y (12/8/2010).    Objective-Exam     Constitutional:  Ht 1.632 m (5' 4.25\")   Wt 95.3 kg (210 lb)   BMI 35.77 kg/m    General:  Pleasant and in no acute distress     Psychiatric: alert and oriented X3, mood and affect normal    Counseling     We reviewed the important post op bariatric recommendations:  -eating 3 meals daily  -eating protein first, getting >60gm protein daily  -eating slowly, chewing food well  -avoiding/limiting calorie containing beverages  -drinking water 15-30 minutes before or after meals  -choosing wheat, not white with breads, crackers, pastas, janey, bagels, tortillas, rice  -limiting restaurant or cafeteria eating to twice a week or less    We discussed the importance of restorative sleep and stress management in maintaining a healthy weight.  We discussed the National Weight Control Registry healthy weight maintenance strategies and ways to optimize metabolism.  We discussed the importance of physical activity including cardiovascular and strength training in maintaining a healthier weight.    We discussed the importance of life-long vitamin supplementation and life-long  " follow-up.    Corby was reminded that, to avoid marginal ulcers she should avoid tobacco at all, alcohol in excess, caffeine in excess, and NSAIDS (unless indicated for cardioprotection or othewise and opposed by a PPI).    Tamica Giraldo MD, MD, [facility].  5/4/2022  11:18 AM  Total time spent on the date of this encounter doing: chart review, review of test results, patient visit, physical exam, education, counseling, developing plan of care, and documenting = 30 minutes.      Video-Visit Details    Type of service:  Video Visit    Video End Time (time video stopped): 11:45  Originating Location (pt. Location): Home    Distant Location (provider location):  [facility]     Platform used for Video Visit: Davion      Again, thank you for allowing me to participate in the care of your patient.        Sincerely,        Tamica Giraldo MD

## 2022-05-04 NOTE — PROGRESS NOTES
Corby Bradley is 66 year old  female who presents for a billable video visit today.    How would you like to obtain your AVS? MyChart  If dropped from the video visit, the video invitation should be resent by: [phone]  Will anyone else be joining your video visit? No      Video Start Time: 11:15    Are there any specific questions or needs that you would like addressed at your visit today? Getting re-established with dietician and possible pain meds?      Bariatric Follow Up Visit with a History of Previous Bariatric Surgery     Date of visit: 5/4/2022  Physician: Tamica Giraldo MD, MD  Primary Care Provider:  Provider, Historical  Corby Bradley   66 year old  female    Date of Surgery: 12/8/2010  Initial Weight: 280#  Initial BMI:   Today's Weight:   Wt Readings from Last 1 Encounters:   05/04/22 95.3 kg (210 lb)     Body mass index is 35.77 kg/m .      Assessment and Plan     Assessment: Corby is a 66 year old year old female who is 12 s/p  Paul en Y Gastric Bypass with Dr. Ortiz  Corby Bradley feels as if she has achieved the goals she hoped to accomplish through bariatric surgery and weight loss.    Encounter Diagnoses   Name Primary?     Postoperative malabsorption Yes     Morbid obesity (H)      Pain          Current Outpatient Medications:      aspirin EC 81 MG EC tablet, Take 81 mg by mouth, Disp: , Rfl:      atorvastatin (LIPITOR) 40 MG tablet, Take 40 mg by mouth, Disp: , Rfl:      BIOTIN PO, Take 500 mcg by mouth daily , Disp: , Rfl:      Butalbital-APAP-Caffeine -40 MG CAPS, 1-2 tab PO 1-2 times per day PRN migraine, Disp: , Rfl:      butalbital-aspirin-caffeine (FIORINAL EQUIV) -40 MG TABS per tablet, Take 1-2 tablets by mouth 1-2 time daily PRN, Disp: , Rfl:      Calcium Carb-Cholecalciferol 600-800 MG-UNIT CHEW, Take 2 tablets by mouth, Disp: , Rfl:      calcium Citrate-vitamin D 500-400 MG-UNIT CHEW, Take 1 tablet by mouth, Disp: , Rfl:      carboxymethylcellulose  (REFRESH PLUS) 0.5 % SOLN ophthalmic solution, 1 drop, Disp: , Rfl:      cyanocobalamin 1000 MCG SUBL sublingual tablet, Take 1 tablet every other day., Disp: , Rfl:      cyclobenzaprine (FLEXERIL) 10 MG tablet, Take 10 mg by mouth, Disp: , Rfl:      erythromycin (ROMYCIN) ophthalmic ointment, Apply small amount to incision sites three times daily until tube runs out., Disp: 3.5 g, Rfl: 0     estradiol (ESTRACE) 0.1 MG/GM vaginal cream, , Disp: , Rfl:      guaiFENesin-codeine (GUAIFENESIN AC) 100-10 MG/5ML SYRP syrup, Take 5 mLs by mouth, Disp: , Rfl:      HYDROcodone-acetaminophen (NORCO) 5-325 MG per tablet, Take 1 tablet by mouth every 6 hours as needed for pain Maximum of 4000 mg of acetaminophen in 24 hours., Disp: 10 tablet, Rfl: 0     lisinopril (ZESTRIL) 20 MG tablet, Take 20 mg by mouth, Disp: , Rfl:      metFORMIN (GLUCOPHAGE) 500 MG tablet, Take 500 mg by mouth, Disp: , Rfl:      metroNIDAZOLE (METROCREAM) 0.75 % cream, Apply topically daily , Disp: , Rfl:      Multiple Vitamins-Minerals (MULTIVITAMIN ADULT) CHEW, Take 2 tablets by mouth every morning , Disp: , Rfl:      omeprazole (PRILOSEC) 20 MG DR capsule, Take 20 mg by mouth, Disp: , Rfl:      oxyCODONE (ROXICODONE) 5 MG IR tablet, Take 5 mg by mouth every 6 hours as needed , Disp: , Rfl:      polyethylene glycol (MIRALAX/GLYCOLAX) powder, Take 17 g by mouth every morning , Disp: , Rfl:      prochlorperazine (COMPAZINE) 5 MG tablet, Take 5 mg by mouth, Disp: , Rfl:      traMADol (ULTRAM) 50 MG tablet, Take 1 tablet (50 mg) by mouth every 8 hours as needed for severe pain, Disp: 30 tablet, Rfl: 0     vitamin D3 (CHOLECALCIFEROL) 125 MCG (5000 UT) tablet, Take 5,000 Units by mouth, Disp: , Rfl:      traMADol (ULTRAM) 50 MG tablet, Take 50 mg by mouth every 8 hours as needed , Disp: , Rfl:     Plan: DEXA given moderate risk for fracture per screen. Continue vitamins as is. Refill tramadol for acute on chronic pain (she rarely uses this and provider  "choice to avoid NSAIDS) #30. Dietitian.      Return in about 1 year (around 5/4/2023).    Bariatric Surgery Review     Interim History/LifeChanges: Maintaining a 70# weight loss. Taking her vitamins with consistency. Feeling well. Spending 2 weeks in [place].     Patient Concerns: follow up   Appetite (1-10): OK  GERD: no    Medication changes: no    Vitamin Intake:   B-12   SL   MVI  2/d   Vitamin D  5,000   Calcium   citrate     Other                LABS: \"Reviewed  A1c 6.3. Lipids and vitamins excellent  Nausea no  Vomiting no  Constipation no  Diarrhea no  Rashes no  Hair Loss no  Calf tenderness no  Breathing difficulty no  Reactive Hypoglycemia yes  Light Headedness    Moods grieving the loss of her HS friend. Moods OK    12 point ROS as above and otherwise negative      Habits:  Alcohol: no  Tobacco: no  Caffeine 1/2, 1/2  NSAIDS no  Exercise Routine: walking with her HS friend  3 meals/day yes  Protein first yes  60grams/day  Water Separate from meals yes  Calorie Containing Beverages no  Restaurant eating/wk <2 at home now on vacation  Sleeping OK  Stress low/mod  CPAP:   Contraception: PM  DEXA:fracture risk moderate. DEXA ordered.    Social History     Social History     Socioeconomic History     Marital status:      Spouse name: Not on file     Number of children: Not on file     Years of education: Not on file     Highest education level: Not on file   Occupational History     Not on file   Tobacco Use     Smoking status: Former Smoker     Smokeless tobacco: Never Used     Tobacco comment: Quit in 1986   Substance and Sexual Activity     Alcohol use: Yes     Alcohol/week: 0.0 standard drinks     Comment: Alcoholic Drinks/day: Rarely     Drug use: No     Sexual activity: Yes     Partners: Male     Birth control/protection: Other   Other Topics Concern     Not on file   Social History Narrative    .      Social Determinants of Health     Financial Resource Strain: Not on file   Food " Insecurity: Not on file   Transportation Needs: Not on file   Physical Activity: Not on file   Stress: Not on file   Social Connections: Not on file   Intimate Partner Violence: Not on file   Housing Stability: Not on file       Past Medical History     Past Medical History:   Diagnosis Date     Back pain      Controlled type 2 diabetes mellitus without complication, without long-term current use of insulin (H) 1/1/2018     Fibromyalgia      Fibromyalgia, primary      History of anesthesia complications     difficult to wake up/bad headache once     History of Paul-en-Y gastric bypass 2010     Hyperlipemia      Hypertension      Hypertension      Iron deficiency      Knee pain      Lactose intolerance      Migraine      Migraine      Morbid obesity (H)      Noninfectious ileitis      Postoperative malabsorption      Sleep apnea      Urinary incontinence      Problem List     Patient Active Problem List   Diagnosis     Fibromyalgia, primary     Back pain     Knee pain     Migraine     Obesity (BMI 30-39.9)     History of Paul-en-Y gastric bypass     Postoperative malabsorption     Controlled type 2 diabetes mellitus without complication, without long-term current use of insulin (H)     Hypertension     Hypertriglyceridemia     Irritable bowel syndrome     Lactose intolerance     Lumbar disc disease     Pure hypercholesterolemia     Rosacea     Sleep apnea     Status post gastric bypass for obesity     Morbid obesity (H)     Medications       Current Outpatient Medications:      aspirin EC 81 MG EC tablet, Take 81 mg by mouth, Disp: , Rfl:      atorvastatin (LIPITOR) 40 MG tablet, Take 40 mg by mouth, Disp: , Rfl:      BIOTIN PO, Take 500 mcg by mouth daily , Disp: , Rfl:      Butalbital-APAP-Caffeine -40 MG CAPS, 1-2 tab PO 1-2 times per day PRN migraine, Disp: , Rfl:      butalbital-aspirin-caffeine (FIORINAL EQUIV) -40 MG TABS per tablet, Take 1-2 tablets by mouth 1-2 time daily PRN, Disp: , Rfl:       Calcium Carb-Cholecalciferol 600-800 MG-UNIT CHEW, Take 2 tablets by mouth, Disp: , Rfl:      calcium Citrate-vitamin D 500-400 MG-UNIT CHEW, Take 1 tablet by mouth, Disp: , Rfl:      carboxymethylcellulose (REFRESH PLUS) 0.5 % SOLN ophthalmic solution, 1 drop, Disp: , Rfl:      cyanocobalamin 1000 MCG SUBL sublingual tablet, Take 1 tablet every other day., Disp: , Rfl:      cyclobenzaprine (FLEXERIL) 10 MG tablet, Take 10 mg by mouth, Disp: , Rfl:      erythromycin (ROMYCIN) ophthalmic ointment, Apply small amount to incision sites three times daily until tube runs out., Disp: 3.5 g, Rfl: 0     estradiol (ESTRACE) 0.1 MG/GM vaginal cream, , Disp: , Rfl:      guaiFENesin-codeine (GUAIFENESIN AC) 100-10 MG/5ML SYRP syrup, Take 5 mLs by mouth, Disp: , Rfl:      HYDROcodone-acetaminophen (NORCO) 5-325 MG per tablet, Take 1 tablet by mouth every 6 hours as needed for pain Maximum of 4000 mg of acetaminophen in 24 hours., Disp: 10 tablet, Rfl: 0     lisinopril (ZESTRIL) 20 MG tablet, Take 20 mg by mouth, Disp: , Rfl:      metFORMIN (GLUCOPHAGE) 500 MG tablet, Take 500 mg by mouth, Disp: , Rfl:      metroNIDAZOLE (METROCREAM) 0.75 % cream, Apply topically daily , Disp: , Rfl:      Multiple Vitamins-Minerals (MULTIVITAMIN ADULT) CHEW, Take 2 tablets by mouth every morning , Disp: , Rfl:      omeprazole (PRILOSEC) 20 MG DR capsule, Take 20 mg by mouth, Disp: , Rfl:      oxyCODONE (ROXICODONE) 5 MG IR tablet, Take 5 mg by mouth every 6 hours as needed , Disp: , Rfl:      polyethylene glycol (MIRALAX/GLYCOLAX) powder, Take 17 g by mouth every morning , Disp: , Rfl:      prochlorperazine (COMPAZINE) 5 MG tablet, Take 5 mg by mouth, Disp: , Rfl:      traMADol (ULTRAM) 50 MG tablet, Take 1 tablet (50 mg) by mouth every 8 hours as needed for severe pain, Disp: 30 tablet, Rfl: 0     vitamin D3 (CHOLECALCIFEROL) 125 MCG (5000 UT) tablet, Take 5,000 Units by mouth, Disp: , Rfl:      traMADol (ULTRAM) 50 MG tablet, Take 50 mg by  "mouth every 8 hours as needed , Disp: , Rfl:    Surgical History     Past Surgical History  She has a past surgical history that includes Enhance Laser Refractive Bilateral Existing Pt In Parameters (Bilateral, 2001); appendectomy; Cholecystectomy; Abdomen surgery; wisdom teeth extraction; Head and neck surgery; Blepharoplasty bilateral (Bilateral, 7/19/2017); Cholecystectomy; appendectomy; Colonoscopy; Lasik; Orif Zygomatic Fracture; Laparotomy exploratory; Abdominal Adhesion Surgery; Blepharoplasty (07/2017); and Revision Paul-En-Y (12/8/2010).    Objective-Exam     Constitutional:  Ht 1.632 m (5' 4.25\")   Wt 95.3 kg (210 lb)   BMI 35.77 kg/m    General:  Pleasant and in no acute distress     Psychiatric: alert and oriented X3, mood and affect normal    Counseling     We reviewed the important post op bariatric recommendations:  -eating 3 meals daily  -eating protein first, getting >60gm protein daily  -eating slowly, chewing food well  -avoiding/limiting calorie containing beverages  -drinking water 15-30 minutes before or after meals  -choosing wheat, not white with breads, crackers, pastas, janey, bagels, tortillas, rice  -limiting restaurant or cafeteria eating to twice a week or less    We discussed the importance of restorative sleep and stress management in maintaining a healthy weight.  We discussed the National Weight Control Registry healthy weight maintenance strategies and ways to optimize metabolism.  We discussed the importance of physical activity including cardiovascular and strength training in maintaining a healthier weight.    We discussed the importance of life-long vitamin supplementation and life-long  follow-up.    Corby was reminded that, to avoid marginal ulcers she should avoid tobacco at all, alcohol in excess, caffeine in excess, and NSAIDS (unless indicated for cardioprotection or othewise and opposed by a PPI).    Tamica Giraldo MD, MD, FAAFP  [facility].  5/4/2022  11:18 AM  Total time spent on the date of this encounter doing: chart review, review of test results, patient visit, physical exam, education, counseling, developing plan of care, and documenting = 30 minutes.      Video-Visit Details    Type of service:  Video Visit    Video End Time (time video stopped): 11:45  Originating Location (pt. Location): Home    Distant Location (provider location):  [facility] AND BARIATR[facility]     Platform used for Video Visit: Davion

## 2022-05-19 ENCOUNTER — VIRTUAL VISIT (OUTPATIENT)
Dept: SURGERY | Facility: CLINIC | Age: 67
End: 2022-05-19
Payer: COMMERCIAL

## 2022-05-19 ENCOUNTER — TELEPHONE (OUTPATIENT)
Dept: SURGERY | Facility: CLINIC | Age: 67
End: 2022-05-19
Payer: COMMERCIAL

## 2022-05-19 DIAGNOSIS — Z98.84 BARIATRIC SURGERY STATUS: Primary | ICD-10-CM

## 2022-05-19 DIAGNOSIS — E66.812 OBESITY, CLASS II, BMI 35-39.9, ISOLATED (SEE ACTUAL BMI): ICD-10-CM

## 2022-05-19 PROCEDURE — 97802 MEDICAL NUTRITION INDIV IN: CPT | Mod: 95 | Performed by: DIETITIAN, REGISTERED

## 2022-05-19 NOTE — LETTER
5/19/2022         RE: Corby Bradley  [address] [place] [address]        Dear Colleague,    Thank you for referring your patient, Corby Bradley, to the [facility] [place]. Please see a copy of my visit note below.    Corby Bradley is a 66 year old who is being evaluated via a billable telephone visit.      What phone number would you like to be contacted at? [phone]  How would you like to obtain your AVS? MyChart      Post-op Surgical Weight Loss Diet Evaluation     Assessment:  Pt presents for 12 years post-op RD visit, s/p RYGB on 12-8-2010 with Dr. Ortiz. Today we reviewed current eating habits and level of physical activity, and instructed on the changes that are required for successful bariatric outcomes.    Patient Progress: states her weight won't move- frustrating  +lowest weight was 200lb  +goal weight is 150lb    Initial weight: 280lb  Current weight: 210lb  Weight change: down 70lb    BMI: 35.77    Patient Active Problem List   Diagnosis     Fibromyalgia, primary     Back pain     Knee pain     Migraine     Obesity (BMI 30-39.9)     History of Paul-en-Y gastric bypass     Postoperative malabsorption     Controlled type 2 diabetes mellitus without complication, without long-term current use of insulin (H)     Hypertension     Hypertriglyceridemia     Irritable bowel syndrome     Lactose intolerance     Lumbar disc disease     Pure hypercholesterolemia     Rosacea     Sleep apnea     Status post gastric bypass for obesity     Morbid obesity (H)       Diabetes: metformin- A1c is stable    Vitamins   Multi Vit with Iron: yes  Calcium Citrate: yes  B12: yes  D3: yes      Nausea: might get a bit sick to stomach if overeating  Vomiting: no  Diarrhea: no  Constipation: no  Hair loss:no  +eating protein first and this really helps    Diet Recall/Time:   Breakfast: couple hard boiled eggs  Lunch: salad with a bit of cheese  Dinner: fish,  "broccoli and grapes    Typical Snacks: apple and piece of cheese- fruit or yogurt  +might have a protein shake    Meal Duration:15-20    Fluid-meal separation:  Fluids are  30min before and 30 minutes after meals.    Fluid Intake  Water: drinking a lot of water   Alcohol: none    Exercise: walking- has a treadmill at home- has a friend that she walks with a couple times per week      PES statement:      (NC-1.4) Altered GI Function related to Alteration in gastrointestinal tract structure and/or function/ Decreased functional length of the GI tract as evidenced by Weight loss of 25% initial body weight; Gastric bypass surgery    Intervention    Discussion  1. Recommended to consume 15-20gm protein at 3 meals daily, along with protein supplement/\"planned protein containing snack\" of 15-30gm protein, to reach goal of 60-80 gm protein daily.  2. Reviewed lean protein sources  3. Bariatric Plate Method-  including lean/low fat protein at each meal, including a vegetable/fruit, and limiting carbohydrate intake to less than 25% of plate volume.     Instructions  1. Include 15-20gm protein at each meal, along with protein supplement/\"planned protein containing snack\" of 15-30gm protein, to reach goal of 60-80 gm protein daily.  2. Increase fluid intake to 64oz daily: choose plain or calorie/carbonation-free beverages.  3. Incorporate daily structured activity, 30-60 minutes most days of the week  4. Recommended pt to start taking: Multi Vit + iron 2x/day, calcium citrate 400-600 mg 2x/day, 8735-8299 mcg of Sublingual B-12 daily, and 5000 IU Vitamin D3 daily. (MVI and calcium can be taken at the same time)  5. Read food labels more consistently: keeping total fat grams <10, total sugar grams <10, fiber >3gm per serving.  6. Increase vegetable/fruit intake, by having a vegetable or fruit with each meal daily.  7. Practice plate method: 1/2 plate lean/low fat protein source, vegetable/fruit, <25% of plate complex " carbohydrates.  8. Separate fluids 30 minutes before/after meal times.  9. Practice eating off of smaller plates/bowls, chewing to applesauce consistency, taking 20-30 minutes to eat in a calm/relaxed environment without distractions of tv/email/cell phone.    Handouts provided:  18 months Post-Op Nutritional Guidelines for RYGB  Bariatric recipe resources  Snack ideas    Assessment/Plan:    Pt to follow up in 2 months with bariatrician    Phone call duration: 20 minutes    BRIANNA AGUILAR RD          Again, thank you for allowing me to participate in the care of your patient.        Sincerely,        BRIANNA AGUILAR RD

## 2022-05-19 NOTE — PROGRESS NOTES
Corby Bradley is a 66 year old who is being evaluated via a billable telephone visit.      What phone number would you like to be contacted at? [phone]  How would you like to obtain your AVS? Olivia      Post-op Surgical Weight Loss Diet Evaluation     Assessment:  Pt presents for 12 years post-op RD visit, s/p RYGB on 12-8-2010 with Dr. Ortiz. Today we reviewed current eating habits and level of physical activity, and instructed on the changes that are required for successful bariatric outcomes.    Patient Progress: states her weight won't move- frustrating  +lowest weight was 200lb  +goal weight is 150lb    Initial weight: 280lb  Current weight: 210lb  Weight change: down 70lb    BMI: 35.77    Patient Active Problem List   Diagnosis     Fibromyalgia, primary     Back pain     Knee pain     Migraine     Obesity (BMI 30-39.9)     History of Paul-en-Y gastric bypass     Postoperative malabsorption     Controlled type 2 diabetes mellitus without complication, without long-term current use of insulin (H)     Hypertension     Hypertriglyceridemia     Irritable bowel syndrome     Lactose intolerance     Lumbar disc disease     Pure hypercholesterolemia     Rosacea     Sleep apnea     Status post gastric bypass for obesity     Morbid obesity (H)       Diabetes: metformin- A1c is stable    Vitamins   Multi Vit with Iron: yes  Calcium Citrate: yes  B12: yes  D3: yes      Nausea: might get a bit sick to stomach if overeating  Vomiting: no  Diarrhea: no  Constipation: no  Hair loss:no  +eating protein first and this really helps    Diet Recall/Time:   Breakfast: couple hard boiled eggs  Lunch: salad with a bit of cheese  Dinner: fish, broccoli and grapes    Typical Snacks: apple and piece of cheese- fruit or yogurt  +might have a protein shake    Meal Duration:15-20    Fluid-meal separation:  Fluids are  30min before and 30 minutes after meals.    Fluid Intake  Water: drinking a lot of water   Alcohol:  "none    Exercise: walking- has a treadmill at home- has a friend that she walks with a couple times per week      PES statement:      (NC-1.4) Altered GI Function related to Alteration in gastrointestinal tract structure and/or function/ Decreased functional length of the GI tract as evidenced by Weight loss of 25% initial body weight; Gastric bypass surgery    Intervention    Discussion  1. Recommended to consume 15-20gm protein at 3 meals daily, along with protein supplement/\"planned protein containing snack\" of 15-30gm protein, to reach goal of 60-80 gm protein daily.  2. Reviewed lean protein sources  3. Bariatric Plate Method-  including lean/low fat protein at each meal, including a vegetable/fruit, and limiting carbohydrate intake to less than 25% of plate volume.     Instructions  1. Include 15-20gm protein at each meal, along with protein supplement/\"planned protein containing snack\" of 15-30gm protein, to reach goal of 60-80 gm protein daily.  2. Increase fluid intake to 64oz daily: choose plain or calorie/carbonation-free beverages.  3. Incorporate daily structured activity, 30-60 minutes most days of the week  4. Recommended pt to start taking: Multi Vit + iron 2x/day, calcium citrate 400-600 mg 2x/day, 6198-9506 mcg of Sublingual B-12 daily, and 5000 IU Vitamin D3 daily. (MVI and calcium can be taken at the same time)  5. Read food labels more consistently: keeping total fat grams <10, total sugar grams <10, fiber >3gm per serving.  6. Increase vegetable/fruit intake, by having a vegetable or fruit with each meal daily.  7. Practice plate method: 1/2 plate lean/low fat protein source, vegetable/fruit, <25% of plate complex carbohydrates.  8. Separate fluids 30 minutes before/after meal times.  9. Practice eating off of smaller plates/bowls, chewing to applesauce consistency, taking 20-30 minutes to eat in a calm/relaxed environment without distractions of tv/email/cell phone.    Handouts provided:  18 " months Post-Op Nutritional Guidelines for RYGB  Bariatric recipe resources  Snack ideas    Assessment/Plan:    Pt to follow up in 2 months with bariatrician    Phone call duration: 20 minutes    BRIANNA AGUILAR RD

## 2022-05-20 VITALS — HEIGHT: 64 IN | WEIGHT: 210 LBS | BODY MASS INDEX: 35.85 KG/M2

## 2022-05-21 ENCOUNTER — HEALTH MAINTENANCE LETTER (OUTPATIENT)
Age: 67
End: 2022-05-21

## 2022-07-16 ENCOUNTER — HEALTH MAINTENANCE LETTER (OUTPATIENT)
Age: 67
End: 2022-07-16

## 2022-07-27 ENCOUNTER — VIRTUAL VISIT (OUTPATIENT)
Dept: SURGERY | Facility: CLINIC | Age: 67
End: 2022-07-27
Payer: COMMERCIAL

## 2022-07-27 VITALS — WEIGHT: 199 LBS | BODY MASS INDEX: 33.97 KG/M2 | HEIGHT: 64 IN

## 2022-07-27 DIAGNOSIS — E66.9 OBESITY (BMI 30-39.9): Primary | ICD-10-CM

## 2022-07-27 DIAGNOSIS — K91.2 POSTOPERATIVE MALABSORPTION: ICD-10-CM

## 2022-07-27 PROCEDURE — 99214 OFFICE O/P EST MOD 30 MIN: CPT | Mod: GT | Performed by: FAMILY MEDICINE

## 2022-07-27 RX ORDER — PHENTERMINE HYDROCHLORIDE 37.5 MG/1
18.75-37.5 TABLET ORAL
Qty: 90 TABLET | Refills: 1 | Status: SHIPPED | OUTPATIENT
Start: 2022-07-27 | End: 2023-02-01

## 2022-07-27 NOTE — LETTER
7/27/2022         RE: Corby Bradley  [address] [place] [address]        Dear Colleague,    Thank you for referring your patient, Corby Bradley, to the [facility] [place]. Please see a copy of my visit note below.    Corby Bradley is 67 year old  female who presents for a billable video visit today.    How would you like to obtain your AVS? MyChart  If dropped from the video visit, the video invitation should be resent by: Text to cell phone: [phone]  Will anyone else be joining your video visit? No  {If patient encounters technical issues they should call [phone]}    Video Start Time: 10:40    Are there any specific questions or needs that you would like addressed at your visit today? Return MWM - wants to discuss a possible new med and if it would work for her - phentermine       Bariatric Follow Up Visit with a History of Previous Bariatric Surgery     Date of visit: 7/27/2022  Physician: Tamica Giraldo MD, MD  Primary Care Provider:  Provider, Historical  Corby Bradley   67 year old  female    Date of Surgery: 12/8/2010  Initial Weight: 280#  Initial BMI:   Today's Weight:   Wt Readings from Last 1 Encounters:   07/27/22 90.3 kg (199 lb)     Body mass index is 33.89 kg/m .      Assessment and Plan     Assessment: Corby is a 67 year old year old female who is 12 yrs s/p  Paul en Y Gastric Bypass with Dr. Ortiz  Corby Bradley feels as if she has achieved the goals she hoped to accomplish through bariatric surgery and weight loss.    Encounter Diagnoses   Name Primary?     Obesity (BMI 30-39.9) Yes     Postoperative malabsorption          Current Outpatient Medications:      aspirin EC 81 MG EC tablet, Take 81 mg by mouth, Disp: , Rfl:      atorvastatin (LIPITOR) 40 MG tablet, Take 40 mg by mouth, Disp: , Rfl:      BIOTIN PO, Take 500 mcg by mouth daily , Disp: , Rfl:      Butalbital-APAP-Caffeine -40 MG  CAPS, 1-2 tab PO 1-2 times per day PRN migraine, Disp: , Rfl:      butalbital-aspirin-caffeine (FIORINAL EQUIV) -40 MG TABS per tablet, Take 1-2 tablets by mouth 1-2 time daily PRN, Disp: , Rfl:      Calcium Carb-Cholecalciferol 600-800 MG-UNIT CHEW, Take 2 tablets by mouth, Disp: , Rfl:      calcium Citrate-vitamin D 500-400 MG-UNIT CHEW, Take 1 tablet by mouth, Disp: , Rfl:      carboxymethylcellulose (REFRESH PLUS) 0.5 % SOLN ophthalmic solution, 1 drop, Disp: , Rfl:      cyanocobalamin 1000 MCG SUBL sublingual tablet, Take 1 tablet every other day., Disp: , Rfl:      cyclobenzaprine (FLEXERIL) 10 MG tablet, Take 10 mg by mouth, Disp: , Rfl:      erythromycin (ROMYCIN) ophthalmic ointment, Apply small amount to incision sites three times daily until tube runs out., Disp: 3.5 g, Rfl: 0     estradiol (ESTRACE) 0.1 MG/GM vaginal cream, , Disp: , Rfl:      guaiFENesin-codeine (GUAIFENESIN AC) 100-10 MG/5ML SYRP syrup, Take 5 mLs by mouth, Disp: , Rfl:      HYDROcodone-acetaminophen (NORCO) 5-325 MG per tablet, Take 1 tablet by mouth every 6 hours as needed for pain Maximum of 4000 mg of acetaminophen in 24 hours., Disp: 10 tablet, Rfl: 0     lisinopril (ZESTRIL) 20 MG tablet, Take 20 mg by mouth, Disp: , Rfl:      metFORMIN (GLUCOPHAGE) 500 MG tablet, Take 500 mg by mouth, Disp: , Rfl:      metroNIDAZOLE (METROCREAM) 0.75 % cream, Apply topically daily , Disp: , Rfl:      Multiple Vitamins-Minerals (MULTIVITAMIN ADULT) CHEW, Take 2 tablets by mouth every morning , Disp: , Rfl:      omeprazole (PRILOSEC) 20 MG DR capsule, Take 20 mg by mouth, Disp: , Rfl:      oxyCODONE (ROXICODONE) 5 MG IR tablet, Take 5 mg by mouth every 6 hours as needed , Disp: , Rfl:      phentermine (ADIPEX-P) 37.5 MG tablet, Take 0.5-1 tablets (18.75-37.5 mg) by mouth every morning (before breakfast) for 90 days, Disp: 90 tablet, Rfl: 1     polyethylene glycol (MIRALAX/GLYCOLAX) powder, Take 17 g by mouth every morning , Disp: , Rfl:       prochlorperazine (COMPAZINE) 5 MG tablet, Take 5 mg by mouth, Disp: , Rfl:      traMADol (ULTRAM) 50 MG tablet, Take 1 tablet (50 mg) by mouth every 8 hours as needed for severe pain, Disp: 30 tablet, Rfl: 0     traMADol (ULTRAM) 50 MG tablet, Take 50 mg by mouth every 8 hours as needed , Disp: , Rfl:      vitamin D3 (CHOLECALCIFEROL) 125 MCG (5000 UT) tablet, Take 5,000 Units by mouth, Disp: , Rfl:     Plan: Be careful to keep blood sugars stable by eating protein first. Sleeping consistent hours. Keeping blood pressure OK, Temerature regulation by hydrating the day before and watching clothing and extertion. Change shoes every 500 miles. OK to try phenermine 18.75mg in the am. Consider 8mg if too stimulating. Be careful to watch for constipation. Will cause dry mouth. Continue to follow blood pressures    Return in about 3 months (around 10/27/2022).    Bariatric Surgery Review     Interim History/LifeChanges: Walking 4 times a week with her best friend's  in different [place] along the [facility]. One hour at least. Down another 11#. Maintaining a 81#down. Would like to try phentermine. Passed out 3 weeks ago. She had missed meals as was with her brother who didn't stop for eating    Patient Concerns: would like to try phentermine. Thermoregulation has been problematic. Passed out when out of town recently.   Appetite (1-10): OK  GERD: on PPI    Medication changes: none    Vitamin Intake:   B-12   SL   MVI  2/d   Vitamin D  5,000   Calcium   citrate     Other                LABS: need to be ordered  excellent  Nausea no  Vomiting no  Constipation no  Diarrhea no  Rashes no  Hair Loss no  Calf tenderness no  Breathing difficulty no  Reactive Hypoglycemia avoids  Light Headedness passed out recently   Moods euthymic    12 point ROS as above and otherwise negative      Habits:  Alcohol: no  Tobacco: no  Caffeine am  NSAIDS no  Exercise Routine: walking on a regular basis now.   3 meals/day yes  Protein  first yes  60 grams/day  Water Separate from meals yes  Calorie Containing Beverages no  Restaurant eating/wk   Sleeping OK  Stress moderate  CPAP:   Contraception: PM  DEXA:ordered 2021    Social History     Social History     Socioeconomic History     Marital status:      Spouse name: Not on file     Number of children: Not on file     Years of education: Not on file     Highest education level: Not on file   Occupational History     Not on file   Tobacco Use     Smoking status: Former Smoker     Smokeless tobacco: Never Used     Tobacco comment: Quit in 1986   Substance and Sexual Activity     Alcohol use: Yes     Alcohol/week: 0.0 standard drinks     Comment: Alcoholic Drinks/day: Rarely     Drug use: No     Sexual activity: Yes     Partners: Male     Birth control/protection: Other   Other Topics Concern     Not on file   Social History Narrative    .      Social Determinants of Health     Financial Resource Strain: Not on file   Food Insecurity: Not on file   Transportation Needs: Not on file   Physical Activity: Not on file   Stress: Not on file   Social Connections: Not on file   Intimate Partner Violence: Not on file   Housing Stability: Not on file       Past Medical History     Past Medical History:   Diagnosis Date     Back pain      Controlled type 2 diabetes mellitus without complication, without long-term current use of insulin (H) 01/01/2018     Fibromyalgia      Fibromyalgia, primary      History of anesthesia complications     difficult to wake up/bad headache once     History of Paul-en-Y gastric bypass 01/01/2010     Hyperlipemia      Hypertension      Iron deficiency      Knee pain      Lactose intolerance      Migraine      Morbid obesity (H)      Noninfectious ileitis      Postoperative malabsorption      Sleep apnea      Urinary incontinence      Problem List     Patient Active Problem List   Diagnosis     Fibromyalgia, primary     Back pain     Knee pain     Migraine      Obesity (BMI 30-39.9)     History of Paul-en-Y gastric bypass     Postoperative malabsorption     Controlled type 2 diabetes mellitus without complication, without long-term current use of insulin (H)     Hypertension     Hypertriglyceridemia     Irritable bowel syndrome     Lactose intolerance     Lumbar disc disease     Pure hypercholesterolemia     Rosacea     Sleep apnea     Status post gastric bypass for obesity     Morbid obesity (H)     Medications       Current Outpatient Medications:      aspirin EC 81 MG EC tablet, Take 81 mg by mouth, Disp: , Rfl:      atorvastatin (LIPITOR) 40 MG tablet, Take 40 mg by mouth, Disp: , Rfl:      BIOTIN PO, Take 500 mcg by mouth daily , Disp: , Rfl:      Butalbital-APAP-Caffeine -40 MG CAPS, 1-2 tab PO 1-2 times per day PRN migraine, Disp: , Rfl:      butalbital-aspirin-caffeine (FIORINAL EQUIV) -40 MG TABS per tablet, Take 1-2 tablets by mouth 1-2 time daily PRN, Disp: , Rfl:      Calcium Carb-Cholecalciferol 600-800 MG-UNIT CHEW, Take 2 tablets by mouth, Disp: , Rfl:      calcium Citrate-vitamin D 500-400 MG-UNIT CHEW, Take 1 tablet by mouth, Disp: , Rfl:      carboxymethylcellulose (REFRESH PLUS) 0.5 % SOLN ophthalmic solution, 1 drop, Disp: , Rfl:      cyanocobalamin 1000 MCG SUBL sublingual tablet, Take 1 tablet every other day., Disp: , Rfl:      cyclobenzaprine (FLEXERIL) 10 MG tablet, Take 10 mg by mouth, Disp: , Rfl:      erythromycin (ROMYCIN) ophthalmic ointment, Apply small amount to incision sites three times daily until tube runs out., Disp: 3.5 g, Rfl: 0     estradiol (ESTRACE) 0.1 MG/GM vaginal cream, , Disp: , Rfl:      guaiFENesin-codeine (GUAIFENESIN AC) 100-10 MG/5ML SYRP syrup, Take 5 mLs by mouth, Disp: , Rfl:      HYDROcodone-acetaminophen (NORCO) 5-325 MG per tablet, Take 1 tablet by mouth every 6 hours as needed for pain Maximum of 4000 mg of acetaminophen in 24 hours., Disp: 10 tablet, Rfl: 0     lisinopril (ZESTRIL) 20 MG tablet, Take  "20 mg by mouth, Disp: , Rfl:      metFORMIN (GLUCOPHAGE) 500 MG tablet, Take 500 mg by mouth, Disp: , Rfl:      metroNIDAZOLE (METROCREAM) 0.75 % cream, Apply topically daily , Disp: , Rfl:      Multiple Vitamins-Minerals (MULTIVITAMIN ADULT) CHEW, Take 2 tablets by mouth every morning , Disp: , Rfl:      omeprazole (PRILOSEC) 20 MG DR capsule, Take 20 mg by mouth, Disp: , Rfl:      oxyCODONE (ROXICODONE) 5 MG IR tablet, Take 5 mg by mouth every 6 hours as needed , Disp: , Rfl:      phentermine (ADIPEX-P) 37.5 MG tablet, Take 0.5-1 tablets (18.75-37.5 mg) by mouth every morning (before breakfast) for 90 days, Disp: 90 tablet, Rfl: 1     polyethylene glycol (MIRALAX/GLYCOLAX) powder, Take 17 g by mouth every morning , Disp: , Rfl:      prochlorperazine (COMPAZINE) 5 MG tablet, Take 5 mg by mouth, Disp: , Rfl:      traMADol (ULTRAM) 50 MG tablet, Take 1 tablet (50 mg) by mouth every 8 hours as needed for severe pain, Disp: 30 tablet, Rfl: 0     traMADol (ULTRAM) 50 MG tablet, Take 50 mg by mouth every 8 hours as needed , Disp: , Rfl:      vitamin D3 (CHOLECALCIFEROL) 125 MCG (5000 UT) tablet, Take 5,000 Units by mouth, Disp: , Rfl:    Surgical History     Past Surgical History  She has a past surgical history that includes Enhance Laser Refractive Bilateral Existing Pt In Parameters (Bilateral, 2001); appendectomy; Cholecystectomy; Abdomen surgery; wisdom teeth extraction; Head and neck surgery; Blepharoplasty bilateral (Bilateral, 7/19/2017); Cholecystectomy; appendectomy; Colonoscopy; Lasik; Orif Zygomatic Fracture; Laparotomy exploratory; Abdominal Adhesion Surgery; Blepharoplasty (07/2017); and Revision Paul-En-Y (12/8/2010).    Objective-Exam     Constitutional:  Ht 1.632 m (5' 4.25\")   Wt 90.3 kg (199 lb)   BMI 33.89 kg/m    General:  Pleasant and in no acute distress     Psychiatric: alert and oriented X3, mood and affect normal    Counseling     We reviewed the important post op bariatric " recommendations:  -eating 3 meals daily  -eating protein first, getting >60gm protein daily  -eating slowly, chewing food well  -avoiding/limiting calorie containing beverages  -drinking water 15-30 minutes before or after meals  -choosing wheat, not white with breads, crackers, pastas, janey, bagels, tortillas, rice  -limiting restaurant or cafeteria eating to twice a week or less    We discussed the importance of restorative sleep and stress management in maintaining a healthy weight.  We discussed the National Weight Control Registry healthy weight maintenance strategies and ways to optimize metabolism.  We discussed the importance of physical activity including cardiovascular and strength training in maintaining a healthier weight.    We discussed the importance of life-long vitamin supplementation and life-long  follow-up.    Corby was reminded that, to avoid marginal ulcers she should avoid tobacco at all, alcohol in excess, caffeine in excess, and NSAIDS (unless indicated for cardioprotection or othewise and opposed by a PPI).    Tamica Giraldo MD, MD, [facility].  7/27/2022  10:50 AM  Total time spent on the date of this encounter doing: chart review, review of test results, patient visit, physical exam, education, counseling, developing plan of care, and documenting =  minutes.      Video-Visit Details    Type of service:  [id number] Visit    Video End Time (time video stopped): 11:10  Originating Location (pt. Location): Home    Distant Location (provider location):  [facility] [place]     Platform used for Video Visit: Shavonne      Again, thank you for allowing me to participate in the care of your patient.        Sincerely,        Tamica Giraldo MD

## 2022-07-27 NOTE — PROGRESS NOTES
Corby Bradley is 67 year old  female who presents for a billable video visit today.    How would you like to obtain your AVS? MyChart  If dropped from the video visit, the video invitation should be resent by: Text to cell phone: [phone]  Will anyone else be joining your video visit? No      Video Start Time: 10:40    Are there any specific questions or needs that you would like addressed at your visit today? Return MWM - wants to discuss a possible new med and if it would work for her - phentermine       Bariatric Follow Up Visit with a History of Previous Bariatric Surgery     Date of visit: 7/27/2022  Physician: Tamica Giraldo MD, MD  Primary Care Provider:  Provider, Historical  Corby Bradley   67 year old  female    Date of Surgery: 12/8/2010  Initial Weight: 280#  Initial BMI:   Today's Weight:   Wt Readings from Last 1 Encounters:   07/27/22 90.3 kg (199 lb)     Body mass index is 33.89 kg/m .      Assessment and Plan     Assessment: Corby is a 67 year old year old female who is 12 yrs s/p  Paul en Y Gastric Bypass with Dr. Ortiz  Corby Bradley feels as if she has achieved the goals she hoped to accomplish through bariatric surgery and weight loss.    Encounter Diagnoses   Name Primary?     Obesity (BMI 30-39.9) Yes     Postoperative malabsorption          Current Outpatient Medications:      aspirin EC 81 MG EC tablet, Take 81 mg by mouth, Disp: , Rfl:      atorvastatin (LIPITOR) 40 MG tablet, Take 40 mg by mouth, Disp: , Rfl:      BIOTIN PO, Take 500 mcg by mouth daily , Disp: , Rfl:      Butalbital-APAP-Caffeine -40 MG CAPS, 1-2 tab PO 1-2 times per day PRN migraine, Disp: , Rfl:      butalbital-aspirin-caffeine (FIORINAL EQUIV) -40 MG TABS per tablet, Take 1-2 tablets by mouth 1-2 time daily PRN, Disp: , Rfl:      Calcium Carb-Cholecalciferol 600-800 MG-UNIT CHEW, Take 2 tablets by mouth, Disp: , Rfl:      calcium Citrate-vitamin D 500-400 MG-UNIT CHEW, Take 1 tablet  by mouth, Disp: , Rfl:      carboxymethylcellulose (REFRESH PLUS) 0.5 % SOLN ophthalmic solution, 1 drop, Disp: , Rfl:      cyanocobalamin 1000 MCG SUBL sublingual tablet, Take 1 tablet every other day., Disp: , Rfl:      cyclobenzaprine (FLEXERIL) 10 MG tablet, Take 10 mg by mouth, Disp: , Rfl:      erythromycin (ROMYCIN) ophthalmic ointment, Apply small amount to incision sites three times daily until tube runs out., Disp: 3.5 g, Rfl: 0     estradiol (ESTRACE) 0.1 MG/GM vaginal cream, , Disp: , Rfl:      guaiFENesin-codeine (GUAIFENESIN AC) 100-10 MG/5ML SYRP syrup, Take 5 mLs by mouth, Disp: , Rfl:      HYDROcodone-acetaminophen (NORCO) 5-325 MG per tablet, Take 1 tablet by mouth every 6 hours as needed for pain Maximum of 4000 mg of acetaminophen in 24 hours., Disp: 10 tablet, Rfl: 0     lisinopril (ZESTRIL) 20 MG tablet, Take 20 mg by mouth, Disp: , Rfl:      metFORMIN (GLUCOPHAGE) 500 MG tablet, Take 500 mg by mouth, Disp: , Rfl:      metroNIDAZOLE (METROCREAM) 0.75 % cream, Apply topically daily , Disp: , Rfl:      Multiple Vitamins-Minerals (MULTIVITAMIN ADULT) CHEW, Take 2 tablets by mouth every morning , Disp: , Rfl:      omeprazole (PRILOSEC) 20 MG DR capsule, Take 20 mg by mouth, Disp: , Rfl:      oxyCODONE (ROXICODONE) 5 MG IR tablet, Take 5 mg by mouth every 6 hours as needed , Disp: , Rfl:      phentermine (ADIPEX-P) 37.5 MG tablet, Take 0.5-1 tablets (18.75-37.5 mg) by mouth every morning (before breakfast) for 90 days, Disp: 90 tablet, Rfl: 1     polyethylene glycol (MIRALAX/GLYCOLAX) powder, Take 17 g by mouth every morning , Disp: , Rfl:      prochlorperazine (COMPAZINE) 5 MG tablet, Take 5 mg by mouth, Disp: , Rfl:      traMADol (ULTRAM) 50 MG tablet, Take 1 tablet (50 mg) by mouth every 8 hours as needed for severe pain, Disp: 30 tablet, Rfl: 0     traMADol (ULTRAM) 50 MG tablet, Take 50 mg by mouth every 8 hours as needed , Disp: , Rfl:      vitamin D3 (CHOLECALCIFEROL) 125 MCG (5000 UT)  tablet, Take 5,000 Units by mouth, Disp: , Rfl:     Plan: Be careful to keep blood sugars stable by eating protein first. Sleeping consistent hours. Keeping blood pressure OK, Temerature regulation by hydrating the day before and watching clothing and extertion. Change shoes every 500 miles. OK to try phenermine 18.75mg in the am. Consider 8mg if too stimulating. Be careful to watch for constipation. Will cause dry mouth. Continue to follow blood pressures    Return in about 3 months (around 10/27/2022).    Bariatric Surgery Review     Interim History/LifeChanges: Walking 4 times a week with her best friend's  in [place] along the [facility]. One hour at least. Down another 11#. Maintaining a 81#down. Would like to try phentermine. Passed out 3 weeks ago. She had missed meals as was with her brother who didn't stop for eating    Patient Concerns: would like to try phentermine. Thermoregulation has been problematic. Passed out when out of town recently.   Appetite (1-10): OK  GERD: on PPI    Medication changes: none    Vitamin Intake:   B-12   SL   MVI  2/d   Vitamin D  5,000   Calcium   citrate     Other                LABS: need to be ordered  excellent  Nausea no  Vomiting no  Constipation no  Diarrhea no  Rashes no  Hair Loss no  Calf tenderness no  Breathing difficulty no  Reactive Hypoglycemia avoids  Light Headedness passed out recently   Moods euthymic    12 point ROS as above and otherwise negative      Habits:  Alcohol: no  Tobacco: no  Caffeine am  NSAIDS no  Exercise Routine: walking on a regular basis now.   3 meals/day yes  Protein first yes  60 grams/day  Water Separate from meals yes  Calorie Containing Beverages no  Restaurant eating/wk   Sleeping OK  Stress moderate  CPAP:   Contraception: PM  DEXA:ordered 2021    Social History     Social History     Socioeconomic History     Marital status:      Spouse name: Not on file     Number of children: Not on file     Years of  education: Not on file     Highest education level: Not on file   Occupational History     Not on file   Tobacco Use     Smoking status: Former Smoker     Smokeless tobacco: Never Used     Tobacco comment: Quit in 1986   Substance and Sexual Activity     Alcohol use: Yes     Alcohol/week: 0.0 standard drinks     Comment: Alcoholic Drinks/day: Rarely     Drug use: No     Sexual activity: Yes     Partners: Male     Birth control/protection: Other   Other Topics Concern     Not on file   Social History Narrative    .      Social Determinants of Health     Financial Resource Strain: Not on file   Food Insecurity: Not on file   Transportation Needs: Not on file   Physical Activity: Not on file   Stress: Not on file   Social Connections: Not on file   Intimate Partner Violence: Not on file   Housing Stability: Not on file       Past Medical History     Past Medical History:   Diagnosis Date     Back pain      Controlled type 2 diabetes mellitus without complication, without long-term current use of insulin (H) 01/01/2018     Fibromyalgia      Fibromyalgia, primary      History of anesthesia complications     difficult to wake up/bad headache once     History of Paul-en-Y gastric bypass 01/01/2010     Hyperlipemia      Hypertension      Iron deficiency      Knee pain      Lactose intolerance      Migraine      Morbid obesity (H)      Noninfectious ileitis      Postoperative malabsorption      Sleep apnea      Urinary incontinence      Problem List     Patient Active Problem List   Diagnosis     Fibromyalgia, primary     Back pain     Knee pain     Migraine     Obesity (BMI 30-39.9)     History of Paul-en-Y gastric bypass     Postoperative malabsorption     Controlled type 2 diabetes mellitus without complication, without long-term current use of insulin (H)     Hypertension     Hypertriglyceridemia     Irritable bowel syndrome     Lactose intolerance     Lumbar disc disease     Pure hypercholesterolemia     Rosacea      Sleep apnea     Status post gastric bypass for obesity     Morbid obesity (H)     Medications       Current Outpatient Medications:      aspirin EC 81 MG EC tablet, Take 81 mg by mouth, Disp: , Rfl:      atorvastatin (LIPITOR) 40 MG tablet, Take 40 mg by mouth, Disp: , Rfl:      BIOTIN PO, Take 500 mcg by mouth daily , Disp: , Rfl:      Butalbital-APAP-Caffeine -40 MG CAPS, 1-2 tab PO 1-2 times per day PRN migraine, Disp: , Rfl:      butalbital-aspirin-caffeine (FIORINAL EQUIV) -40 MG TABS per tablet, Take 1-2 tablets by mouth 1-2 time daily PRN, Disp: , Rfl:      Calcium Carb-Cholecalciferol 600-800 MG-UNIT CHEW, Take 2 tablets by mouth, Disp: , Rfl:      calcium Citrate-vitamin D 500-400 MG-UNIT CHEW, Take 1 tablet by mouth, Disp: , Rfl:      carboxymethylcellulose (REFRESH PLUS) 0.5 % SOLN ophthalmic solution, 1 drop, Disp: , Rfl:      cyanocobalamin 1000 MCG SUBL sublingual tablet, Take 1 tablet every other day., Disp: , Rfl:      cyclobenzaprine (FLEXERIL) 10 MG tablet, Take 10 mg by mouth, Disp: , Rfl:      erythromycin (ROMYCIN) ophthalmic ointment, Apply small amount to incision sites three times daily until tube runs out., Disp: 3.5 g, Rfl: 0     estradiol (ESTRACE) 0.1 MG/GM vaginal cream, , Disp: , Rfl:      guaiFENesin-codeine (GUAIFENESIN AC) 100-10 MG/5ML SYRP syrup, Take 5 mLs by mouth, Disp: , Rfl:      HYDROcodone-acetaminophen (NORCO) 5-325 MG per tablet, Take 1 tablet by mouth every 6 hours as needed for pain Maximum of 4000 mg of acetaminophen in 24 hours., Disp: 10 tablet, Rfl: 0     lisinopril (ZESTRIL) 20 MG tablet, Take 20 mg by mouth, Disp: , Rfl:      metFORMIN (GLUCOPHAGE) 500 MG tablet, Take 500 mg by mouth, Disp: , Rfl:      metroNIDAZOLE (METROCREAM) 0.75 % cream, Apply topically daily , Disp: , Rfl:      Multiple Vitamins-Minerals (MULTIVITAMIN ADULT) CHEW, Take 2 tablets by mouth every morning , Disp: , Rfl:      omeprazole (PRILOSEC) 20 MG DR capsule, Take 20 mg by  "mouth, Disp: , Rfl:      oxyCODONE (ROXICODONE) 5 MG IR tablet, Take 5 mg by mouth every 6 hours as needed , Disp: , Rfl:      phentermine (ADIPEX-P) 37.5 MG tablet, Take 0.5-1 tablets (18.75-37.5 mg) by mouth every morning (before breakfast) for 90 days, Disp: 90 tablet, Rfl: 1     polyethylene glycol (MIRALAX/GLYCOLAX) powder, Take 17 g by mouth every morning , Disp: , Rfl:      prochlorperazine (COMPAZINE) 5 MG tablet, Take 5 mg by mouth, Disp: , Rfl:      traMADol (ULTRAM) 50 MG tablet, Take 1 tablet (50 mg) by mouth every 8 hours as needed for severe pain, Disp: 30 tablet, Rfl: 0     traMADol (ULTRAM) 50 MG tablet, Take 50 mg by mouth every 8 hours as needed , Disp: , Rfl:      vitamin D3 (CHOLECALCIFEROL) 125 MCG (5000 UT) tablet, Take 5,000 Units by mouth, Disp: , Rfl:    Surgical History     Past Surgical History  She has a past surgical history that includes Enhance Laser Refractive Bilateral Existing Pt In Parameters (Bilateral, 2001); appendectomy; Cholecystectomy; Abdomen surgery; wisdom teeth extraction; Head and neck surgery; Blepharoplasty bilateral (Bilateral, 7/19/2017); Cholecystectomy; appendectomy; Colonoscopy; Lasik; Orif Zygomatic Fracture; Laparotomy exploratory; Abdominal Adhesion Surgery; Blepharoplasty (07/2017); and Revision Paul-En-Y (12/8/2010).    Objective-Exam     Constitutional:  Ht 1.632 m (5' 4.25\")   Wt 90.3 kg (199 lb)   BMI 33.89 kg/m    General:  Pleasant and in no acute distress     Psychiatric: alert and oriented X3, mood and affect normal    Counseling     We reviewed the important post op bariatric recommendations:  -eating 3 meals daily  -eating protein first, getting >60gm protein daily  -eating slowly, chewing food well  -avoiding/limiting calorie containing beverages  -drinking water 15-30 minutes before or after meals  -choosing wheat, not white with breads, crackers, pastas, janey, bagels, tortillas, rice  -limiting restaurant or cafeteria eating to twice a week or " less    We discussed the importance of restorative sleep and stress management in maintaining a healthy weight.  We discussed the National Weight Control Registry healthy weight maintenance strategies and ways to optimize metabolism.  We discussed the importance of physical activity including cardiovascular and strength training in maintaining a healthier weight.    We discussed the importance of life-long vitamin supplementation and life-long  follow-up.    Corby was reminded that, to avoid marginal ulcers she should avoid tobacco at all, alcohol in excess, caffeine in excess, and NSAIDS (unless indicated for cardioprotection or othewise and opposed by a PPI).    Tamica Giraldo MD, MD, FAA[facility].  7/27/2022  10:50 AM  Total time spent on the date of this encounter doing: chart review, review of test results, patient visit, physical exam, education, counseling, developing plan of care, and documenting =  minutes.      Video-Visit Details    Type of service:  [id number] Visit    Video End Time (time video stopped): 11:10  Originating Location (pt. Location): Home    Distant Location (provider location):  [facility] [place]     Platform used for Video Visit: [employer]om[employer]

## 2022-07-27 NOTE — PATIENT INSTRUCTIONS
140/90 or lower is your blood pressure goal  Stay on a consistent sleep schedule  Make sure to eat protein first and eat 3 times a day  Hydrate the day before exertion, dress according to temperatures, mindful exertion

## 2022-09-11 ENCOUNTER — HEALTH MAINTENANCE LETTER (OUTPATIENT)
Age: 67
End: 2022-09-11

## 2022-10-26 ENCOUNTER — VIRTUAL VISIT (OUTPATIENT)
Dept: SURGERY | Facility: CLINIC | Age: 67
End: 2022-10-26
Payer: COMMERCIAL

## 2022-10-26 VITALS — BODY MASS INDEX: 33.29 KG/M2 | HEIGHT: 64 IN | WEIGHT: 195 LBS

## 2022-10-26 DIAGNOSIS — K91.2 POSTOPERATIVE MALABSORPTION: Primary | ICD-10-CM

## 2022-10-26 DIAGNOSIS — Z78.0 POST-MENOPAUSAL: ICD-10-CM

## 2022-10-26 PROCEDURE — 99214 OFFICE O/P EST MOD 30 MIN: CPT | Mod: TEL | Performed by: FAMILY MEDICINE

## 2022-10-26 NOTE — LETTER
"    10/26/2022         RE: Corby Bradley  [address] [place] [address]        Dear Colleague,    Thank you for referring your patient, Corby Bradley, to the [facility] [place]. Please see a copy of my visit note below.      The patient has been notified of following:     \"This telephone visit will be conducted via a call between you and your physician/provider. We have found that certain health care needs can be provided without the need for a physical exam.  This service lets us provide the care you need with a short phone conversation.  If a prescription is necessary we can send it directly to your pharmacy.  If lab work is needed we can place an order for that and you can then stop by our lab to have the test done at a later time.    Telephone visits are billed at different rates depending on your insurance coverage. During this emergency period, for some insurers they may be billed the same as an in-person visit.  Please reach out to your insurance provider with any questions.    If during the course of the call the physician/provider feels a telephone visit is not appropriate, you will not be charged for this service.\"    Patient has given verbal consent to a Telephone visit? Yes    What phone number would you like to be contacted at? [phone]     Patient would like to receive their AVS by [employer]    Are there any specific questions or needs that you would like addressed at your visit today? Return MWM - no concerns or ?s today    {PROVIDER LOCATION On-site should be selected for visits conducted from your clinic location or adjoining [facility] hospital, academic office, or other nearby [facility] building. Off-site should be selected for all other provider locations, including home:[id number]}    Distant Location (provider location):  On-site      Bariatric Follow Up Visit with a History of Previous Bariatric Surgery     Date of visit: " 10/26/2022  Physician: Tamica Giraldo MD, MD  Primary Care Provider:  Provider, Historical  Corby Bradley   67 year old  female    Date of Surgery: 12/8/2010  Initial Weight: 280#  Initial BMI:   Today's Weight:   Wt Readings from Last 1 Encounters:   10/26/22 88.5 kg (195 lb)     Body mass index is 33.21 kg/m .      Assessment and Plan     Assessment: Corby is a 67 year old year old female who is 12 yrs s/p  Paul en Y Gastric Bypass with Dr. Diana Tavarez ANKIT Bradley feels as if she has achieved the goals she hoped to accomplish through bariatric surgery and weight loss.    Encounter Diagnoses   Name Primary?     Postoperative malabsorption Yes     Post-menopausal          Current Outpatient Medications:      aspirin EC 81 MG EC tablet, Take 81 mg by mouth, Disp: , Rfl:      atorvastatin (LIPITOR) 40 MG tablet, Take 40 mg by mouth, Disp: , Rfl:      BIOTIN PO, Take 500 mcg by mouth daily , Disp: , Rfl:      Butalbital-APAP-Caffeine -40 MG CAPS, 1-2 tab PO 1-2 times per day PRN migraine, Disp: , Rfl:      butalbital-aspirin-caffeine (FIORINAL EQUIV) -40 MG TABS per tablet, Take 1-2 tablets by mouth 1-2 time daily PRN, Disp: , Rfl:      Calcium Carb-Cholecalciferol 600-800 MG-UNIT CHEW, Take 2 tablets by mouth, Disp: , Rfl:      calcium Citrate-vitamin D 500-400 MG-UNIT CHEW, Take 1 tablet by mouth, Disp: , Rfl:      carboxymethylcellulose (REFRESH PLUS) 0.5 % SOLN ophthalmic solution, 1 drop, Disp: , Rfl:      cyanocobalamin 1000 MCG SUBL sublingual tablet, Take 1 tablet every other day., Disp: , Rfl:      cyclobenzaprine (FLEXERIL) 10 MG tablet, Take 10 mg by mouth, Disp: , Rfl:      erythromycin (ROMYCIN) ophthalmic ointment, Apply small amount to incision sites three times daily until tube runs out., Disp: 3.5 g, Rfl: 0     estradiol (ESTRACE) 0.1 MG/GM vaginal cream, , Disp: , Rfl:      guaiFENesin-codeine (GUAIFENESIN AC) 100-10 MG/5ML SYRP syrup, Take 5 mLs by mouth, Disp: , Rfl:       "HYDROcodone-acetaminophen (NORCO) 5-325 MG per tablet, Take 1 tablet by mouth every 6 hours as needed for pain Maximum of 4000 mg of acetaminophen in 24 hours., Disp: 10 tablet, Rfl: 0     lisinopril (ZESTRIL) 20 MG tablet, Take 20 mg by mouth, Disp: , Rfl:      metFORMIN (GLUCOPHAGE) 500 MG tablet, Take 500 mg by mouth, Disp: , Rfl:      metroNIDAZOLE (METROCREAM) 0.75 % cream, Apply topically daily , Disp: , Rfl:      Multiple Vitamins-Minerals (MULTIVITAMIN ADULT) CHEW, Take 2 tablets by mouth every morning , Disp: , Rfl:      omeprazole (PRILOSEC) 20 MG DR capsule, Take 20 mg by mouth, Disp: , Rfl:      oxyCODONE (ROXICODONE) 5 MG IR tablet, Take 5 mg by mouth every 6 hours as needed , Disp: , Rfl:      polyethylene glycol (MIRALAX/GLYCOLAX) powder, Take 17 g by mouth every morning , Disp: , Rfl:      prochlorperazine (COMPAZINE) 5 MG tablet, Take 5 mg by mouth, Disp: , Rfl:      traMADol (ULTRAM) 50 MG tablet, Take 50 mg by mouth every 8 hours as needed , Disp: , Rfl:      vitamin D3 (CHOLECALCIFEROL) 125 MCG (5000 UT) tablet, Take 5,000 Units by mouth, Disp: , Rfl:     Plan:DEXA, continue phentermine 1/2 tab in the am. Dietitian prn. Yearly follow up. Continue vitamins as is.    Return in about 6 months (around 4/26/2023). if needing phentermine refill-1 yr annual f/u otherwise    Bariatric Surgery Review     Interim History/LifeChanges: maintaining a 85# weight loss. Having a new    Patient Concerns: Doing well  Appetite (1-10): down on phentermine  GERD: on PPI    Medication changes: phentermine 1/2 tab in the am    Vitamin Intake:   B-12   SL   MVI  2/d   Vitamin D  5,000   Calcium   citrate     Other                LABS: \"Reviewed    Nausea no  Vomiting no  Constipation on stool softener  Diarrhea no  Rashes no  Hair Loss no  Calf tenderness no  Breathing difficulty no  Reactive Hypoglycemia avoids  Light Headedness no   Moods euthymic    12 point ROS as above and otherwise negative      Habits:  Alcohol: " no  Tobacco: no  Caffeine 1/2caf  NSAIDS no  Exercise Routine: walking  3 meals/day yes  Protein first yes  60grams/day  Water Separate from meals yes  Calorie Containing Beverages no  Restaurant eating/wk <2/wk  Sleeping well  Stress low  CPAP: consistent  Contraception: PM  DEXA:ordered    Social History     Social History     Socioeconomic History     Marital status:      Spouse name: Not on file     Number of children: Not on file     Years of education: Not on file     Highest education level: Not on file   Occupational History     Not on file   Tobacco Use     Smoking status: Former     Smokeless tobacco: Never     Tobacco comments:     Quit in 1986   Substance and Sexual Activity     Alcohol use: Yes     Alcohol/week: 0.0 standard drinks     Comment: Alcoholic Drinks/day: Rarely     Drug use: No     Sexual activity: Yes     Partners: Male     Birth control/protection: Other   Other Topics Concern     Not on file   Social History Narrative    . Semi-retired. Has Masters in [employer] and worked at [employer]. 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 Has a daughter and a grandson.    She speaks Occitan, Azeri, English, and some Spanish. Her father worked for [employer].     Social Determinants of Health     Financial Resource Strain: Not on file   Food Insecurity: Not on file   Transportation Needs: Not on file   Physical Activity: Not on file   Stress: Not on file   Social Connections: Not on file   Intimate Partner Violence: Not on file   Housing Stability: Not on file       Past Medical History     Past Medical History:   Diagnosis Date     Back pain      Controlled type 2 diabetes mellitus without complication, without long-term current use of insulin (H) 01/01/2018     Fibromyalgia      Fibromyalgia, primary      History of anesthesia complications     difficult to wake up/bad headache once     History of Paul-en-Y gastric bypass 01/01/2010     Hyperlipemia       Hypertension      Iron deficiency      Knee pain      Lactose intolerance      Migraine      Morbid obesity (H)      Noninfectious ileitis      Postoperative malabsorption      Sleep apnea      Urinary incontinence      Problem List     Patient Active Problem List   Diagnosis     Fibromyalgia, primary     Back pain     Knee pain     Migraine     Obesity (BMI 30-39.9)     History of Paul-en-Y gastric bypass     Postoperative malabsorption     Controlled type 2 diabetes mellitus without complication, without long-term current use of insulin (H)     Hypertension     Hypertriglyceridemia     Irritable bowel syndrome     Lactose intolerance     Lumbar disc disease     Pure hypercholesterolemia     Rosacea     Sleep apnea     Status post gastric bypass for obesity     Morbid obesity (H)     Medications       Current Outpatient Medications:      aspirin EC 81 MG EC tablet, Take 81 mg by mouth, Disp: , Rfl:      atorvastatin (LIPITOR) 40 MG tablet, Take 40 mg by mouth, Disp: , Rfl:      BIOTIN PO, Take 500 mcg by mouth daily , Disp: , Rfl:      Butalbital-APAP-Caffeine -40 MG CAPS, 1-2 tab PO 1-2 times per day PRN migraine, Disp: , Rfl:      butalbital-aspirin-caffeine (FIORINAL EQUIV) -40 MG TABS per tablet, Take 1-2 tablets by mouth 1-2 time daily PRN, Disp: , Rfl:      Calcium Carb-Cholecalciferol 600-800 MG-UNIT CHEW, Take 2 tablets by mouth, Disp: , Rfl:      calcium Citrate-vitamin D 500-400 MG-UNIT CHEW, Take 1 tablet by mouth, Disp: , Rfl:      carboxymethylcellulose (REFRESH PLUS) 0.5 % SOLN ophthalmic solution, 1 drop, Disp: , Rfl:      cyanocobalamin 1000 MCG SUBL sublingual tablet, Take 1 tablet every other day., Disp: , Rfl:      cyclobenzaprine (FLEXERIL) 10 MG tablet, Take 10 mg by mouth, Disp: , Rfl:      erythromycin (ROMYCIN) ophthalmic ointment, Apply small amount to incision sites three times daily until tube runs out., Disp: 3.5 g, Rfl: 0     estradiol (ESTRACE) 0.1 MG/GM vaginal cream,  ", Disp: , Rfl:      guaiFENesin-codeine (GUAIFENESIN AC) 100-10 MG/5ML SYRP syrup, Take 5 mLs by mouth, Disp: , Rfl:      HYDROcodone-acetaminophen (NORCO) 5-325 MG per tablet, Take 1 tablet by mouth every 6 hours as needed for pain Maximum of 4000 mg of acetaminophen in 24 hours., Disp: 10 tablet, Rfl: 0     lisinopril (ZESTRIL) 20 MG tablet, Take 20 mg by mouth, Disp: , Rfl:      metFORMIN (GLUCOPHAGE) 500 MG tablet, Take 500 mg by mouth, Disp: , Rfl:      metroNIDAZOLE (METROCREAM) 0.75 % cream, Apply topically daily , Disp: , Rfl:      Multiple Vitamins-Minerals (MULTIVITAMIN ADULT) CHEW, Take 2 tablets by mouth every morning , Disp: , Rfl:      omeprazole (PRILOSEC) 20 MG DR capsule, Take 20 mg by mouth, Disp: , Rfl:      oxyCODONE (ROXICODONE) 5 MG IR tablet, Take 5 mg by mouth every 6 hours as needed , Disp: , Rfl:      polyethylene glycol (MIRALAX/GLYCOLAX) powder, Take 17 g by mouth every morning , Disp: , Rfl:      prochlorperazine (COMPAZINE) 5 MG tablet, Take 5 mg by mouth, Disp: , Rfl:      traMADol (ULTRAM) 50 MG tablet, Take 50 mg by mouth every 8 hours as needed , Disp: , Rfl:      vitamin D3 (CHOLECALCIFEROL) 125 MCG (5000 UT) tablet, Take 5,000 Units by mouth, Disp: , Rfl:    Surgical History     Past Surgical History  She has a past surgical history that includes Enhance Laser Refractive Bilateral Existing Pt In Parameters (Bilateral, 2001); appendectomy; Cholecystectomy; Abdomen surgery; wisdom teeth extraction; Head and neck surgery; Blepharoplasty bilateral (Bilateral, 7/19/2017); Cholecystectomy; appendectomy; Colonoscopy; Lasik; Orif Zygomatic Fracture; Laparotomy exploratory; Abdominal Adhesion Surgery; Blepharoplasty (07/2017); and Revision Paul-En-Y (12/8/2010).    Objective-Exam     Constitutional:  Ht 1.632 m (5' 4.25\")   Wt 88.5 kg (195 lb)   BMI 33.21 kg/m    General:  Pleasant and in no acute distress     Psychiatric: alert and oriented X3, mood and affect normal    Counseling "     We reviewed the important post op bariatric recommendations:  -eating 3 meals daily  -eating protein first, getting >60gm protein daily  -eating slowly, chewing food well  -avoiding/limiting calorie containing beverages  -drinking water 15-30 minutes before or after meals  -choosing wheat, not white with breads, crackers, pastas, janey, bagels, tortillas, rice  -limiting restaurant or cafeteria eating to twice a week or less    We discussed the importance of restorative sleep and stress management in maintaining a healthy weight.  We discussed the National Weight Control Registry healthy weight maintenance strategies and ways to optimize metabolism.  We discussed the importance of physical activity including cardiovascular and strength training in maintaining a healthier weight.    We discussed the importance of life-long vitamin supplementation and life-long  follow-up.    Corby was reminded that, to avoid marginal ulcers she should avoid tobacco at all, alcohol in excess, caffeine in excess, and NSAIDS (unless indicated for cardioprotection or othewise and opposed by a PPI).    Tamica Giraldo MD, MD, [facility].  10/26/2022  1:08 PM  Total time spent on the date of this encounter doing: chart review, review of test results, patient visit, physical exam, education, counseling, developing plan of care, and documenting = 30 minutes.    Phone call duration: 30 minutes        Again, thank you for allowing me to participate in the care of your patient.        Sincerely,        Tamica Giraldo MD

## 2022-10-26 NOTE — PROGRESS NOTES
"  The patient has been notified of following:     \"This telephone visit will be conducted via a call between you and your physician/provider. We have found that certain health care needs can be provided without the need for a physical exam.  This service lets us provide the care you need with a short phone conversation.  If a prescription is necessary we can send it directly to your pharmacy.  If lab work is needed we can place an order for that and you can then stop by our lab to have the test done at a later time.    Telephone visits are billed at different rates depending on your insurance coverage. During this emergency period, for some insurers they may be billed the same as an in-person visit.  Please reach out to your insurance provider with any questions.    If during the course of the call the physician/provider feels a telephone visit is not appropriate, you will not be charged for this service.\"    Patient has given verbal consent to a Telephone visit? Yes    What phone number would you like to be contacted at? [phone]     Patient would like to receive their AVS by [employer]    Are there any specific questions or needs that you would like addressed at your visit today? Return MWM - no concerns or ?s today        Distant Location (provider location):  On-site      Bariatric Follow Up Visit with a History of Previous Bariatric Surgery     Date of visit: 10/26/2022  Physician: Tamica Giraldo MD, MD  Primary Care Provider:  Provider, Gaby  Corby Bradley   67 year old  female    Date of Surgery: 12/8/2010  Initial Weight: 280#  Initial BMI:   Today's Weight:   Wt Readings from Last 1 Encounters:   10/26/22 88.5 kg (195 lb)     Body mass index is 33.21 kg/m .      Assessment and Plan     Assessment: Corby is a 67 year old year old female who is 12 yrs s/p  Paul en Y Gastric Bypass with Dr. Diana Tavarez ANKIT Gagandeepwiliam feels as if she has achieved the goals she hoped to accomplish through " bariatric surgery and weight loss.    Encounter Diagnoses   Name Primary?     Postoperative malabsorption Yes     Post-menopausal          Current Outpatient Medications:      aspirin EC 81 MG EC tablet, Take 81 mg by mouth, Disp: , Rfl:      atorvastatin (LIPITOR) 40 MG tablet, Take 40 mg by mouth, Disp: , Rfl:      BIOTIN PO, Take 500 mcg by mouth daily , Disp: , Rfl:      Butalbital-APAP-Caffeine -40 MG CAPS, 1-2 tab PO 1-2 times per day PRN migraine, Disp: , Rfl:      butalbital-aspirin-caffeine (FIORINAL EQUIV) -40 MG TABS per tablet, Take 1-2 tablets by mouth 1-2 time daily PRN, Disp: , Rfl:      Calcium Carb-Cholecalciferol 600-800 MG-UNIT CHEW, Take 2 tablets by mouth, Disp: , Rfl:      calcium Citrate-vitamin D 500-400 MG-UNIT CHEW, Take 1 tablet by mouth, Disp: , Rfl:      carboxymethylcellulose (REFRESH PLUS) 0.5 % SOLN ophthalmic solution, 1 drop, Disp: , Rfl:      cyanocobalamin 1000 MCG SUBL sublingual tablet, Take 1 tablet every other day., Disp: , Rfl:      cyclobenzaprine (FLEXERIL) 10 MG tablet, Take 10 mg by mouth, Disp: , Rfl:      erythromycin (ROMYCIN) ophthalmic ointment, Apply small amount to incision sites three times daily until tube runs out., Disp: 3.5 g, Rfl: 0     estradiol (ESTRACE) 0.1 MG/GM vaginal cream, , Disp: , Rfl:      guaiFENesin-codeine (GUAIFENESIN AC) 100-10 MG/5ML SYRP syrup, Take 5 mLs by mouth, Disp: , Rfl:      HYDROcodone-acetaminophen (NORCO) 5-325 MG per tablet, Take 1 tablet by mouth every 6 hours as needed for pain Maximum of 4000 mg of acetaminophen in 24 hours., Disp: 10 tablet, Rfl: 0     lisinopril (ZESTRIL) 20 MG tablet, Take 20 mg by mouth, Disp: , Rfl:      metFORMIN (GLUCOPHAGE) 500 MG tablet, Take 500 mg by mouth, Disp: , Rfl:      metroNIDAZOLE (METROCREAM) 0.75 % cream, Apply topically daily , Disp: , Rfl:      Multiple Vitamins-Minerals (MULTIVITAMIN ADULT) CHEW, Take 2 tablets by mouth every morning , Disp: , Rfl:      omeprazole (PRILOSEC)  "20 MG DR capsule, Take 20 mg by mouth, Disp: , Rfl:      oxyCODONE (ROXICODONE) 5 MG IR tablet, Take 5 mg by mouth every 6 hours as needed , Disp: , Rfl:      polyethylene glycol (MIRALAX/GLYCOLAX) powder, Take 17 g by mouth every morning , Disp: , Rfl:      prochlorperazine (COMPAZINE) 5 MG tablet, Take 5 mg by mouth, Disp: , Rfl:      traMADol (ULTRAM) 50 MG tablet, Take 50 mg by mouth every 8 hours as needed , Disp: , Rfl:      vitamin D3 (CHOLECALCIFEROL) 125 MCG (5000 UT) tablet, Take 5,000 Units by mouth, Disp: , Rfl:     Plan:DEXA, continue phentermine 1/2 tab in the am. Dietitian prn. Yearly follow up. Continue vitamins as is.    Return in about 6 months (around 4/26/2023). if needing phentermine refill-1 yr annual f/u otherwise    Bariatric Surgery Review     Interim History/LifeChanges: maintaining a 85# weight loss. Having a new    Patient Concerns: Doing well  Appetite (1-10): down on phentermine  GERD: on PPI    Medication changes: phentermine 1/2 tab in the am    Vitamin Intake:   B-12   SL   MVI  2/d   Vitamin D  5,000   Calcium   citrate     Other                LABS: \"Reviewed    Nausea no  Vomiting no  Constipation on stool softener  Diarrhea no  Rashes no  Hair Loss no  Calf tenderness no  Breathing difficulty no  Reactive Hypoglycemia avoids  Light Headedness no   Moods euthymic    12 point ROS as above and otherwise negative      Habits:  Alcohol: no  Tobacco: no  Caffeine 1/2caf  NSAIDS no  Exercise Routine: walking  3 meals/day yes  Protein first yes  60grams/day  Water Separate from meals yes  Calorie Containing Beverages no  Restaurant eating/wk <2/wk  Sleeping well  Stress low  CPAP: consistent  Contraception: PM  DEXA:ordered    Social History     Social History     Socioeconomic History     Marital status:      Spouse name: Not on file     Number of children: Not on file     Years of education: Not on file     Highest education level: Not on file   Occupational History     Not on " file   Tobacco Use     Smoking status: Former     Smokeless tobacco: Never     Tobacco comments:     Quit in 1986   Substance and Sexual Activity     Alcohol use: Yes     Alcohol/week: 0.0 standard drinks     Comment: Alcoholic Drinks/day: Rarely     Drug use: No     Sexual activity: Yes     Partners: Male     Birth control/protection: Other   Other Topics Concern     Not on file   Social History Narrative    . Semi-retired. Has Masters in International Business and worked at [employer]. 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 Has a daughter and a grandson.    She speaks Jordanian, Urdu, Mohawk, and some Divehi. Her father worked for [employer].     Social Determinants of Health     Financial Resource Strain: Not on file   Food Insecurity: Not on file   Transportation Needs: Not on file   Physical Activity: Not on file   Stress: Not on file   Social Connections: Not on file   Intimate Partner Violence: Not on file   Housing Stability: Not on file       Past Medical History     Past Medical History:   Diagnosis Date     Back pain      Controlled type 2 diabetes mellitus without complication, without long-term current use of insulin (H) 01/01/2018     Fibromyalgia      Fibromyalgia, primary      History of anesthesia complications     difficult to wake up/bad headache once     History of Paul-en-Y gastric bypass 01/01/2010     Hyperlipemia      Hypertension      Iron deficiency      Knee pain      Lactose intolerance      Migraine      Morbid obesity (H)      Noninfectious ileitis      Postoperative malabsorption      Sleep apnea      Urinary incontinence      Problem List     Patient Active Problem List   Diagnosis     Fibromyalgia, primary     Back pain     Knee pain     Migraine     Obesity (BMI 30-39.9)     History of Paul-en-Y gastric bypass     Postoperative malabsorption     Controlled type 2 diabetes mellitus without complication, without long-term current use of insulin (H)      Hypertension     Hypertriglyceridemia     Irritable bowel syndrome     Lactose intolerance     Lumbar disc disease     Pure hypercholesterolemia     Rosacea     Sleep apnea     Status post gastric bypass for obesity     Morbid obesity (H)     Medications       Current Outpatient Medications:      aspirin EC 81 MG EC tablet, Take 81 mg by mouth, Disp: , Rfl:      atorvastatin (LIPITOR) 40 MG tablet, Take 40 mg by mouth, Disp: , Rfl:      BIOTIN PO, Take 500 mcg by mouth daily , Disp: , Rfl:      Butalbital-APAP-Caffeine -40 MG CAPS, 1-2 tab PO 1-2 times per day PRN migraine, Disp: , Rfl:      butalbital-aspirin-caffeine (FIORINAL EQUIV) -40 MG TABS per tablet, Take 1-2 tablets by mouth 1-2 time daily PRN, Disp: , Rfl:      Calcium Carb-Cholecalciferol 600-800 MG-UNIT CHEW, Take 2 tablets by mouth, Disp: , Rfl:      calcium Citrate-vitamin D 500-400 MG-UNIT CHEW, Take 1 tablet by mouth, Disp: , Rfl:      carboxymethylcellulose (REFRESH PLUS) 0.5 % SOLN ophthalmic solution, 1 drop, Disp: , Rfl:      cyanocobalamin 1000 MCG SUBL sublingual tablet, Take 1 tablet every other day., Disp: , Rfl:      cyclobenzaprine (FLEXERIL) 10 MG tablet, Take 10 mg by mouth, Disp: , Rfl:      erythromycin (ROMYCIN) ophthalmic ointment, Apply small amount to incision sites three times daily until tube runs out., Disp: 3.5 g, Rfl: 0     estradiol (ESTRACE) 0.1 MG/GM vaginal cream, , Disp: , Rfl:      guaiFENesin-codeine (GUAIFENESIN AC) 100-10 MG/5ML SYRP syrup, Take 5 mLs by mouth, Disp: , Rfl:      HYDROcodone-acetaminophen (NORCO) 5-325 MG per tablet, Take 1 tablet by mouth every 6 hours as needed for pain Maximum of 4000 mg of acetaminophen in 24 hours., Disp: 10 tablet, Rfl: 0     lisinopril (ZESTRIL) 20 MG tablet, Take 20 mg by mouth, Disp: , Rfl:      metFORMIN (GLUCOPHAGE) 500 MG tablet, Take 500 mg by mouth, Disp: , Rfl:      metroNIDAZOLE (METROCREAM) 0.75 % cream, Apply topically daily , Disp: , Rfl:      Multiple  "Vitamins-Minerals (MULTIVITAMIN ADULT) CHEW, Take 2 tablets by mouth every morning , Disp: , Rfl:      omeprazole (PRILOSEC) 20 MG DR capsule, Take 20 mg by mouth, Disp: , Rfl:      oxyCODONE (ROXICODONE) 5 MG IR tablet, Take 5 mg by mouth every 6 hours as needed , Disp: , Rfl:      polyethylene glycol (MIRALAX/GLYCOLAX) powder, Take 17 g by mouth every morning , Disp: , Rfl:      prochlorperazine (COMPAZINE) 5 MG tablet, Take 5 mg by mouth, Disp: , Rfl:      traMADol (ULTRAM) 50 MG tablet, Take 50 mg by mouth every 8 hours as needed , Disp: , Rfl:      vitamin D3 (CHOLECALCIFEROL) 125 MCG (5000 UT) tablet, Take 5,000 Units by mouth, Disp: , Rfl:    Surgical History     Past Surgical History  She has a past surgical history that includes Enhance Laser Refractive Bilateral Existing Pt In Parameters (Bilateral, 2001); appendectomy; Cholecystectomy; Abdomen surgery; wisdom teeth extraction; Head and neck surgery; Blepharoplasty bilateral (Bilateral, 7/19/2017); Cholecystectomy; appendectomy; Colonoscopy; Lasik; Orif Zygomatic Fracture; Laparotomy exploratory; Abdominal Adhesion Surgery; Blepharoplasty (07/2017); and Revision Paul-En-Y (12/8/2010).    Objective-Exam     Constitutional:  Ht 1.632 m (5' 4.25\")   Wt 88.5 kg (195 lb)   BMI 33.21 kg/m    General:  Pleasant and in no acute distress     Psychiatric: alert and oriented X3, mood and affect normal    Counseling     We reviewed the important post op bariatric recommendations:  -eating 3 meals daily  -eating protein first, getting >60gm protein daily  -eating slowly, chewing food well  -avoiding/limiting calorie containing beverages  -drinking water 15-30 minutes before or after meals  -choosing wheat, not white with breads, crackers, pastas, janey, bagels, tortillas, rice  -limiting restaurant or cafeteria eating to twice a week or less    We discussed the importance of restorative sleep and stress management in maintaining a healthy weight.  We discussed the " National Weight Control Registry healthy weight maintenance strategies and ways to optimize metabolism.  We discussed the importance of physical activity including cardiovascular and strength training in maintaining a healthier weight.    We discussed the importance of life-long vitamin supplementation and life-long  follow-up.    Corby was reminded that, to avoid marginal ulcers she should avoid tobacco at all, alcohol in excess, caffeine in excess, and NSAIDS (unless indicated for cardioprotection or othewise and opposed by a PPI).    Tamica Giraldo MD, MD, [facility].  10/26/2022  1:08 PM  Total time spent on the date of this encounter doing: chart review, review of test results, patient visit, physical exam, education, counseling, developing plan of care, and documenting = 30 minutes.    Phone call duration: 30 minutes

## 2022-10-26 NOTE — PATIENT INSTRUCTIONS
Seated Exercises for Arms and Legs (can be done before or after surgery)  [email or web address]      Exercises after Weight Loss Surgery (strengthening, when no weight lifting restrictions)  [email or web address]     Call [phone] to schedule your bone density scan.

## 2022-11-16 ENCOUNTER — ANCILLARY PROCEDURE (OUTPATIENT)
Dept: BONE DENSITY | Facility: CLINIC | Age: 67
End: 2022-11-16
Attending: FAMILY MEDICINE
Payer: COMMERCIAL

## 2022-11-16 DIAGNOSIS — K91.2 POSTOPERATIVE MALABSORPTION: ICD-10-CM

## 2022-11-16 DIAGNOSIS — Z78.0 POST-MENOPAUSAL: ICD-10-CM

## 2022-11-16 DIAGNOSIS — Z78.0 POSTMENOPAUSAL STATUS: ICD-10-CM

## 2022-11-16 PROCEDURE — 77080 DXA BONE DENSITY AXIAL: CPT | Mod: TC | Performed by: RADIOLOGY

## 2023-01-02 PROBLEM — M85.80 LOW BONE MASS: Status: ACTIVE | Noted: 2023-01-02

## 2023-02-01 DIAGNOSIS — E66.9 OBESITY (BMI 30-39.9): ICD-10-CM

## 2023-02-01 RX ORDER — PHENTERMINE HYDROCHLORIDE 37.5 MG/1
TABLET ORAL
Qty: 90 TABLET | Refills: 1 | Status: SHIPPED | OUTPATIENT
Start: 2023-02-01 | End: 2023-07-28

## 2023-03-24 ENCOUNTER — VIRTUAL VISIT (OUTPATIENT)
Dept: SURGERY | Facility: CLINIC | Age: 68
End: 2023-03-24
Payer: COMMERCIAL

## 2023-03-24 VITALS — HEIGHT: 64 IN | WEIGHT: 189 LBS | BODY MASS INDEX: 32.27 KG/M2

## 2023-03-24 DIAGNOSIS — K91.2 POSTOPERATIVE MALABSORPTION: Primary | ICD-10-CM

## 2023-03-24 PROCEDURE — 99213 OFFICE O/P EST LOW 20 MIN: CPT | Mod: VID | Performed by: FAMILY MEDICINE

## 2023-03-24 NOTE — PROGRESS NOTES
"  The patient has been notified of following:     \"This telephone visit will be conducted via a call between you and your physician/provider. We have found that certain health care needs can be provided without the need for a physical exam.  This service lets us provide the care you need with a short phone conversation.  If a prescription is necessary we can send it directly to your pharmacy.  If lab work is needed we can place an order for that and you can then stop by our lab to have the test done at a later time.    Telephone visits are billed at different rates depending on your insurance coverage. During this emergency period, for some insurers they may be billed the same as an in-person visit.  Please reach out to your insurance provider with any questions.    If during the course of the call the physician/provider feels a telephone visit is not appropriate, you will not be charged for this service.\"    Patient has given verbal consent to a Telephone visit? Yes    What phone number would you like to be contacted at? [phone]    Patient would like to receive their AVS by [employer]    Are there any specific questions or needs that you would like addressed at your visit today? phentermine        Distant Location (provider location):  On-site    Additional provider notes:     Bariatric Follow Up Visit with a History of Previous Bariatric Surgery     Date of visit: 3/24/2023  Physician: Tamica Giraldo MD, MD  Primary Care Provider:  Provider, Gaby  Corby Bradley   67 year old  female    Date of Surgery: 12/8/2010  Initial Weight: 280  Initial BMI:   Today's Weight:   Wt Readings from Last 1 Encounters:   03/24/23 85.7 kg (189 lb)     Body mass index is 32.19 kg/m .      Assessment and Plan     Assessment: Corby is a 67 year old year old female who is 13 yrs s/p  Paul en Y Gastric Bypass with Dr. Diana Bradley feels as if she has achieved the goals she hoped to accomplish through " bariatric surgery and weight loss.    Encounter Diagnosis   Name Primary?     Postoperative malabsorption Yes       Current Outpatient Medications:      atorvastatin (LIPITOR) 40 MG tablet, Take 40 mg by mouth, Disp: , Rfl:      BIOTIN PO, Take 500 mcg by mouth daily , Disp: , Rfl:      Butalbital-APAP-Caffeine -40 MG CAPS, 1-2 tab PO 1-2 times per day PRN migraine, Disp: , Rfl:      Calcium Carb-Cholecalciferol 600-800 MG-UNIT CHEW, Take 2 tablets by mouth, Disp: , Rfl:      carboxymethylcellulose (REFRESH PLUS) 0.5 % SOLN ophthalmic solution, 1 drop, Disp: , Rfl:      cyanocobalamin 1000 MCG SUBL sublingual tablet, Take 1 tablet every other day., Disp: , Rfl:      cyclobenzaprine (FLEXERIL) 10 MG tablet, Take 10 mg by mouth, Disp: , Rfl:      erythromycin (ROMYCIN) ophthalmic ointment, Apply small amount to incision sites three times daily until tube runs out., Disp: 3.5 g, Rfl: 0     estradiol (ESTRACE) 0.1 MG/GM vaginal cream, , Disp: , Rfl:      lisinopril (ZESTRIL) 20 MG tablet, Take 20 mg by mouth, Disp: , Rfl:      metFORMIN (GLUCOPHAGE) 500 MG tablet, Take 500 mg by mouth, Disp: , Rfl:      metroNIDAZOLE (METROCREAM) 0.75 % cream, Apply topically daily , Disp: , Rfl:      Multiple Vitamins-Minerals (MULTIVITAMIN ADULT) CHEW, Take 2 tablets by mouth every morning , Disp: , Rfl:      omeprazole (PRILOSEC) 20 MG DR capsule, Take 20 mg by mouth, Disp: , Rfl:      phentermine (ADIPEX-P) 37.5 MG tablet, TAKE ONE-HALF TO ONE TABLET BY MOUTH EVERY MORNING BEFORE BREAKFAST, Disp: 90 tablet, Rfl: 1     polyethylene glycol (MIRALAX/GLYCOLAX) powder, Take 17 g by mouth every morning , Disp: , Rfl:      prochlorperazine (COMPAZINE) 5 MG tablet, Take 5 mg by mouth, Disp: , Rfl:      vitamin D3 (CHOLECALCIFEROL) 125 MCG (5000 UT) tablet, Take 5,000 Units by mouth, Disp: , Rfl:      traMADol (ULTRAM) 50 MG tablet, Take 50 mg by mouth every 8 hours as needed  (Patient not taking: Reported on 3/24/2023), Disp: , Rfl:  "    Plan: Annual follow ups with labs. Continue vitamins as is. OK to take magnesium.    Return in about 6 months (around 9/24/2023).    Bariatric Surgery Review     Interim History/LifeChanges: Doing well. Was in [place] 3 times this year. 2 grandsons. Walking with a friend 4 days a week.     Patient Concerns: doing well  Appetite (1-10): ok  GERD: PPI    Medication changes: none    Vitamin Intake:   B-12   SL   MVI  2/d   Vitamin D  5,000   Calcium   gummi     Other                LABS: \"Reviewed  excellent  Nausea no  Vomiting no  Constipation no  Diarrhea no  Rashes no  Hair Loss no  Calf tenderness no  Breathing difficulty no  Reactive Hypoglycemia avoids  Light Headedness no   Moods euthymic    12 point ROS as above and otherwise negative      Habits:  Alcohol: no  Tobacco: no  Caffeine   NSAIDS   Exercise Routine: walking 4X/wk  3 meals/day yes  Protein first yes  60 grams/day  Water Separate from meals yes  Calorie Containing Beverages no  Restaurant eating/wk <2  Sleeping well  Stress mod  CPAP: yes  Contraception: PM  DEXA:low bone mass    Social History     Social History     Socioeconomic History     Marital status:      Spouse name: Not on file     Number of children: Not on file     Years of education: Not on file     Highest education level: Not on file   Occupational History     Not on file   Tobacco Use     Smoking status: Former     Smokeless tobacco: Never     Tobacco comments:     Quit in 1986   Substance and Sexual Activity     Alcohol use: Yes     Alcohol/week: 0.0 standard drinks     Comment: Alcoholic Drinks/day: Rarely     Drug use: No     Sexual activity: Yes     Partners: Male     Birth control/protection: Other   Other Topics Concern     Not on file   Social History Narrative    . Semi-retired. Has Masters in International Business and worked at [employer]. 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 Has a daughter and  two grandsons.    She speaks " Lao, British, Rwandan, and some Tamazight. Her father worked for [employer].     Social Determinants of Health     Financial Resource Strain: Not on file   Food Insecurity: Not on file   Transportation Needs: Not on file   Physical Activity: Not on file   Stress: Not on file   Social Connections: Not on file   Intimate Partner Violence: Not on file   Housing Stability: Not on file       Past Medical History     Past Medical History:   Diagnosis Date     Back pain      Controlled type 2 diabetes mellitus without complication, without long-term current use of insulin (H) 01/01/2018     Fibromyalgia      Fibromyalgia, primary      History of anesthesia complications     difficult to wake up/bad headache once     History of Paul-en-Y gastric bypass 01/01/2010     Hyperlipemia      Hypertension      Iron deficiency      Knee pain      Lactose intolerance      Low bone mass 1/2/2023     Migraine      Morbid obesity (H)      Noninfectious ileitis      Postoperative malabsorption      Sleep apnea      Urinary incontinence      Problem List     Patient Active Problem List   Diagnosis     Fibromyalgia, primary     Back pain     Knee pain     Migraine     Obesity (BMI 30-39.9)     History of Paul-en-Y gastric bypass     Postoperative malabsorption     Controlled type 2 diabetes mellitus without complication, without long-term current use of insulin (H)     Hypertension     Hypertriglyceridemia     Irritable bowel syndrome     Lactose intolerance     Lumbar disc disease     Pure hypercholesterolemia     Rosacea     Sleep apnea     Status post gastric bypass for obesity     Morbid obesity (H)     Low bone mass     Medications       Current Outpatient Medications:      atorvastatin (LIPITOR) 40 MG tablet, Take 40 mg by mouth, Disp: , Rfl:      BIOTIN PO, Take 500 mcg by mouth daily , Disp: , Rfl:      Butalbital-APAP-Caffeine -40 MG CAPS, 1-2 tab PO 1-2 times per day PRN migraine, Disp: , Rfl:      Calcium Carb-Cholecalciferol  600-800 MG-UNIT CHEW, Take 2 tablets by mouth, Disp: , Rfl:      carboxymethylcellulose (REFRESH PLUS) 0.5 % SOLN ophthalmic solution, 1 drop, Disp: , Rfl:      cyanocobalamin 1000 MCG SUBL sublingual tablet, Take 1 tablet every other day., Disp: , Rfl:      cyclobenzaprine (FLEXERIL) 10 MG tablet, Take 10 mg by mouth, Disp: , Rfl:      erythromycin (ROMYCIN) ophthalmic ointment, Apply small amount to incision sites three times daily until tube runs out., Disp: 3.5 g, Rfl: 0     estradiol (ESTRACE) 0.1 MG/GM vaginal cream, , Disp: , Rfl:      lisinopril (ZESTRIL) 20 MG tablet, Take 20 mg by mouth, Disp: , Rfl:      metFORMIN (GLUCOPHAGE) 500 MG tablet, Take 500 mg by mouth, Disp: , Rfl:      metroNIDAZOLE (METROCREAM) 0.75 % cream, Apply topically daily , Disp: , Rfl:      Multiple Vitamins-Minerals (MULTIVITAMIN ADULT) CHEW, Take 2 tablets by mouth every morning , Disp: , Rfl:      omeprazole (PRILOSEC) 20 MG DR capsule, Take 20 mg by mouth, Disp: , Rfl:      phentermine (ADIPEX-P) 37.5 MG tablet, TAKE ONE-HALF TO ONE TABLET BY MOUTH EVERY MORNING BEFORE BREAKFAST, Disp: 90 tablet, Rfl: 1     polyethylene glycol (MIRALAX/GLYCOLAX) powder, Take 17 g by mouth every morning , Disp: , Rfl:      prochlorperazine (COMPAZINE) 5 MG tablet, Take 5 mg by mouth, Disp: , Rfl:      vitamin D3 (CHOLECALCIFEROL) 125 MCG (5000 UT) tablet, Take 5,000 Units by mouth, Disp: , Rfl:      traMADol (ULTRAM) 50 MG tablet, Take 50 mg by mouth every 8 hours as needed  (Patient not taking: Reported on 3/24/2023), Disp: , Rfl:    Surgical History     Past Surgical History  She has a past surgical history that includes Enhance Laser Refractive Bilateral Existing Pt In Parameters (Bilateral, 2001); appendectomy; Cholecystectomy; Abdomen surgery; wisdom teeth extraction; Head and neck surgery; Blepharoplasty bilateral (Bilateral, 7/19/2017); Cholecystectomy; appendectomy; Colonoscopy; Lasik; Orif Zygomatic Fracture; Laparotomy exploratory;  "Abdominal Adhesion Surgery; Blepharoplasty (07/2017); and Revision Paul-En-Y (12/8/2010).    Objective-Exam     Constitutional:  Ht 1.632 m (5' 4.25\")   Wt 85.7 kg (189 lb)   BMI 32.19 kg/m    General:  Pleasant and in no acute distress     Psychiatric: alert and oriented X3, mood and affect normal    Counseling     We reviewed the important post op bariatric recommendations:  -eating 3 meals daily  -eating protein first, getting >60gm protein daily  -eating slowly, chewing food well  -avoiding/limiting calorie containing beverages  -drinking water 15-30 minutes before or after meals  -choosing wheat, not white with breads, crackers, pastas, janey, bagels, tortillas, rice  -limiting restaurant or cafeteria eating to twice a week or less    We discussed the importance of restorative sleep and stress management in maintaining a healthy weight.  We discussed the National Weight Control Registry healthy weight maintenance strategies and ways to optimize metabolism.  We discussed the importance of physical activity including cardiovascular and strength training in maintaining a healthier weight.    We discussed the importance of life-long vitamin supplementation and life-long  follow-up.    Corby was reminded that, to avoid marginal ulcers she should avoid tobacco at all, alcohol in excess, caffeine in excess, and NSAIDS (unless indicated for cardioprotection or othewise and opposed by a PPI).    Tamica Giraldo MD, MD, [facility].  3/24/2023  1:44 PM  Total time spent on the date of this encounter doing: chart review, review of test results, patient visit, physical exam, education, counseling, developing plan of care, and documenting = 20 minutes.    Phone call duration: 20 minutes    "

## 2023-03-24 NOTE — LETTER
"    3/24/2023         RE: Corby Bradley  [address] [place] [address]        Dear Colleague,    Thank you for referring your patient, Corby Bradley, to the [facility] [place]. Please see a copy of my visit note below.      The patient has been notified of following:     \"This telephone visit will be conducted via a call between you and your physician/provider. We have found that certain health care needs can be provided without the need for a physical exam.  This service lets us provide the care you need with a short phone conversation.  If a prescription is necessary we can send it directly to your pharmacy.  If lab work is needed we can place an order for that and you can then stop by our lab to have the test done at a later time.    Telephone visits are billed at different rates depending on your insurance coverage. During this emergency period, for some insurers they may be billed the same as an in-person visit.  Please reach out to your insurance provider with any questions.    If during the course of the call the physician/provider feels a telephone visit is not appropriate, you will not be charged for this service.\"    Patient has given verbal consent to a Telephone visit? Yes    What phone number would you like to be contacted at? [phone]    Patient would like to receive their AVS by [employer]    Are there any specific questions or needs that you would like addressed at your visit today? phentermine    {PROVIDER LOCATION On-site should be selected for visits conducted from your clinic location or adjoining [facility] hospital, academic office, or other nearby [facility] building. Off-site should be selected for all other provider locations, including home:[id number]}    Distant Location (provider location):  On-site    Additional provider notes:     Bariatric Follow Up Visit with a History of Previous Bariatric Surgery     Date of visit: " 3/24/2023  Physician: Tamica Giraldo MD, MD  Primary Care Provider:  Provider, Historical  Corby Bradley   67 year old  female    Date of Surgery: 12/8/2010  Initial Weight: 280  Initial BMI:   Today's Weight:   Wt Readings from Last 1 Encounters:   03/24/23 85.7 kg (189 lb)     Body mass index is 32.19 kg/m .      Assessment and Plan     Assessment: Corby is a 67 year old year old female who is 13 yrs s/p  Paul en Y Gastric Bypass with Dr. Diana Tavarez ANKIT Bradley feels as if she has achieved the goals she hoped to accomplish through bariatric surgery and weight loss.    Encounter Diagnosis   Name Primary?     Postoperative malabsorption Yes       Current Outpatient Medications:      atorvastatin (LIPITOR) 40 MG tablet, Take 40 mg by mouth, Disp: , Rfl:      BIOTIN PO, Take 500 mcg by mouth daily , Disp: , Rfl:      Butalbital-APAP-Caffeine -40 MG CAPS, 1-2 tab PO 1-2 times per day PRN migraine, Disp: , Rfl:      Calcium Carb-Cholecalciferol 600-800 MG-UNIT CHEW, Take 2 tablets by mouth, Disp: , Rfl:      carboxymethylcellulose (REFRESH PLUS) 0.5 % SOLN ophthalmic solution, 1 drop, Disp: , Rfl:      cyanocobalamin 1000 MCG SUBL sublingual tablet, Take 1 tablet every other day., Disp: , Rfl:      cyclobenzaprine (FLEXERIL) 10 MG tablet, Take 10 mg by mouth, Disp: , Rfl:      erythromycin (ROMYCIN) ophthalmic ointment, Apply small amount to incision sites three times daily until tube runs out., Disp: 3.5 g, Rfl: 0     estradiol (ESTRACE) 0.1 MG/GM vaginal cream, , Disp: , Rfl:      lisinopril (ZESTRIL) 20 MG tablet, Take 20 mg by mouth, Disp: , Rfl:      metFORMIN (GLUCOPHAGE) 500 MG tablet, Take 500 mg by mouth, Disp: , Rfl:      metroNIDAZOLE (METROCREAM) 0.75 % cream, Apply topically daily , Disp: , Rfl:      Multiple Vitamins-Minerals (MULTIVITAMIN ADULT) CHEW, Take 2 tablets by mouth every morning , Disp: , Rfl:      omeprazole (PRILOSEC) 20 MG DR capsule, Take 20 mg by mouth, Disp: , Rfl:      " phentermine (ADIPEX-P) 37.5 MG tablet, TAKE ONE-HALF TO ONE TABLET BY MOUTH EVERY MORNING BEFORE BREAKFAST, Disp: 90 tablet, Rfl: 1     polyethylene glycol (MIRALAX/GLYCOLAX) powder, Take 17 g by mouth every morning , Disp: , Rfl:      prochlorperazine (COMPAZINE) 5 MG tablet, Take 5 mg by mouth, Disp: , Rfl:      vitamin D3 (CHOLECALCIFEROL) 125 MCG (5000 UT) tablet, Take 5,000 Units by mouth, Disp: , Rfl:      traMADol (ULTRAM) 50 MG tablet, Take 50 mg by mouth every 8 hours as needed  (Patient not taking: Reported on 3/24/2023), Disp: , Rfl:     Plan: Annual follow ups with labs. Continue vitamins as is. OK to take magnesium.    Return in about 6 months (around 9/24/2023).    Bariatric Surgery Review     Interim History/LifeChanges: Doing well. Was in [place] 3 times this year. 2 grandsons. Walking with a friend 4 days a week.     Patient Concerns: doing well  Appetite (1-10): ok  GERD: PPI    Medication changes: none    Vitamin Intake:   B-12   SL   MVI  2/d   Vitamin D  5,000   Calcium   gummi     Other                LABS: \"Reviewed  excellent  Nausea no  Vomiting no  Constipation no  Diarrhea no  Rashes no  Hair Loss no  Calf tenderness no  Breathing difficulty no  Reactive Hypoglycemia avoids  Light Headedness no   Moods euthymic    12 point ROS as above and otherwise negative      Habits:  Alcohol: no  Tobacco: no  Caffeine   NSAIDS   Exercise Routine: walking 4X/wk  3 meals/day yes  Protein first yes  60 grams/day  Water Separate from meals yes  Calorie Containing Beverages no  Restaurant eating/wk <2  Sleeping well  Stress mod  CPAP: yes  Contraception: PM  DEXA:low bone mass    Social History     Social History     Socioeconomic History     Marital status:      Spouse name: Not on file     Number of children: Not on file     Years of education: Not on file     Highest education level: Not on file   Occupational History     Not on file   Tobacco Use     Smoking status: Former     Smokeless " tobacco: Never     Tobacco comments:     Quit in 1986   Substance and Sexual Activity     Alcohol use: Yes     Alcohol/week: 0.0 standard drinks     Comment: Alcoholic Drinks/day: Rarely     Drug use: No     Sexual activity: Yes     Partners: Male     Birth control/protection: Other   Other Topics Concern     Not on file   Social History Narrative    . Semi-retired. 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 Has a daughter and  two grandsons.    She speaks Eritrean, Vietnamese, Kinyarwanda, and some Turkish. Her father worked for [employer].     Social Determinants of Health     Financial Resource Strain: Not on file   Food Insecurity: Not on file   Transportation Needs: Not on file   Physical Activity: Not on file   Stress: Not on file   Social Connections: Not on file   Intimate Partner Violence: Not on file   Housing Stability: Not on file       Past Medical History     Past Medical History:   Diagnosis Date     Back pain      Controlled type 2 diabetes mellitus without complication, without long-term current use of insulin (H) 01/01/2018     Fibromyalgia      Fibromyalgia, primary      History of anesthesia complications     difficult to wake up/bad headache once     History of Paul-en-Y gastric bypass 01/01/2010     Hyperlipemia      Hypertension      Iron deficiency      Knee pain      Lactose intolerance      Low bone mass 1/2/2023     Migraine      Morbid obesity (H)      Noninfectious ileitis      Postoperative malabsorption      Sleep apnea      Urinary incontinence      Problem List     Patient Active Problem List   Diagnosis     Fibromyalgia, primary     Back pain     Knee pain     Migraine     Obesity (BMI 30-39.9)     History of Paul-en-Y gastric bypass     Postoperative malabsorption     Controlled type 2 diabetes mellitus without complication, without long-term current use of insulin (H)     Hypertension      Hypertriglyceridemia     Irritable bowel syndrome     Lactose intolerance     Lumbar disc disease     Pure hypercholesterolemia     Rosacea     Sleep apnea     Status post gastric bypass for obesity     Morbid obesity (H)     Low bone mass     Medications       Current Outpatient Medications:      atorvastatin (LIPITOR) 40 MG tablet, Take 40 mg by mouth, Disp: , Rfl:      BIOTIN PO, Take 500 mcg by mouth daily , Disp: , Rfl:      Butalbital-APAP-Caffeine -40 MG CAPS, 1-2 tab PO 1-2 times per day PRN migraine, Disp: , Rfl:      Calcium Carb-Cholecalciferol 600-800 MG-UNIT CHEW, Take 2 tablets by mouth, Disp: , Rfl:      carboxymethylcellulose (REFRESH PLUS) 0.5 % SOLN ophthalmic solution, 1 drop, Disp: , Rfl:      cyanocobalamin 1000 MCG SUBL sublingual tablet, Take 1 tablet every other day., Disp: , Rfl:      cyclobenzaprine (FLEXERIL) 10 MG tablet, Take 10 mg by mouth, Disp: , Rfl:      erythromycin (ROMYCIN) ophthalmic ointment, Apply small amount to incision sites three times daily until tube runs out., Disp: 3.5 g, Rfl: 0     estradiol (ESTRACE) 0.1 MG/GM vaginal cream, , Disp: , Rfl:      lisinopril (ZESTRIL) 20 MG tablet, Take 20 mg by mouth, Disp: , Rfl:      metFORMIN (GLUCOPHAGE) 500 MG tablet, Take 500 mg by mouth, Disp: , Rfl:      metroNIDAZOLE (METROCREAM) 0.75 % cream, Apply topically daily , Disp: , Rfl:      Multiple Vitamins-Minerals (MULTIVITAMIN ADULT) CHEW, Take 2 tablets by mouth every morning , Disp: , Rfl:      omeprazole (PRILOSEC) 20 MG DR capsule, Take 20 mg by mouth, Disp: , Rfl:      phentermine (ADIPEX-P) 37.5 MG tablet, TAKE ONE-HALF TO ONE TABLET BY MOUTH EVERY MORNING BEFORE BREAKFAST, Disp: 90 tablet, Rfl: 1     polyethylene glycol (MIRALAX/GLYCOLAX) powder, Take 17 g by mouth every morning , Disp: , Rfl:      prochlorperazine (COMPAZINE) 5 MG tablet, Take 5 mg by mouth, Disp: , Rfl:      vitamin D3 (CHOLECALCIFEROL) 125 MCG (5000 UT) tablet, Take 5,000 Units by mouth, Disp: ,  "Rfl:      traMADol (ULTRAM) 50 MG tablet, Take 50 mg by mouth every 8 hours as needed  (Patient not taking: Reported on 3/24/2023), Disp: , Rfl:    Surgical History     Past Surgical History  She has a past surgical history that includes Enhance Laser Refractive Bilateral Existing Pt In Parameters (Bilateral, 2001); appendectomy; Cholecystectomy; Abdomen surgery; wisdom teeth extraction; Head and neck surgery; Blepharoplasty bilateral (Bilateral, 7/19/2017); Cholecystectomy; appendectomy; Colonoscopy; Lasik; Orif Zygomatic Fracture; Laparotomy exploratory; Abdominal Adhesion Surgery; Blepharoplasty (07/2017); and Revision Paul-En-Y (12/8/2010).    Objective-Exam     Constitutional:  Ht 1.632 m (5' 4.25\")   Wt 85.7 kg (189 lb)   BMI 32.19 kg/m    General:  Pleasant and in no acute distress     Psychiatric: alert and oriented X3, mood and affect normal    Counseling     We reviewed the important post op bariatric recommendations:  -eating 3 meals daily  -eating protein first, getting >60gm protein daily  -eating slowly, chewing food well  -avoiding/limiting calorie containing beverages  -drinking water 15-30 minutes before or after meals  -choosing wheat, not white with breads, crackers, pastas, janey, bagels, tortillas, rice  -limiting restaurant or cafeteria eating to twice a week or less    We discussed the importance of restorative sleep and stress management in maintaining a healthy weight.  We discussed the National Weight Control Registry healthy weight maintenance strategies and ways to optimize metabolism.  We discussed the importance of physical activity including cardiovascular and strength training in maintaining a healthier weight.    We discussed the importance of life-long vitamin supplementation and life-long  follow-up.    Corby was reminded that, to avoid marginal ulcers she should avoid tobacco at all, alcohol in excess, caffeine in excess, and NSAIDS (unless indicated for cardioprotection or " othewise and opposed by a PPI).    Tamica Giraldo MD, MD, [facility].  3/24/2023  1:44 PM  Total time spent on the date of this encounter doing: chart review, review of test results, patient visit, physical exam, education, counseling, developing plan of care, and documenting = 20 minutes.    Phone call duration: 20 minutes        Again, thank you for allowing me to participate in the care of your patient.        Sincerely,        Tamica Giraldo MD

## 2023-05-06 ENCOUNTER — HEALTH MAINTENANCE LETTER (OUTPATIENT)
Age: 68
End: 2023-05-06

## 2023-06-03 ENCOUNTER — HEALTH MAINTENANCE LETTER (OUTPATIENT)
Age: 68
End: 2023-06-03

## 2023-07-28 ENCOUNTER — VIRTUAL VISIT (OUTPATIENT)
Dept: SURGERY | Facility: CLINIC | Age: 68
End: 2023-07-28
Payer: COMMERCIAL

## 2023-07-28 VITALS — WEIGHT: 189 LBS | BODY MASS INDEX: 32.19 KG/M2

## 2023-07-28 DIAGNOSIS — K91.2 POSTOPERATIVE MALABSORPTION: Primary | ICD-10-CM

## 2023-07-28 DIAGNOSIS — E66.9 OBESITY (BMI 30-39.9): ICD-10-CM

## 2023-07-28 PROCEDURE — 99213 OFFICE O/P EST LOW 20 MIN: CPT | Mod: 93 | Performed by: FAMILY MEDICINE

## 2023-07-28 RX ORDER — PHENTERMINE HYDROCHLORIDE 37.5 MG/1
TABLET ORAL
Qty: 90 TABLET | Refills: 1 | Status: SHIPPED | OUTPATIENT
Start: 2023-07-28 | End: 2024-05-31

## 2023-07-28 NOTE — LETTER
"    7/28/2023         RE: Corby Bradley  [address] [place] [address]        Dear Colleague,    Thank you for referring your patient, Corby Bradley, to the [facility] [place]. Please see a copy of my visit note below.      The patient has been notified of following:     \"This telephone visit will be conducted via a call between you and your physician/provider. We have found that certain health care needs can be provided without the need for a physical exam.  This service lets us provide the care you need with a short phone conversation.  If a prescription is necessary we can send it directly to your pharmacy.  If lab work is needed we can place an order for that and you can then stop by our lab to have the test done at a later time.    Telephone visits are billed at different rates depending on your insurance coverage. During this emergency period, for some insurers they may be billed the same as an in-person visit.  Please reach out to your insurance provider with any questions.    If during the course of the call the physician/provider feels a telephone visit is not appropriate, you will not be charged for this service.\"    Patient has given verbal consent to a Telephone visit? Yes    What phone number would you like to be contacted at? [phone]    Patient would like to receive their AVS by [employer]    Are there any specific questions or needs that you would like addressed at your visit today? Phentermine refill       Bariatric Follow Up Visit with a History of Previous Bariatric Surgery     Date of visit: 7/28/2023  Physician: Tamica Giraldo MD, MD  Primary Care Provider:  Provider, Historical  Corby Bradley   68 year old  female    Date of Surgery: 12/8/2010  Initial Weight: 280  Initial BMI:   Today's Weight:   Wt Readings from Last 1 Encounters:   07/28/23 85.7 kg (189 lb)     Body mass index is 32.19 kg/m .      Assessment and " Plan     Assessment: Corby is a 68 year old year old female who is 12.5 yrs s/p  Paul en Y Gastric Bypass with Dr. Diana Bradley feels as if she has achieved the goals she hoped to accomplish through bariatric surgery and weight loss.    Encounter Diagnoses   Name Primary?     Postoperative malabsorption Yes     Obesity (BMI 30-39.9)          Current Outpatient Medications:      atorvastatin (LIPITOR) 40 MG tablet, Take 40 mg by mouth, Disp: , Rfl:      BIOTIN PO, Take 500 mcg by mouth daily , Disp: , Rfl:      Butalbital-APAP-Caffeine -40 MG CAPS, 1-2 tab PO 1-2 times per day PRN migraine, Disp: , Rfl:      Calcium Carb-Cholecalciferol 600-800 MG-UNIT CHEW, Take 2 tablets by mouth, Disp: , Rfl:      carboxymethylcellulose (REFRESH PLUS) 0.5 % SOLN ophthalmic solution, 1 drop, Disp: , Rfl:      cyanocobalamin 1000 MCG SUBL sublingual tablet, Take 1 tablet every other day., Disp: , Rfl:      cyclobenzaprine (FLEXERIL) 10 MG tablet, Take 10 mg by mouth, Disp: , Rfl:      erythromycin (ROMYCIN) ophthalmic ointment, Apply small amount to incision sites three times daily until tube runs out., Disp: 3.5 g, Rfl: 0     estradiol (ESTRACE) 0.1 MG/GM vaginal cream, , Disp: , Rfl:      lisinopril (ZESTRIL) 20 MG tablet, Take 20 mg by mouth, Disp: , Rfl:      metFORMIN (GLUCOPHAGE) 500 MG tablet, Take 500 mg by mouth, Disp: , Rfl:      metroNIDAZOLE (METROCREAM) 0.75 % cream, Apply topically daily , Disp: , Rfl:      Multiple Vitamins-Minerals (MULTIVITAMIN ADULT) CHEW, Take 2 tablets by mouth every morning , Disp: , Rfl:      omeprazole (PRILOSEC) 20 MG DR capsule, Take 20 mg by mouth, Disp: , Rfl:      phentermine (ADIPEX-P) 37.5 MG tablet, TAKE ONE-HALF TO ONE TABLET BY MOUTH EVERY MORNING BEFORE BREAKFAST, Disp: 90 tablet, Rfl: 1     polyethylene glycol (MIRALAX/GLYCOLAX) powder, Take 17 g by mouth every morning , Disp: , Rfl:      prochlorperazine (COMPAZINE) 5 MG tablet, Take 5 mg by mouth, Disp: , Rfl:  "     vitamin D3 (CHOLECALCIFEROL) 125 MCG (5000 UT) tablet, Take 5,000 Units by mouth, Disp: , Rfl:     Plan: Refill phentermine. Encouraged strength training. Continue vitamins as is. F/U in 6 mo. For annual visit with labs    No follow-ups on file.    Bariatric Surgery Review     Interim History/LifeChanges: Had hearing test, tinnitus escalated. Found out had hearing loss and is getting hearing aids. Walking Friend was diagnosed with prostate cancer then brain cancer caused by Agent Orange.     Patient Concerns: Refill  Appetite (1-10): OK  GERD: no. Takes Omeprazole    Medication changes: no    Vitamin Intake:   B-12   SL   MVI  2/d   Vitamin D  5,000   Calcium   citrate     Other                LABS: \"Reviewed  excellent  Nausea no  Vomiting no  Constipation yes  Diarrhea no  Rashes no  Hair Loss no  Calf tenderness no  Breathing difficulty no  Reactive Hypoglycemia avoids  Light Headedness no   Moods euthymic    12 point ROS as above and otherwise negative      Habits:  Alcohol: no  Tobacco: no  Caffeine coffee (cut back)  NSAIDS no  Exercise Routine: walking even around the house. 5-6K steps.Getting out grocery shopping now too. Yard work.   3 meals/day yes  Protein first yes  60grams/day  Water Separate from meals yes  Calorie Containing Beverages no  Restaurant eating/wk <2  Sleeping well  Stress OK  CPAP: NA  Contraception: PM  DEXA:Due 2024/2025    Social History     Social History     Socioeconomic History     Marital status:      Spouse name: Not on file     Number of children: Not on file     Years of education: Not on file     Highest education level: Not on file   Occupational History     Not on file   Tobacco Use     Smoking status: Former     Smokeless tobacco: Never     Tobacco comments:     Quit in 1986   Substance and Sexual Activity     Alcohol use: Yes     Alcohol/week: 0.0 standard drinks of alcohol     Comment: Alcoholic Drinks/day: Rarely     Drug use: No     Sexual activity: Yes     " Partners: Male     Birth control/protection: Other   Other Topics Concern     Not on file   Social History Narrative    . Semi-retired. Has Masters in International Business and worked at [employer]. 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 Has a daughter and  two grandsons.    She speaks Mauritanian, Vietnamese, Khmer, and some Emirati. Her father worked for [employer].     Social Determinants of Health     Financial Resource Strain: Not on file   Food Insecurity: Not on file   Transportation Needs: Not on file   Physical Activity: Not on file   Stress: Not on file   Social Connections: Not on file   Intimate Partner Violence: Not on file   Housing Stability: Not on file       Past Medical History     Past Medical History:   Diagnosis Date     Back pain      Controlled type 2 diabetes mellitus without complication, without long-term current use of insulin (H) 01/01/2018     Fibromyalgia      Fibromyalgia, primary      History of anesthesia complications     difficult to wake up/bad headache once     History of Paul-en-Y gastric bypass 01/01/2010     Hyperlipemia      Hypertension      Iron deficiency      Knee pain      Lactose intolerance      Low bone mass 1/2/2023     Migraine      Morbid obesity (H)      Noninfectious ileitis      Postoperative malabsorption      Sleep apnea      Urinary incontinence      Problem List     Patient Active Problem List   Diagnosis     Fibromyalgia, primary     Back pain     Knee pain     Migraine     Obesity (BMI 30-39.9)     History of Paul-en-Y gastric bypass     Postoperative malabsorption     Controlled type 2 diabetes mellitus without complication, without long-term current use of insulin (H)     Hypertension     Hypertriglyceridemia     Irritable bowel syndrome     Lactose intolerance     Lumbar disc disease     Pure hypercholesterolemia     Rosacea     Sleep apnea     Status post gastric bypass for obesity     Morbid obesity (H)     Low bone mass      Medications       Current Outpatient Medications:      atorvastatin (LIPITOR) 40 MG tablet, Take 40 mg by mouth, Disp: , Rfl:      BIOTIN PO, Take 500 mcg by mouth daily , Disp: , Rfl:      Butalbital-APAP-Caffeine -40 MG CAPS, 1-2 tab PO 1-2 times per day PRN migraine, Disp: , Rfl:      Calcium Carb-Cholecalciferol 600-800 MG-UNIT CHEW, Take 2 tablets by mouth, Disp: , Rfl:      carboxymethylcellulose (REFRESH PLUS) 0.5 % SOLN ophthalmic solution, 1 drop, Disp: , Rfl:      cyanocobalamin 1000 MCG SUBL sublingual tablet, Take 1 tablet every other day., Disp: , Rfl:      cyclobenzaprine (FLEXERIL) 10 MG tablet, Take 10 mg by mouth, Disp: , Rfl:      erythromycin (ROMYCIN) ophthalmic ointment, Apply small amount to incision sites three times daily until tube runs out., Disp: 3.5 g, Rfl: 0     estradiol (ESTRACE) 0.1 MG/GM vaginal cream, , Disp: , Rfl:      lisinopril (ZESTRIL) 20 MG tablet, Take 20 mg by mouth, Disp: , Rfl:      metFORMIN (GLUCOPHAGE) 500 MG tablet, Take 500 mg by mouth, Disp: , Rfl:      metroNIDAZOLE (METROCREAM) 0.75 % cream, Apply topically daily , Disp: , Rfl:      Multiple Vitamins-Minerals (MULTIVITAMIN ADULT) CHEW, Take 2 tablets by mouth every morning , Disp: , Rfl:      omeprazole (PRILOSEC) 20 MG DR capsule, Take 20 mg by mouth, Disp: , Rfl:      phentermine (ADIPEX-P) 37.5 MG tablet, TAKE ONE-HALF TO ONE TABLET BY MOUTH EVERY MORNING BEFORE BREAKFAST, Disp: 90 tablet, Rfl: 1     polyethylene glycol (MIRALAX/GLYCOLAX) powder, Take 17 g by mouth every morning , Disp: , Rfl:      prochlorperazine (COMPAZINE) 5 MG tablet, Take 5 mg by mouth, Disp: , Rfl:      vitamin D3 (CHOLECALCIFEROL) 125 MCG (5000 UT) tablet, Take 5,000 Units by mouth, Disp: , Rfl:    Surgical History     Past Surgical History  She has a past surgical history that includes Enhance Laser Refractive Bilateral Existing Pt In Parameters (Bilateral, 2001); appendectomy; Cholecystectomy; Abdomen surgery; wisdom teeth  extraction; Head and neck surgery; Blepharoplasty bilateral (Bilateral, 7/19/2017); Cholecystectomy; appendectomy; Colonoscopy; Lasik; Orif Zygomatic Fracture; Laparotomy exploratory; Abdominal Adhesion Surgery; Blepharoplasty (07/2017); and Revision Paul-En-Y (12/8/2010).    Objective-Exam     Constitutional:  Wt 85.7 kg (189 lb)   BMI 32.19 kg/m    General:  Pleasant and in no acute distress     Psychiatric: alert and oriented X3, mood and affect normal    Counseling     We reviewed the important post op bariatric recommendations:  -eating 3 meals daily  -eating protein first, getting >60gm protein daily  -eating slowly, chewing food well  -avoiding/limiting calorie containing beverages  -drinking water 15-30 minutes before or after meals  -choosing wheat, not white with breads, crackers, pastas, janey, bagels, tortillas, rice  -limiting restaurant or cafeteria eating to twice a week or less    We discussed the importance of restorative sleep and stress management in maintaining a healthy weight.  We discussed the National Weight Control Registry healthy weight maintenance strategies and ways to optimize metabolism.  We discussed the importance of physical activity including cardiovascular and strength training in maintaining a healthier weight.    We discussed the importance of life-long vitamin supplementation and life-long  follow-up.    Corby was reminded that, to avoid marginal ulcers she should avoid tobacco at all, alcohol in excess, caffeine in excess, and NSAIDS (unless indicated for cardioprotection or othewise and opposed by a PPI).    Tamica Giraldo MD, MD, [facility].  7/28/2023  3:08 PM  Total time spent on the date of this encounter doing: chart review, review of test results, patient visit, physical exam, education, counseling, developing plan of care, and documenting = 20 minutes.    Platform used for Telephone Visit: Telephone    Telephone Start Time:   3:05PM    Telephone End Time (time video stopped):  3:25PM    Originating Patient Location (pt. Location): Home    {PROVIDER LOCATION On-site should be selected for visits conducted from your clinic location or adjoining [facility] hospital, academic office, or other nearby [facility] building. Off-site should be selected for all other provider locations, including home:[id number]}  Distant Location (provider location):  On-site    Distant Location (provider location):  [facility] [place]                  Again, thank you for allowing me to participate in the care of your patient.        Sincerely,        Tamica Giraldo MD

## 2023-07-28 NOTE — PROGRESS NOTES
"  The patient has been notified of following:     \"This telephone visit will be conducted via a call between you and your physician/provider. We have found that certain health care needs can be provided without the need for a physical exam.  This service lets us provide the care you need with a short phone conversation.  If a prescription is necessary we can send it directly to your pharmacy.  If lab work is needed we can place an order for that and you can then stop by our lab to have the test done at a later time.    Telephone visits are billed at different rates depending on your insurance coverage. During this emergency period, for some insurers they may be billed the same as an in-person visit.  Please reach out to your insurance provider with any questions.    If during the course of the call the physician/provider feels a telephone visit is not appropriate, you will not be charged for this service.\"    Patient has given verbal consent to a Telephone visit? Yes    What phone number would you like to be contacted at? [phone]    Patient would like to receive their AVS by [employer]    Are there any specific questions or needs that you would like addressed at your visit today? Phentermine refill       Bariatric Follow Up Visit with a History of Previous Bariatric Surgery     Date of visit: 7/28/2023  Physician: Tamica Giraldo MD, MD  Primary Care Provider:  Provider, Gaby  Corby Bradley   68 year old  female    Date of Surgery: 12/8/2010  Initial Weight: 280  Initial BMI:   Today's Weight:   Wt Readings from Last 1 Encounters:   07/28/23 85.7 kg (189 lb)     Body mass index is 32.19 kg/m .      Assessment and Plan     Assessment: Corby is a 68 year old year old female who is 12.5 yrs s/p  Paul en Y Gastric Bypass with Dr. Diana Bradley feels as if she has achieved the goals she hoped to accomplish through bariatric surgery and weight loss.    Encounter Diagnoses   Name Primary?    " Postoperative malabsorption Yes    Obesity (BMI 30-39.9)          Current Outpatient Medications:     atorvastatin (LIPITOR) 40 MG tablet, Take 40 mg by mouth, Disp: , Rfl:     BIOTIN PO, Take 500 mcg by mouth daily , Disp: , Rfl:     Butalbital-APAP-Caffeine -40 MG CAPS, 1-2 tab PO 1-2 times per day PRN migraine, Disp: , Rfl:     Calcium Carb-Cholecalciferol 600-800 MG-UNIT CHEW, Take 2 tablets by mouth, Disp: , Rfl:     carboxymethylcellulose (REFRESH PLUS) 0.5 % SOLN ophthalmic solution, 1 drop, Disp: , Rfl:     cyanocobalamin 1000 MCG SUBL sublingual tablet, Take 1 tablet every other day., Disp: , Rfl:     cyclobenzaprine (FLEXERIL) 10 MG tablet, Take 10 mg by mouth, Disp: , Rfl:     erythromycin (ROMYCIN) ophthalmic ointment, Apply small amount to incision sites three times daily until tube runs out., Disp: 3.5 g, Rfl: 0    estradiol (ESTRACE) 0.1 MG/GM vaginal cream, , Disp: , Rfl:     lisinopril (ZESTRIL) 20 MG tablet, Take 20 mg by mouth, Disp: , Rfl:     metFORMIN (GLUCOPHAGE) 500 MG tablet, Take 500 mg by mouth, Disp: , Rfl:     metroNIDAZOLE (METROCREAM) 0.75 % cream, Apply topically daily , Disp: , Rfl:     Multiple Vitamins-Minerals (MULTIVITAMIN ADULT) CHEW, Take 2 tablets by mouth every morning , Disp: , Rfl:     omeprazole (PRILOSEC) 20 MG DR capsule, Take 20 mg by mouth, Disp: , Rfl:     phentermine (ADIPEX-P) 37.5 MG tablet, TAKE ONE-HALF TO ONE TABLET BY MOUTH EVERY MORNING BEFORE BREAKFAST, Disp: 90 tablet, Rfl: 1    polyethylene glycol (MIRALAX/GLYCOLAX) powder, Take 17 g by mouth every morning , Disp: , Rfl:     prochlorperazine (COMPAZINE) 5 MG tablet, Take 5 mg by mouth, Disp: , Rfl:     vitamin D3 (CHOLECALCIFEROL) 125 MCG (5000 UT) tablet, Take 5,000 Units by mouth, Disp: , Rfl:     Plan: Refill phentermine. Encouraged strength training. Continue vitamins as is. F/U in 6 mo. For annual visit with labs    No follow-ups on file.    Bariatric Surgery Review     Interim  "History/LifeChanges: Had hearing test, tinnitus escalated. Found out had hearing loss and is getting hearing aids. Walking Friend was diagnosed with prostate cancer then brain cancer caused by Agent Orange.     Patient Concerns: Refill  Appetite (1-10): OK  GERD: no. Takes Omeprazole    Medication changes: no    Vitamin Intake:   B-12   SL   MVI  2/d   Vitamin D  5,000   Calcium   citrate     Other                LABS: \"Reviewed  excellent  Nausea no  Vomiting no  Constipation yes  Diarrhea no  Rashes no  Hair Loss no  Calf tenderness no  Breathing difficulty no  Reactive Hypoglycemia avoids  Light Headedness no   Moods euthymic    12 point ROS as above and otherwise negative      Habits:  Alcohol: no  Tobacco: no  Caffeine coffee (cut back)  NSAIDS no  Exercise Routine: walking even around the house. 5-6K steps.Getting out grocery shopping now too. Yard work.   3 meals/day yes  Protein first yes  60grams/day  Water Separate from meals yes  Calorie Containing Beverages no  Restaurant eating/wk <2  Sleeping well  Stress OK  CPAP: NA  Contraception: PM  DEXA:Due 2024/2025    Social History     Social History     Socioeconomic History    Marital status:      Spouse name: Not on file    Number of children: Not on file    Years of education: Not on file    Highest education level: Not on file   Occupational History    Not on file   Tobacco Use    Smoking status: Former    Smokeless tobacco: Never    Tobacco comments:     Quit in 1986   Substance and Sexual Activity    Alcohol use: Yes     Alcohol/week: 0.0 standard drinks of alcohol     Comment: Alcoholic Drinks/day: Rarely    Drug use: No    Sexual activity: Yes     Partners: Male     Birth control/protection: Other   Other Topics Concern    Not on file   Social History Narrative    . Semi-retired. Has Masters in International Business and worked at [employer]. 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 Has a daughter and  two " grandsons.    She speaks Macanese, Kyrgyz, Estonian, and some Belarusian. Her father worked for [employer].     Social Determinants of Health     Financial Resource Strain: Not on file   Food Insecurity: Not on file   Transportation Needs: Not on file   Physical Activity: Not on file   Stress: Not on file   Social Connections: Not on file   Intimate Partner Violence: Not on file   Housing Stability: Not on file       Past Medical History     Past Medical History:   Diagnosis Date    Back pain     Controlled type 2 diabetes mellitus without complication, without long-term current use of insulin (H) 01/01/2018    Fibromyalgia     Fibromyalgia, primary     History of anesthesia complications     difficult to wake up/bad headache once    History of Paul-en-Y gastric bypass 01/01/2010    Hyperlipemia     Hypertension     Iron deficiency     Knee pain     Lactose intolerance     Low bone mass 1/2/2023    Migraine     Morbid obesity (H)     Noninfectious ileitis     Postoperative malabsorption     Sleep apnea     Urinary incontinence      Problem List     Patient Active Problem List   Diagnosis    Fibromyalgia, primary    Back pain    Knee pain    Migraine    Obesity (BMI 30-39.9)    History of Paul-en-Y gastric bypass    Postoperative malabsorption    Controlled type 2 diabetes mellitus without complication, without long-term current use of insulin (H)    Hypertension    Hypertriglyceridemia    Irritable bowel syndrome    Lactose intolerance    Lumbar disc disease    Pure hypercholesterolemia    Rosacea    Sleep apnea    Status post gastric bypass for obesity    Morbid obesity (H)    Low bone mass     Medications       Current Outpatient Medications:     atorvastatin (LIPITOR) 40 MG tablet, Take 40 mg by mouth, Disp: , Rfl:     BIOTIN PO, Take 500 mcg by mouth daily , Disp: , Rfl:     Butalbital-APAP-Caffeine -40 MG CAPS, 1-2 tab PO 1-2 times per day PRN migraine, Disp: , Rfl:     Calcium Carb-Cholecalciferol 600-800 MG-UNIT  CHEW, Take 2 tablets by mouth, Disp: , Rfl:     carboxymethylcellulose (REFRESH PLUS) 0.5 % SOLN ophthalmic solution, 1 drop, Disp: , Rfl:     cyanocobalamin 1000 MCG SUBL sublingual tablet, Take 1 tablet every other day., Disp: , Rfl:     cyclobenzaprine (FLEXERIL) 10 MG tablet, Take 10 mg by mouth, Disp: , Rfl:     erythromycin (ROMYCIN) ophthalmic ointment, Apply small amount to incision sites three times daily until tube runs out., Disp: 3.5 g, Rfl: 0    estradiol (ESTRACE) 0.1 MG/GM vaginal cream, , Disp: , Rfl:     lisinopril (ZESTRIL) 20 MG tablet, Take 20 mg by mouth, Disp: , Rfl:     metFORMIN (GLUCOPHAGE) 500 MG tablet, Take 500 mg by mouth, Disp: , Rfl:     metroNIDAZOLE (METROCREAM) 0.75 % cream, Apply topically daily , Disp: , Rfl:     Multiple Vitamins-Minerals (MULTIVITAMIN ADULT) CHEW, Take 2 tablets by mouth every morning , Disp: , Rfl:     omeprazole (PRILOSEC) 20 MG DR capsule, Take 20 mg by mouth, Disp: , Rfl:     phentermine (ADIPEX-P) 37.5 MG tablet, TAKE ONE-HALF TO ONE TABLET BY MOUTH EVERY MORNING BEFORE BREAKFAST, Disp: 90 tablet, Rfl: 1    polyethylene glycol (MIRALAX/GLYCOLAX) powder, Take 17 g by mouth every morning , Disp: , Rfl:     prochlorperazine (COMPAZINE) 5 MG tablet, Take 5 mg by mouth, Disp: , Rfl:     vitamin D3 (CHOLECALCIFEROL) 125 MCG (5000 UT) tablet, Take 5,000 Units by mouth, Disp: , Rfl:    Surgical History     Past Surgical History  She has a past surgical history that includes Enhance Laser Refractive Bilateral Existing Pt In Parameters (Bilateral, 2001); appendectomy; Cholecystectomy; Abdomen surgery; wisdom teeth extraction; Head and neck surgery; Blepharoplasty bilateral (Bilateral, 7/19/2017); Cholecystectomy; appendectomy; Colonoscopy; Lasik; Orif Zygomatic Fracture; Laparotomy exploratory; Abdominal Adhesion Surgery; Blepharoplasty (07/2017); and Revision Paul-En-Y (12/8/2010).    Objective-Exam     Constitutional:  Wt 85.7 kg (189 lb)   BMI 32.19 kg/m     General:  Pleasant and in no acute distress     Psychiatric: alert and oriented X3, mood and affect normal    Counseling     We reviewed the important post op bariatric recommendations:  -eating 3 meals daily  -eating protein first, getting >60gm protein daily  -eating slowly, chewing food well  -avoiding/limiting calorie containing beverages  -drinking water 15-30 minutes before or after meals  -choosing wheat, not white with breads, crackers, pastas, janey, bagels, tortillas, rice  -limiting restaurant or cafeteria eating to twice a week or less    We discussed the importance of restorative sleep and stress management in maintaining a healthy weight.  We discussed the National Weight Control Registry healthy weight maintenance strategies and ways to optimize metabolism.  We discussed the importance of physical activity including cardiovascular and strength training in maintaining a healthier weight.    We discussed the importance of life-long vitamin supplementation and life-long  follow-up.    Corby was reminded that, to avoid marginal ulcers she should avoid tobacco at all, alcohol in excess, caffeine in excess, and NSAIDS (unless indicated for cardioprotection or othewise and opposed by a PPI).    Tamica Giraldo MD, MD, [facility].  7/28/2023  3:08 PM  Total time spent on the date of this encounter doing: chart review, review of test results, patient visit, physical exam, education, counseling, developing plan of care, and documenting = 20 minutes.    Platform used for Telephone Visit: Telephone    Telephone Start Time:  3:05PM    Telephone End Time (time video stopped):  3:25PM    Originating Patient Location (pt. Location): Home      Distant Location (provider location):  On-site    Distant Location (provider location):  [facility] [place]

## 2023-10-07 ENCOUNTER — HEALTH MAINTENANCE LETTER (OUTPATIENT)
Age: 68
End: 2023-10-07

## 2023-10-31 ENCOUNTER — TELEPHONE (OUTPATIENT)
Dept: SURGERY | Facility: CLINIC | Age: 68
End: 2023-10-31
Payer: MEDICARE

## 2023-10-31 DIAGNOSIS — K90.9 INTESTINAL MALABSORPTION: ICD-10-CM

## 2023-10-31 DIAGNOSIS — K91.2 POSTOPERATIVE MALABSORPTION: ICD-10-CM

## 2023-10-31 DIAGNOSIS — Z98.84 HISTORY OF ROUX-EN-Y GASTRIC BYPASS: Primary | ICD-10-CM

## 2023-10-31 DIAGNOSIS — Z98.84 BARIATRIC SURGERY STATUS: ICD-10-CM

## 2023-10-31 DIAGNOSIS — E78.1 HYPERTRIGLYCERIDEMIA: ICD-10-CM

## 2023-10-31 NOTE — TELEPHONE ENCOUNTER
Annual labs.  Patient would like to have them done at the [facility] and will fax there for her.  Sofía Cespedes RN

## 2023-12-08 ENCOUNTER — VIRTUAL VISIT (OUTPATIENT)
Dept: SURGERY | Facility: CLINIC | Age: 68
End: 2023-12-08
Payer: COMMERCIAL

## 2023-12-08 VITALS — BODY MASS INDEX: 31.16 KG/M2 | HEIGHT: 65 IN | WEIGHT: 187 LBS

## 2023-12-08 DIAGNOSIS — F43.9 STRESS: ICD-10-CM

## 2023-12-08 DIAGNOSIS — K91.2 POSTOPERATIVE MALABSORPTION: Primary | ICD-10-CM

## 2023-12-08 DIAGNOSIS — R73.09 ELEVATED HEMOGLOBIN A1C: ICD-10-CM

## 2023-12-08 PROCEDURE — 99214 OFFICE O/P EST MOD 30 MIN: CPT | Mod: 93 | Performed by: FAMILY MEDICINE

## 2023-12-08 ASSESSMENT — PAIN SCALES - GENERAL: PAINLEVEL: EXTREME PAIN (8)

## 2023-12-08 NOTE — PROGRESS NOTES
Bariatric Follow Up Visit with a History of Previous Bariatric Surgery     Date of visit: 12/8/2023  Physician: Tamica Giraldo MD, MD  Primary Care Provider:  Provider, Historical  Corby Bradley   68 year old  female    Date of Surgery: 12/8/2010  Initial Weight: 280#  Initial BMI: 48.06  Today's Weight:   Wt Readings from Last 1 Encounters:   12/08/23 84.8 kg (187 lb)     Body mass index is 31.6 kg/m .      Assessment and Plan     Assessment: Corby is a 68 year old year old female who is 13 yrs s/p  Paul en Y Gastric Bypass with Dr. Diana Tavarez ANKIT Bradley feels as if she has achieved the goals she hoped to accomplish through bariatric surgery and weight loss.    Encounter Diagnoses   Name Primary?    Postoperative malabsorption Yes    Elevated hemoglobin A1c     Stress          Current Outpatient Medications:     atorvastatin (LIPITOR) 40 MG tablet, Take 40 mg by mouth, Disp: , Rfl:     BIOTIN PO, Take 500 mcg by mouth daily , Disp: , Rfl:     Butalbital-APAP-Caffeine -40 MG CAPS, 1-2 tab PO 1-2 times per day PRN migraine, Disp: , Rfl:     Calcium Carb-Cholecalciferol 600-800 MG-UNIT CHEW, Take 2 tablets by mouth, Disp: , Rfl:     carboxymethylcellulose (REFRESH PLUS) 0.5 % SOLN ophthalmic solution, 1 drop, Disp: , Rfl:     cyanocobalamin 1000 MCG SUBL sublingual tablet, Take 1 tablet every other day., Disp: , Rfl:     erythromycin (ROMYCIN) ophthalmic ointment, Apply small amount to incision sites three times daily until tube runs out., Disp: 3.5 g, Rfl: 0    estradiol (ESTRACE) 0.1 MG/GM vaginal cream, , Disp: , Rfl:     lisinopril (ZESTRIL) 20 MG tablet, Take 20 mg by mouth, Disp: , Rfl:     metFORMIN (GLUCOPHAGE) 500 MG tablet, Take 500 mg by mouth, Disp: , Rfl:     metroNIDAZOLE (METROCREAM) 0.75 % cream, Apply topically daily , Disp: , Rfl:     Multiple Vitamins-Minerals (MULTIVITAMIN ADULT) CHEW, Take 2 tablets by mouth every morning , Disp: , Rfl:     omeprazole (PRILOSEC) 20 MG   "capsule, Take 20 mg by mouth, Disp: , Rfl:     phentermine (ADIPEX-P) 37.5 MG tablet, TAKE ONE-HALF TO ONE TABLET BY MOUTH EVERY MORNING BEFORE BREAKFAST, Disp: 90 tablet, Rfl: 1    polyethylene glycol (MIRALAX/GLYCOLAX) powder, Take 17 g by mouth every morning , Disp: , Rfl:     prochlorperazine (COMPAZINE) 5 MG tablet, Take 5 mg by mouth, Disp: , Rfl:     vitamin D3 (CHOLECALCIFEROL) 125 MCG (5000 UT) tablet, Take 5,000 Units by mouth, Disp: , Rfl:     Plan: With high stress and grieving, encouraged self cares. Stress explains elevation in A1c. Continue vitamins with consistency as has always done. Protecting sleep and seeking support will help with resiliency. Gideon Vanda benefits likely in January when her  retires and changes to Medicare. Encouraged her to discuss this with her Medicare advisor when choosing a supplement.    Return in about 6 months (around 2024).    Bariatric Surgery Review     Interim History/LifeChanges: Doing well. Had a loss in the family. Step brother was drinking and never came home. Smoking something and . They think maybe meth or heroin. He had been staying with them for 1 1/2 years. Daughter had gone through something similar in the past so knew about resources but he never.Her sister came which was helpful.  is getting cataract surgery tomorrow. Celebrated her father's 90th birthday in [place] recently. Changing to Medicare in January. Will have Silver Sneakers    Patient Concerns: stress is high  Appetite (1-10): OK  GERD: no    Medication changes: no    Vitamin Intake:   B-12   SL   MVI  2/d   Vitamin D  5.000   Calcium   citrate     Other                LABS: \"Reviewed  A1c 6.2 vitamins and lipids excellent.  Nausea no  Vomiting no  Constipation no  Diarrhea no  Rashes no  Hair Loss no  Calf tenderness no  Breathing difficulty no  Reactive Hypoglycemia no  Light Headedness no   Moods grieving    12 point ROS as above and otherwise " negative      Habits:  Alcohol: no  Tobacco: no  Caffeine no  NSAIDS no  Exercise Routine: walking in and outside the house.  3 meals/day yes  Protein first yes  60grams/day  Water Separate from meals yes  Calorie Containing Beverages no  Restaurant eating/wk <2  Sleeping well  Stress high step brother   CPAP: NA  Contraception: PM  DEXA:next year    Social History     Social History     Socioeconomic History    Marital status:      Spouse name: Not on file    Number of children: Not on file    Years of education: Not on file    Highest education level: Not on file   Occupational History    Not on file   Tobacco Use    Smoking status: Former    Smokeless tobacco: Never    Tobacco comments:     Quit in    Substance and Sexual Activity    Alcohol use: Yes     Alcohol/week: 0.0 standard drinks of alcohol     Comment: Alcoholic Drinks/day: Rarely    Drug use: No    Sexual activity: Yes     Partners: Male     Birth control/protection: Other   Other Topics Concern    Not on file   Social History Narrative    . Semi-retired. Has Masters in International Business and worked at [employer]. 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 Has a daughter and  two grandsons.    She speaks Korean, Hebrew, Haitian, and some Divehi. Her father worked for [employer].     Social Determinants of Health     Financial Resource Strain: Not on file   Food Insecurity: Not on file   Transportation Needs: Not on file   Physical Activity: Not on file   Stress: Not on file   Social Connections: Not on file   Interpersonal Safety: Not on file   Housing Stability: Not on file       Past Medical History     Past Medical History:   Diagnosis Date    Back pain     Controlled type 2 diabetes mellitus without complication, without long-term current use of insulin (H) 2018    Fibromyalgia     Fibromyalgia, primary     History of anesthesia complications     difficult to wake up/bad headache once    History of  Paul-en-Y gastric bypass 01/01/2010    Hyperlipemia     Hypertension     Iron deficiency     Knee pain     Lactose intolerance     Low bone mass 1/2/2023    Migraine     Morbid obesity (H)     Noninfectious ileitis     Postoperative malabsorption     Sleep apnea     Urinary incontinence      Problem List     Patient Active Problem List   Diagnosis    Fibromyalgia, primary    Back pain    Knee pain    Migraine    Obesity (BMI 30-39.9)    History of Paul-en-Y gastric bypass    Postoperative malabsorption    Controlled type 2 diabetes mellitus without complication, without long-term current use of insulin (H)    Hypertension    Hypertriglyceridemia    Irritable bowel syndrome    Lactose intolerance    Lumbar disc disease    Pure hypercholesterolemia    Rosacea    Sleep apnea    Status post gastric bypass for obesity    Morbid obesity (H)    Low bone mass     Medications       Current Outpatient Medications:     atorvastatin (LIPITOR) 40 MG tablet, Take 40 mg by mouth, Disp: , Rfl:     BIOTIN PO, Take 500 mcg by mouth daily , Disp: , Rfl:     Butalbital-APAP-Caffeine -40 MG CAPS, 1-2 tab PO 1-2 times per day PRN migraine, Disp: , Rfl:     Calcium Carb-Cholecalciferol 600-800 MG-UNIT CHEW, Take 2 tablets by mouth, Disp: , Rfl:     carboxymethylcellulose (REFRESH PLUS) 0.5 % SOLN ophthalmic solution, 1 drop, Disp: , Rfl:     cyanocobalamin 1000 MCG SUBL sublingual tablet, Take 1 tablet every other day., Disp: , Rfl:     erythromycin (ROMYCIN) ophthalmic ointment, Apply small amount to incision sites three times daily until tube runs out., Disp: 3.5 g, Rfl: 0    estradiol (ESTRACE) 0.1 MG/GM vaginal cream, , Disp: , Rfl:     lisinopril (ZESTRIL) 20 MG tablet, Take 20 mg by mouth, Disp: , Rfl:     metFORMIN (GLUCOPHAGE) 500 MG tablet, Take 500 mg by mouth, Disp: , Rfl:     metroNIDAZOLE (METROCREAM) 0.75 % cream, Apply topically daily , Disp: , Rfl:     Multiple Vitamins-Minerals (MULTIVITAMIN ADULT) CHEW, Take 2  "tablets by mouth every morning , Disp: , Rfl:     omeprazole (PRILOSEC) 20 MG DR capsule, Take 20 mg by mouth, Disp: , Rfl:     phentermine (ADIPEX-P) 37.5 MG tablet, TAKE ONE-HALF TO ONE TABLET BY MOUTH EVERY MORNING BEFORE BREAKFAST, Disp: 90 tablet, Rfl: 1    polyethylene glycol (MIRALAX/GLYCOLAX) powder, Take 17 g by mouth every morning , Disp: , Rfl:     prochlorperazine (COMPAZINE) 5 MG tablet, Take 5 mg by mouth, Disp: , Rfl:     vitamin D3 (CHOLECALCIFEROL) 125 MCG (5000 UT) tablet, Take 5,000 Units by mouth, Disp: , Rfl:    Surgical History     Past Surgical History  She has a past surgical history that includes Enhance Laser Refractive Bilateral Existing Pt In Parameters (Bilateral, 2001); appendectomy; Cholecystectomy; Abdomen surgery; wisdom teeth extraction; Head and neck surgery; Blepharoplasty bilateral (Bilateral, 7/19/2017); Cholecystectomy; appendectomy; Colonoscopy; Lasik; Orif Zygomatic Fracture; Laparotomy exploratory; Abdominal Adhesion Surgery; Blepharoplasty (07/2017); and Revision Paul-En-Y (12/8/2010).    Objective-Exam     Constitutional:  Ht 1.638 m (5' 4.5\")   Wt 84.8 kg (187 lb)   BMI 31.60 kg/m    General:  Pleasant and in no acute distress     Psychiatric: alert and oriented X3, mood and affect normal    Counseling     We reviewed the important post op bariatric recommendations:  -eating 3 meals daily  -eating protein first, getting >60gm protein daily  -eating slowly, chewing food well  -avoiding/limiting calorie containing beverages  -drinking water 15-30 minutes before or after meals  -choosing wheat, not white with breads, crackers, pastas, janey, bagels, tortillas, rice  -limiting restaurant or cafeteria eating to twice a week or less    We discussed the importance of restorative sleep and stress management in maintaining a healthy weight.  We discussed the National Weight Control Registry healthy weight maintenance strategies and ways to optimize metabolism.  We discussed the " importance of physical activity including cardiovascular and strength training in maintaining a healthier weight.    We discussed the importance of life-long vitamin supplementation and life-long  follow-up.    Corby was reminded that, to avoid marginal ulcers she should avoid tobacco at all, alcohol in excess, caffeine in excess, and NSAIDS (unless indicated for cardioprotection or othewise and opposed by a PPI).    Tamica Giraldo MD, MD, [facility].  12/8/2023  9:22 AM  Total time spent on the date of this encounter doing: chart review, review of test results, patient visit, physical exam, education, counseling, developing plan of care, and documenting = 30 minutes.

## 2023-12-08 NOTE — LETTER
12/8/2023         RE: Corby Bradley  [address] [place] [address]        Dear Colleague,    Thank you for referring your patient, Corby Bradley, to the [facility] AND [facility] [place]. Please see a copy of my visit note below.      Bariatric Follow Up Visit with a History of Previous Bariatric Surgery     Date of visit: 12/8/2023  Physician: Tamica Giraldo MD, MD  Primary Care Provider:  Provider, Historical  Corby Bradley   68 year old  female    Date of Surgery: 12/8/2010  Initial Weight: 280#  Initial BMI: 48.06  Today's Weight:   Wt Readings from Last 1 Encounters:   12/08/23 84.8 kg (187 lb)     Body mass index is 31.6 kg/m .      Assessment and Plan     Assessment: Corby is a 68 year old year old female who is 13 yrs s/p  Paul en Y Gastric Bypass with Dr. Ortiz  Corby Bradley feels as if she has achieved the goals she hoped to accomplish through bariatric surgery and weight loss.    Encounter Diagnoses   Name Primary?     Postoperative malabsorption Yes     Elevated hemoglobin A1c      Stress          Current Outpatient Medications:      atorvastatin (LIPITOR) 40 MG tablet, Take 40 mg by mouth, Disp: , Rfl:      BIOTIN PO, Take 500 mcg by mouth daily , Disp: , Rfl:      Butalbital-APAP-Caffeine -40 MG CAPS, 1-2 tab PO 1-2 times per day PRN migraine, Disp: , Rfl:      Calcium Carb-Cholecalciferol 600-800 MG-UNIT CHEW, Take 2 tablets by mouth, Disp: , Rfl:      carboxymethylcellulose (REFRESH PLUS) 0.5 % SOLN ophthalmic solution, 1 drop, Disp: , Rfl:      cyanocobalamin 1000 MCG SUBL sublingual tablet, Take 1 tablet every other day., Disp: , Rfl:      erythromycin (ROMYCIN) ophthalmic ointment, Apply small amount to incision sites three times daily until tube runs out., Disp: 3.5 g, Rfl: 0     estradiol (ESTRACE) 0.1 MG/GM vaginal cream, , Disp: , Rfl:      lisinopril (ZESTRIL) 20 MG tablet, Take 20 mg by mouth, Disp: , Rfl:       metFORMIN (GLUCOPHAGE) 500 MG tablet, Take 500 mg by mouth, Disp: , Rfl:      metroNIDAZOLE (METROCREAM) 0.75 % cream, Apply topically daily , Disp: , Rfl:      Multiple Vitamins-Minerals (MULTIVITAMIN ADULT) CHEW, Take 2 tablets by mouth every morning , Disp: , Rfl:      omeprazole (PRILOSEC) 20 MG DR capsule, Take 20 mg by mouth, Disp: , Rfl:      phentermine (ADIPEX-P) 37.5 MG tablet, TAKE ONE-HALF TO ONE TABLET BY MOUTH EVERY MORNING BEFORE BREAKFAST, Disp: 90 tablet, Rfl: 1     polyethylene glycol (MIRALAX/GLYCOLAX) powder, Take 17 g by mouth every morning , Disp: , Rfl:      prochlorperazine (COMPAZINE) 5 MG tablet, Take 5 mg by mouth, Disp: , Rfl:      vitamin D3 (CHOLECALCIFEROL) 125 MCG (5000 UT) tablet, Take 5,000 Units by mouth, Disp: , Rfl:     Plan: With high stress and grieving, encouraged self cares. Stress explains elevation in A1c. Continue vitamins with consistency as has always done. Protecting sleep and seeking support will help with resiliency. [facility] benefits likely in January when her  retires and changes to Medicare. Encouraged her to discuss this with her Medicare advisor when choosing a supplement.    Return in about 6 months (around 2024).    Bariatric Surgery Review     Interim History/LifeChanges: Doing well. Had a loss in the family. Step brother was drinking and never came home. Smoking something and . They think maybe meth or heroin. He had been staying with them for 1 1/2 years. Daughter had gone through something similar in the past so knew about resources but he never.Her sister came which was helpful.  is getting cataract surgery tomorrow. Celebrated her father's 90th birthday in [place] recently. Changing to Medicare in January. Will have Silver Sneakers    Patient Concerns: stress is high  Appetite (1-10): OK  GERD: no    Medication changes: no    Vitamin Intake:   B-12   SL   MVI  2/d   Vitamin D  5.000   Calcium   citrate     Other               "  LABS: \"Reviewed  A1c 6.2 vitamins and lipids excellent.  Nausea no  Vomiting no  Constipation no  Diarrhea no  Rashes no  Hair Loss no  Calf tenderness no  Breathing difficulty no  Reactive Hypoglycemia no  Light Headedness no   Moods grieving    12 point ROS as above and otherwise negative      Habits:  Alcohol: no  Tobacco: no  Caffeine no  NSAIDS no  Exercise Routine: walking in and outside the house.  3 meals/day yes  Protein first yes  60grams/day  Water Separate from meals yes  Calorie Containing Beverages no  Restaurant eating/wk <2  Sleeping well  Stress high step brother   CPAP: NA  Contraception: PM  DEXA:next year    Social History     Social History     Socioeconomic History     Marital status:      Spouse name: Not on file     Number of children: Not on file     Years of education: Not on file     Highest education level: Not on file   Occupational History     Not on file   Tobacco Use     Smoking status: Former     Smokeless tobacco: Never     Tobacco comments:     Quit in    Substance and Sexual Activity     Alcohol use: Yes     Alcohol/week: 0.0 standard drinks of alcohol     Comment: Alcoholic Drinks/day: Rarely     Drug use: No     Sexual activity: Yes     Partners: Male     Birth control/protection: Other   Other Topics Concern     Not on file   Social History Narrative    . Semi-retired. Has Masters in International Business and worked at [employer]. 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 Has a daughter and  two grandsons.    She speaks Bruneian, Andorran, Malaysian, and some Lebanese. Her father worked for [employer].     Social Determinants of Health     Financial Resource Strain: Not on file   Food Insecurity: Not on file   Transportation Needs: Not on file   Physical Activity: Not on file   Stress: Not on file   Social Connections: Not on file   Interpersonal Safety: Not on file   Housing Stability: Not on file       Past Medical History     Past " Medical History:   Diagnosis Date     Back pain      Controlled type 2 diabetes mellitus without complication, without long-term current use of insulin (H) 01/01/2018     Fibromyalgia      Fibromyalgia, primary      History of anesthesia complications     difficult to wake up/bad headache once     History of Paul-en-Y gastric bypass 01/01/2010     Hyperlipemia      Hypertension      Iron deficiency      Knee pain      Lactose intolerance      Low bone mass 1/2/2023     Migraine      Morbid obesity (H)      Noninfectious ileitis      Postoperative malabsorption      Sleep apnea      Urinary incontinence      Problem List     Patient Active Problem List   Diagnosis     Fibromyalgia, primary     Back pain     Knee pain     Migraine     Obesity (BMI 30-39.9)     History of Paul-en-Y gastric bypass     Postoperative malabsorption     Controlled type 2 diabetes mellitus without complication, without long-term current use of insulin (H)     Hypertension     Hypertriglyceridemia     Irritable bowel syndrome     Lactose intolerance     Lumbar disc disease     Pure hypercholesterolemia     Rosacea     Sleep apnea     Status post gastric bypass for obesity     Morbid obesity (H)     Low bone mass     Medications       Current Outpatient Medications:      atorvastatin (LIPITOR) 40 MG tablet, Take 40 mg by mouth, Disp: , Rfl:      BIOTIN PO, Take 500 mcg by mouth daily , Disp: , Rfl:      Butalbital-APAP-Caffeine -40 MG CAPS, 1-2 tab PO 1-2 times per day PRN migraine, Disp: , Rfl:      Calcium Carb-Cholecalciferol 600-800 MG-UNIT CHEW, Take 2 tablets by mouth, Disp: , Rfl:      carboxymethylcellulose (REFRESH PLUS) 0.5 % SOLN ophthalmic solution, 1 drop, Disp: , Rfl:      cyanocobalamin 1000 MCG SUBL sublingual tablet, Take 1 tablet every other day., Disp: , Rfl:      erythromycin (ROMYCIN) ophthalmic ointment, Apply small amount to incision sites three times daily until tube runs out., Disp: 3.5 g, Rfl: 0     estradiol  "(ESTRACE) 0.1 MG/GM vaginal cream, , Disp: , Rfl:      lisinopril (ZESTRIL) 20 MG tablet, Take 20 mg by mouth, Disp: , Rfl:      metFORMIN (GLUCOPHAGE) 500 MG tablet, Take 500 mg by mouth, Disp: , Rfl:      metroNIDAZOLE (METROCREAM) 0.75 % cream, Apply topically daily , Disp: , Rfl:      Multiple Vitamins-Minerals (MULTIVITAMIN ADULT) CHEW, Take 2 tablets by mouth every morning , Disp: , Rfl:      omeprazole (PRILOSEC) 20 MG DR capsule, Take 20 mg by mouth, Disp: , Rfl:      phentermine (ADIPEX-P) 37.5 MG tablet, TAKE ONE-HALF TO ONE TABLET BY MOUTH EVERY MORNING BEFORE BREAKFAST, Disp: 90 tablet, Rfl: 1     polyethylene glycol (MIRALAX/GLYCOLAX) powder, Take 17 g by mouth every morning , Disp: , Rfl:      prochlorperazine (COMPAZINE) 5 MG tablet, Take 5 mg by mouth, Disp: , Rfl:      vitamin D3 (CHOLECALCIFEROL) 125 MCG (5000 UT) tablet, Take 5,000 Units by mouth, Disp: , Rfl:    Surgical History     Past Surgical History  She has a past surgical history that includes Enhance Laser Refractive Bilateral Existing Pt In Parameters (Bilateral, 2001); appendectomy; Cholecystectomy; Abdomen surgery; wisdom teeth extraction; Head and neck surgery; Blepharoplasty bilateral (Bilateral, 7/19/2017); Cholecystectomy; appendectomy; Colonoscopy; Lasik; Orif Zygomatic Fracture; Laparotomy exploratory; Abdominal Adhesion Surgery; Blepharoplasty (07/2017); and Revision Paul-En-Y (12/8/2010).    Objective-Exam     Constitutional:  Ht 1.638 m (5' 4.5\")   Wt 84.8 kg (187 lb)   BMI 31.60 kg/m    General:  Pleasant and in no acute distress     Psychiatric: alert and oriented X3, mood and affect normal    Counseling     We reviewed the important post op bariatric recommendations:  -eating 3 meals daily  -eating protein first, getting >60gm protein daily  -eating slowly, chewing food well  -avoiding/limiting calorie containing beverages  -drinking water 15-30 minutes before or after meals  -choosing wheat, not white with breads, " crackers, pastas, janey, bagels, tortillas, rice  -limiting restaurant or cafeteria eating to twice a week or less    We discussed the importance of restorative sleep and stress management in maintaining a healthy weight.  We discussed the National Weight Control Registry healthy weight maintenance strategies and ways to optimize metabolism.  We discussed the importance of physical activity including cardiovascular and strength training in maintaining a healthier weight.    We discussed the importance of life-long vitamin supplementation and life-long  follow-up.    Corby was reminded that, to avoid marginal ulcers she should avoid tobacco at all, alcohol in excess, caffeine in excess, and NSAIDS (unless indicated for cardioprotection or othewise and opposed by a PPI).    Tamica Giraldo MD, MD, [facility].  12/8/2023  9:22 AM  Total time spent on the date of this encounter doing: chart review, review of test results, patient visit, physical exam, education, counseling, developing plan of care, and documenting = 30 minutes.    Virtual Visit Details    Type of service:  Telephone Visit   Phone call duration: 30 minutes       Again, thank you for allowing me to participate in the care of your patient.        Sincerely,        Tamica Giraldo MD

## 2023-12-08 NOTE — NURSING NOTE
Is the patient currently in the state of [place]? YES    Visit mode:TELEPHONE    If the visit is dropped, the patient can be reconnected by: TELEPHONE VISIT: Phone number:   Telephone Information:   Mobile [phone]       Will anyone else be joining the visit? NO  (If patient encounters technical issues they should call [phone]    How would you like to obtain your AVS? MyChart    Are changes needed to the allergy or medication list? No, Pt stated no changes to allergies, and Pt stated no med changes    Reason for visit: RECHECK ([place])    Rebecca Powell VVF    Pt stated taking AZO for bladder control.

## 2024-02-24 ENCOUNTER — HEALTH MAINTENANCE LETTER (OUTPATIENT)
Age: 69
End: 2024-02-24

## 2024-05-31 ENCOUNTER — VIRTUAL VISIT (OUTPATIENT)
Dept: SURGERY | Facility: CLINIC | Age: 69
End: 2024-05-31
Payer: MEDICARE

## 2024-05-31 VITALS — HEIGHT: 65 IN | BODY MASS INDEX: 31.82 KG/M2 | WEIGHT: 191 LBS

## 2024-05-31 DIAGNOSIS — E66.9 OBESITY (BMI 30-39.9): ICD-10-CM

## 2024-05-31 DIAGNOSIS — R73.09 ELEVATED HEMOGLOBIN A1C: Primary | ICD-10-CM

## 2024-05-31 PROCEDURE — 99443 PR PHYSICIAN TELEPHONE EVALUATION 21-30 MIN: CPT | Mod: 93 | Performed by: FAMILY MEDICINE

## 2024-05-31 RX ORDER — PHENTERMINE HYDROCHLORIDE 37.5 MG/1
TABLET ORAL
Qty: 90 TABLET | Refills: 1 | Status: SHIPPED | OUTPATIENT
Start: 2024-05-31

## 2024-05-31 RX ORDER — GABAPENTIN 300 MG/1
CAPSULE ORAL
COMMUNITY
Start: 2024-04-29

## 2024-05-31 ASSESSMENT — PAIN SCALES - GENERAL: PAINLEVEL: NO PAIN (0)

## 2024-05-31 NOTE — PROGRESS NOTES
Virtual Visit Details    Type of service:  Telephone Visit   Phone call duration: 31 minutes   Originating Location (pt. Location): Home    Distant Location (provider location):  On-site    Bariatric Follow Up Visit with a History of Previous Bariatric Surgery     Date of visit: 5/31/2024  Physician: Tamica Giraldo MD, MD  Primary Care Provider:  Provider, Gaby  Corby Bradley   69 year old  female    Date of Surgery: 12/8/2010  Initial Weight: 280#  Initial BMI: 48.06  Today's Weight:   Wt Readings from Last 1 Encounters:   05/31/24 86.6 kg (191 lb)     Body mass index is 32.28 kg/m .      Assessment and Plan     Assessment: Corby is a 69 year old year old female who is 14 yrs s/p  Paul en Y Gastric Bypass with Dr. Ortiz  Corby Bradley feels as if she has achieved the goals she hoped to accomplish through bariatric surgery and weight loss.    Encounter Diagnoses   Name Primary?    Obesity (BMI 30-39.9)     Elevated hemoglobin A1c Yes         Current Outpatient Medications:     gabapentin (NEURONTIN) 300 MG capsule, TAKE 1 CAPSULE BY MOUTH ONCE DAILY AT BEDTIME FOR 3 DAYS , THEN INCREASE TO 1 CAPSULE THREE TIMES DAILY, Disp: , Rfl:     metFORMIN (GLUCOPHAGE) 500 MG tablet, Take 1 tablet (500 mg) by mouth 2 times daily (with meals), Disp: 180 tablet, Rfl: 3    phentermine (ADIPEX-P) 37.5 MG tablet, TAKE ONE-HALF TO ONE TABLET BY MOUTH EVERY MORNING BEFORE BREAKFAST, Disp: 90 tablet, Rfl: 1    atorvastatin (LIPITOR) 40 MG tablet, Take 40 mg by mouth, Disp: , Rfl:     BIOTIN PO, Take 500 mcg by mouth daily , Disp: , Rfl:     Butalbital-APAP-Caffeine -40 MG CAPS, 1-2 tab PO 1-2 times per day PRN migraine, Disp: , Rfl:     Calcium Carb-Cholecalciferol 600-800 MG-UNIT CHEW, Take 2 tablets by mouth, Disp: , Rfl:     carboxymethylcellulose (REFRESH PLUS) 0.5 % SOLN ophthalmic solution, 1 drop, Disp: , Rfl:     cyanocobalamin 1000 MCG SUBL sublingual tablet, Take 1 tablet every other day., Disp: ,  "Rfl:     erythromycin (ROMYCIN) ophthalmic ointment, Apply small amount to incision sites three times daily until tube runs out., Disp: 3.5 g, Rfl: 0    estradiol (ESTRACE) 0.1 MG/GM vaginal cream, , Disp: , Rfl:     lisinopril (ZESTRIL) 20 MG tablet, Take 20 mg by mouth, Disp: , Rfl:     metroNIDAZOLE (METROCREAM) 0.75 % cream, Apply topically daily , Disp: , Rfl:     Multiple Vitamins-Minerals (MULTIVITAMIN ADULT) CHEW, Take 2 tablets by mouth every morning , Disp: , Rfl:     omeprazole (PRILOSEC) 20 MG DR capsule, Take 20 mg by mouth, Disp: , Rfl:     polyethylene glycol (MIRALAX/GLYCOLAX) powder, Take 17 g by mouth every morning , Disp: , Rfl:     prochlorperazine (COMPAZINE) 5 MG tablet, Take 5 mg by mouth, Disp: , Rfl:     vitamin D3 (CHOLECALCIFEROL) 125 MCG (5000 UT) tablet, Take 5,000 Units by mouth, Disp: , Rfl:     Plan: Check in to Silver Sneakers benefits. Continue vitamins with consistency. Contiue to pursue restorative sleep. Could re-try trazodone if necessary. Increase metformin to 2 daily. Immediate release as is prone to constipation. If diarrhea then would change to extended release. Refill phentermine.    Return in about 1 year (around 5/31/2025).    Bariatric Surgery Review     Interim History/LifeChanges: Maintaining a 89# weight loss. Enjoying [place]. Walking. Doing a lot of [employer] foods/shakes.Plant based more.     Patient Concerns: doing well  Appetite (1-10): OK  GERD: no. On PPI    Medication changes: none    Vitamin Intake:   B-12   SL   MVI  2/d   Vitamin D  5,000   Calcium   citrate     Other                LABS: \"Reviewed  A1c 6.2  Nausea no  Vomiting no  Constipation no  Diarrhea no  Rashes no  Hair Loss no  Calf tenderness no  Breathing difficulty no  Reactive Hypoglycemia   Light Headedness no   Moods euthymic    12 point ROS as above and otherwise negative      Habits:  Alcohol: 0-1/year  Tobacco: no  Caffeine coffee  NSAIDS no  Exercise Routine: walking daily or every other and " pacing gardening, counting steps, 6000-10K steps with friend. Resistance. Stretching and leg lifting and bands  3 meals/day yes  Protein first yes  60+grams/day  Water Separate from meals yes  Calorie Containing Beverages no  Restaurant eating/wk 0-1  Sleeping on gabapentin which is helping. FM. Getting 7 hours  Stress low  CPAP: NA  Contraception: PM  DEXA:due 2025    Social History     Social History     Socioeconomic History    Marital status:      Spouse name: Not on file    Number of children: Not on file    Years of education: Not on file    Highest education level: Not on file   Occupational History    Not on file   Tobacco Use    Smoking status: Former    Smokeless tobacco: Never    Tobacco comments:     Quit in 1986   Substance and Sexual Activity    Alcohol use: Yes     Alcohol/week: 0.0 standard drinks of alcohol     Comment: Alcoholic Drinks/day: Rarely    Drug use: No    Sexual activity: Yes     Partners: Male     Birth control/protection: Other   Other Topics Concern    Not on file   Social History Narrative    . Retired. Has Masters in International Business and worked at [employer]. 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 Has a daughter and  two grandsons.    She speaks Turkmen, Irish, Yemeni, and some Bengali. Her father worked for [employer].     Social Determinants of Health     Financial Resource Strain: Low Risk  (10/4/2023)    [facility], [facility] & [facility]    Financial Resource Strain     Difficulty of Paying Living Expenses: 3     Difficulty of Paying Living Expenses: Not on file   Food Insecurity: No Food Insecurity (10/4/2023)    Received from [facility], [facility] & [facility]    Food Insecurity     Worried About Running Out of Food in the Last Year: 1   Transportation Needs: No Transportation Needs (10/4/2023)    [facility]  [facility], [facility]    Transportation Needs     Lack of Transportation (Medical): 1   Physical Activity: Not on file   Stress: Not on file   Social Connections: Socially Integrated (10/4/2023)    [facility] from [facility], [facility]    Social Connections     Frequency of Communication with Friends and Family: 0   Interpersonal Safety: Not on file   Housing Stability: Low Risk  (10/4/2023)    [facility] from [facility], [facility]    Housing Stability     Unable to Pay for Housing in the Last Year: 1       Past Medical History     Past Medical History:   Diagnosis Date    Back pain     Controlled type 2 diabetes mellitus without complication, without long-term current use of insulin (H) 01/01/2018    Fibromyalgia     Fibromyalgia, primary     History of anesthesia complications     difficult to wake up/bad headache once    History of Paul-en-Y gastric bypass 01/01/2010    Hyperlipemia     Hypertension     Iron deficiency     Knee pain     Lactose intolerance     Low bone mass 1/2/2023    Migraine     Morbid obesity (H)     Noninfectious ileitis     Postoperative malabsorption     Sleep apnea     Urinary incontinence      Problem List     Patient Active Problem List   Diagnosis    Fibromyalgia, primary    Back pain    Knee pain    Migraine    Obesity (BMI 30-39.9)    History of Paul-en-Y gastric bypass    Postoperative malabsorption    Controlled type 2 diabetes mellitus without complication, without long-term current use of insulin (H)    Hypertension    Hypertriglyceridemia    Irritable bowel syndrome    Lactose intolerance    Lumbar disc disease    Pure hypercholesterolemia    Rosacea    Sleep apnea    Status post gastric bypass for obesity    Morbid obesity (H)    Low bone mass     Medications       Current  Outpatient Medications:     gabapentin (NEURONTIN) 300 MG capsule, TAKE 1 CAPSULE BY MOUTH ONCE DAILY AT BEDTIME FOR 3 DAYS , THEN INCREASE TO 1 CAPSULE THREE TIMES DAILY, Disp: , Rfl:     metFORMIN (GLUCOPHAGE) 500 MG tablet, Take 1 tablet (500 mg) by mouth 2 times daily (with meals), Disp: 180 tablet, Rfl: 3    phentermine (ADIPEX-P) 37.5 MG tablet, TAKE ONE-HALF TO ONE TABLET BY MOUTH EVERY MORNING BEFORE BREAKFAST, Disp: 90 tablet, Rfl: 1    atorvastatin (LIPITOR) 40 MG tablet, Take 40 mg by mouth, Disp: , Rfl:     BIOTIN PO, Take 500 mcg by mouth daily , Disp: , Rfl:     Butalbital-APAP-Caffeine -40 MG CAPS, 1-2 tab PO 1-2 times per day PRN migraine, Disp: , Rfl:     Calcium Carb-Cholecalciferol 600-800 MG-UNIT CHEW, Take 2 tablets by mouth, Disp: , Rfl:     carboxymethylcellulose (REFRESH PLUS) 0.5 % SOLN ophthalmic solution, 1 drop, Disp: , Rfl:     cyanocobalamin 1000 MCG SUBL sublingual tablet, Take 1 tablet every other day., Disp: , Rfl:     erythromycin (ROMYCIN) ophthalmic ointment, Apply small amount to incision sites three times daily until tube runs out., Disp: 3.5 g, Rfl: 0    estradiol (ESTRACE) 0.1 MG/GM vaginal cream, , Disp: , Rfl:     lisinopril (ZESTRIL) 20 MG tablet, Take 20 mg by mouth, Disp: , Rfl:     metroNIDAZOLE (METROCREAM) 0.75 % cream, Apply topically daily , Disp: , Rfl:     Multiple Vitamins-Minerals (MULTIVITAMIN ADULT) CHEW, Take 2 tablets by mouth every morning , Disp: , Rfl:     omeprazole (PRILOSEC) 20 MG DR capsule, Take 20 mg by mouth, Disp: , Rfl:     polyethylene glycol (MIRALAX/GLYCOLAX) powder, Take 17 g by mouth every morning , Disp: , Rfl:     prochlorperazine (COMPAZINE) 5 MG tablet, Take 5 mg by mouth, Disp: , Rfl:     vitamin D3 (CHOLECALCIFEROL) 125 MCG (5000 UT) tablet, Take 5,000 Units by mouth, Disp: , Rfl:    Surgical History     Past Surgical History  She has a past surgical history that includes Enhance Laser Refractive Bilateral Existing Pt In  "Parameters (Bilateral, 2001); appendectomy; Cholecystectomy; Abdomen surgery; wisdom teeth extraction; Head and neck surgery; Blepharoplasty bilateral (Bilateral, 7/19/2017); Cholecystectomy; appendectomy; Colonoscopy; Lasik; Orif Zygomatic Fracture; Laparotomy exploratory; Abdominal Adhesion Surgery; Blepharoplasty (07/2017); and Revision Paul-En-Y (12/8/2010).    Objective-Exam     Constitutional:  Ht 1.638 m (5' 4.5\")   Wt 86.6 kg (191 lb)   BMI 32.28 kg/m    General:  Pleasant and in no acute distress     Psychiatric: alert and oriented X3, mood and affect normal    Counseling     We reviewed the important post op bariatric recommendations:  -eating 3 meals daily  -eating protein first, getting >60gm protein daily  -eating slowly, chewing food well  -avoiding/limiting calorie containing beverages  -drinking water 15-30 minutes before or after meals  -choosing wheat, not white with breads, crackers, pastas, janey, bagels, tortillas, rice  -limiting restaurant or cafeteria eating to twice a week or less    We discussed the importance of restorative sleep and stress management in maintaining a healthy weight.  We discussed the National Weight Control Registry healthy weight maintenance strategies and ways to optimize metabolism.  We discussed the importance of physical activity including cardiovascular and strength training in maintaining a healthier weight.    We discussed the importance of life-long vitamin supplementation and life-long  follow-up.    Corby was reminded that, to avoid marginal ulcers she should avoid tobacco at all, alcohol in excess, caffeine in excess, and NSAIDS (unless indicated for cardioprotection or othewise and opposed by a PPI).    Tamica Giraldo MD, MD, [facility].  5/31/2024  9:43 AM  Total time spent on the date of this encounter doing: chart review, review of test results, patient visit, physical exam, education, counseling, developing plan of " care, and documenting = 31 minutes.

## 2024-05-31 NOTE — LETTER
5/31/2024         RE: Corby Bradley  [address] [place] [address]        Dear Colleague,    Thank you for referring your patient, Corby Bradley, to the [facility] AND [facility] [place]. Please see a copy of my visit note below.    Virtual Visit Details    Type of service:  Telephone Visit   Phone call duration: 31 minutes   Originating Location (pt. Location): Home  {PROVIDER LOCATION On-site should be selected for visits conducted from your clinic location or adjoining [facility] hospital, academic office, or other nearby [facility] building. Off-site should be selected for all other provider locations, including home:[id number]}  Distant Location (provider location):  On-site    Bariatric Follow Up Visit with a History of Previous Bariatric Surgery     Date of visit: 5/31/2024  Physician: Tamica Giraldo MD, MD  Primary Care Provider:  Provider, Gaby  Corby Bradley   69 year old  female    Date of Surgery: 12/8/2010  Initial Weight: 280#  Initial BMI: 48.06  Today's Weight:   Wt Readings from Last 1 Encounters:   05/31/24 86.6 kg (191 lb)     Body mass index is 32.28 kg/m .      Assessment and Plan     Assessment: Corby is a 69 year old year old female who is 14 yrs s/p  Paul en Y Gastric Bypass with Dr. Ortiz  Corby Bradley feels as if she has achieved the goals she hoped to accomplish through bariatric surgery and weight loss.    Encounter Diagnoses   Name Primary?     Obesity (BMI 30-39.9)      Elevated hemoglobin A1c Yes         Current Outpatient Medications:      gabapentin (NEURONTIN) 300 MG capsule, TAKE 1 CAPSULE BY MOUTH ONCE DAILY AT BEDTIME FOR 3 DAYS , THEN INCREASE TO 1 CAPSULE THREE TIMES DAILY, Disp: , Rfl:      metFORMIN (GLUCOPHAGE) 500 MG tablet, Take 1 tablet (500 mg) by mouth 2 times daily (with meals), Disp: 180 tablet, Rfl: 3     phentermine (ADIPEX-P) 37.5 MG tablet, TAKE ONE-HALF TO ONE TABLET BY MOUTH EVERY MORNING BEFORE BREAKFAST,  Disp: 90 tablet, Rfl: 1     atorvastatin (LIPITOR) 40 MG tablet, Take 40 mg by mouth, Disp: , Rfl:      BIOTIN PO, Take 500 mcg by mouth daily , Disp: , Rfl:      Butalbital-APAP-Caffeine -40 MG CAPS, 1-2 tab PO 1-2 times per day PRN migraine, Disp: , Rfl:      Calcium Carb-Cholecalciferol 600-800 MG-UNIT CHEW, Take 2 tablets by mouth, Disp: , Rfl:      carboxymethylcellulose (REFRESH PLUS) 0.5 % SOLN ophthalmic solution, 1 drop, Disp: , Rfl:      cyanocobalamin 1000 MCG SUBL sublingual tablet, Take 1 tablet every other day., Disp: , Rfl:      erythromycin (ROMYCIN) ophthalmic ointment, Apply small amount to incision sites three times daily until tube runs out., Disp: 3.5 g, Rfl: 0     estradiol (ESTRACE) 0.1 MG/GM vaginal cream, , Disp: , Rfl:      lisinopril (ZESTRIL) 20 MG tablet, Take 20 mg by mouth, Disp: , Rfl:      metroNIDAZOLE (METROCREAM) 0.75 % cream, Apply topically daily , Disp: , Rfl:      Multiple Vitamins-Minerals (MULTIVITAMIN ADULT) CHEW, Take 2 tablets by mouth every morning , Disp: , Rfl:      omeprazole (PRILOSEC) 20 MG DR capsule, Take 20 mg by mouth, Disp: , Rfl:      polyethylene glycol (MIRALAX/GLYCOLAX) powder, Take 17 g by mouth every morning , Disp: , Rfl:      prochlorperazine (COMPAZINE) 5 MG tablet, Take 5 mg by mouth, Disp: , Rfl:      vitamin D3 (CHOLECALCIFEROL) 125 MCG (5000 UT) tablet, Take 5,000 Units by mouth, Disp: , Rfl:     Plan: Check in to [employer] benefits. Continue vitamins with consistency. Contiue to pursue restorative sleep. Could re-try trazodone if necessary. Increase metformin to 2 daily. Immediate release as is prone to constipation. If diarrhea then would change to extended release. Refill phentermine.    Return in about 1 year (around 5/31/2025).    Bariatric Surgery Review     Interim History/LifeChanges: Maintaining a 89# weight loss. Enjoying [place]. Walking. Doing a lot of [employer] foods/shakes.Plant based more.     Patient Concerns: doing  "well  Appetite (1-10): OK  GERD: no. On PPI    Medication changes: none    Vitamin Intake:   B-12   SL   MVI  2/d   Vitamin D  5,000   Calcium   citrate     Other                LABS: \"Reviewed  A1c 6.2  Nausea no  Vomiting no  Constipation no  Diarrhea no  Rashes no  Hair Loss no  Calf tenderness no  Breathing difficulty no  Reactive Hypoglycemia   Light Headedness no   Moods euthymic    12 point ROS as above and otherwise negative      Habits:  Alcohol: 0-1/year  Tobacco: no  Caffeine coffee  NSAIDS no  Exercise Routine: walking daily or every other and pacing gardening, counting steps, 6000-10K steps with friend. Resistance. Stretching and leg lifting and bands  3 meals/day yes  Protein first yes  60+grams/day  Water Separate from meals yes  Calorie Containing Beverages no  Restaurant eating/wk 0-1  Sleeping on gabapentin which is helping. FM. Getting 7 hours  Stress low  CPAP: NA  Contraception: PM  DEXA:due 2025    Social History     Social History     Socioeconomic History     Marital status:      Spouse name: Not on file     Number of children: Not on file     Years of education: Not on file     Highest education level: Not on file   Occupational History     Not on file   Tobacco Use     Smoking status: Former     Smokeless tobacco: Never     Tobacco comments:     Quit in 1986   Substance and Sexual Activity     Alcohol use: Yes     Alcohol/week: 0.0 standard drinks of alcohol     Comment: Alcoholic Drinks/day: Rarely     Drug use: No     Sexual activity: Yes     Partners: Male     Birth control/protection: Other   Other Topics Concern     Not on file   Social History Narrative    . Retired. Has Masters in International Business and worked at [employer]. 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 Has a daughter and  two grandsons.    She speaks Turkish, British, Yi, and some Greenlandic. Her father worked for [employer].     Social Determinants of Health     Financial Resource " Strain: Low Risk  (10/4/2023)    [place] [facility] [place] [facility], [facility]    Financial Resource Strain      Difficulty of Paying Living Expenses: 3      Difficulty of Paying Living Expenses: Not on file   Food Insecurity: No Food Insecurity (10/4/2023)    Received from [facility] [employer] [facility], [facility]    Food Insecurity      Worried About Running Out of Food in the Last Year: 1   Transportation Needs: No Transportation Needs (10/4/2023)    Received from [facility], [facility]    Transportation Needs      Lack of Transportation (Medical): 1   Physical Activity: Not on file   Stress: Not on file   Social Connections: Socially Integrated (10/4/2023)    Received from [facility] Check [facility] [employer] [facility], [facility]    Social Connections      Frequency of Communication with Friends and Family: 0   Interpersonal Safety: Not on file   Housing Stability: Low Risk  (10/4/2023)    Received from [facility], [facility] Check [facility]    Housing Stability      Unable to Pay for Housing in the Last Year: 1       Past Medical History     Past Medical History:   Diagnosis Date     Back pain      Controlled type 2 diabetes mellitus without complication, without long-term current use of insulin (H) 01/01/2018     Fibromyalgia      Fibromyalgia, primary      History of anesthesia complications     difficult to wake up/bad headache once     History of Paul-en-Y gastric bypass 01/01/2010     Hyperlipemia      Hypertension      Iron deficiency      Knee pain      Lactose intolerance      Low bone mass 1/2/2023     Migraine      Morbid obesity (H)      Noninfectious ileitis      Postoperative malabsorption      Sleep apnea      Urinary incontinence       Problem List     Patient Active Problem List   Diagnosis     Fibromyalgia, primary     Back pain     Knee pain     Migraine     Obesity (BMI 30-39.9)     History of Paul-en-Y gastric bypass     Postoperative malabsorption     Controlled type 2 diabetes mellitus without complication, without long-term current use of insulin (H)     Hypertension     Hypertriglyceridemia     Irritable bowel syndrome     Lactose intolerance     Lumbar disc disease     Pure hypercholesterolemia     Rosacea     Sleep apnea     Status post gastric bypass for obesity     Morbid obesity (H)     Low bone mass     Medications       Current Outpatient Medications:      gabapentin (NEURONTIN) 300 MG capsule, TAKE 1 CAPSULE BY MOUTH ONCE DAILY AT BEDTIME FOR 3 DAYS , THEN INCREASE TO 1 CAPSULE THREE TIMES DAILY, Disp: , Rfl:      metFORMIN (GLUCOPHAGE) 500 MG tablet, Take 1 tablet (500 mg) by mouth 2 times daily (with meals), Disp: 180 tablet, Rfl: 3     phentermine (ADIPEX-P) 37.5 MG tablet, TAKE ONE-HALF TO ONE TABLET BY MOUTH EVERY MORNING BEFORE BREAKFAST, Disp: 90 tablet, Rfl: 1     atorvastatin (LIPITOR) 40 MG tablet, Take 40 mg by mouth, Disp: , Rfl:      BIOTIN PO, Take 500 mcg by mouth daily , Disp: , Rfl:      Butalbital-APAP-Caffeine -40 MG CAPS, 1-2 tab PO 1-2 times per day PRN migraine, Disp: , Rfl:      Calcium Carb-Cholecalciferol 600-800 MG-UNIT CHEW, Take 2 tablets by mouth, Disp: , Rfl:      carboxymethylcellulose (REFRESH PLUS) 0.5 % SOLN ophthalmic solution, 1 drop, Disp: , Rfl:      cyanocobalamin 1000 MCG SUBL sublingual tablet, Take 1 tablet every other day., Disp: , Rfl:      erythromycin (ROMYCIN) ophthalmic ointment, Apply small amount to incision sites three times daily until tube runs out., Disp: 3.5 g, Rfl: 0     estradiol (ESTRACE) 0.1 MG/GM vaginal cream, , Disp: , Rfl:      lisinopril (ZESTRIL) 20 MG tablet, Take 20 mg by mouth, Disp: , Rfl:      metroNIDAZOLE (METROCREAM) 0.75 % cream, Apply topically  "daily , Disp: , Rfl:      Multiple Vitamins-Minerals (MULTIVITAMIN ADULT) CHEW, Take 2 tablets by mouth every morning , Disp: , Rfl:      omeprazole (PRILOSEC) 20 MG DR capsule, Take 20 mg by mouth, Disp: , Rfl:      polyethylene glycol (MIRALAX/GLYCOLAX) powder, Take 17 g by mouth every morning , Disp: , Rfl:      prochlorperazine (COMPAZINE) 5 MG tablet, Take 5 mg by mouth, Disp: , Rfl:      vitamin D3 (CHOLECALCIFEROL) 125 MCG (5000 UT) tablet, Take 5,000 Units by mouth, Disp: , Rfl:    Surgical History     Past Surgical History  She has a past surgical history that includes Enhance Laser Refractive Bilateral Existing Pt In Parameters (Bilateral, 2001); appendectomy; Cholecystectomy; Abdomen surgery; wisdom teeth extraction; Head and neck surgery; Blepharoplasty bilateral (Bilateral, 7/19/2017); Cholecystectomy; appendectomy; Colonoscopy; Lasik; Orif Zygomatic Fracture; Laparotomy exploratory; Abdominal Adhesion Surgery; Blepharoplasty (07/2017); and Revision Paul-En-Y (12/8/2010).    Objective-Exam     Constitutional:  Ht 1.638 m (5' 4.5\")   Wt 86.6 kg (191 lb)   BMI 32.28 kg/m    General:  Pleasant and in no acute distress     Psychiatric: alert and oriented X3, mood and affect normal    Counseling     We reviewed the important post op bariatric recommendations:  -eating 3 meals daily  -eating protein first, getting >60gm protein daily  -eating slowly, chewing food well  -avoiding/limiting calorie containing beverages  -drinking water 15-30 minutes before or after meals  -choosing wheat, not white with breads, crackers, pastas, janey, bagels, tortillas, rice  -limiting restaurant or cafeteria eating to twice a week or less    We discussed the importance of restorative sleep and stress management in maintaining a healthy weight.  We discussed the National Weight Control Registry healthy weight maintenance strategies and ways to optimize metabolism.  We discussed the importance of physical activity including " cardiovascular and strength training in maintaining a healthier weight.    We discussed the importance of life-long vitamin supplementation and life-long  follow-up.    Corby was reminded that, to avoid marginal ulcers she should avoid tobacco at all, alcohol in excess, caffeine in excess, and NSAIDS (unless indicated for cardioprotection or othewise and opposed by a PPI).    Tamica Giraldo MD, MD, [facility].  5/31/2024  9:43 AM  Total time spent on the date of this encounter doing: chart review, review of test results, patient visit, physical exam, education, counseling, developing plan of care, and documenting = 31 minutes.      Again, thank you for allowing me to participate in the care of your patient.        Sincerely,        Tamica Giraldo MD

## 2024-05-31 NOTE — NURSING NOTE
Is the patient currently in the state of [place]? YES    Visit mode:TELEPHONE    If the visit is dropped, the patient can be reconnected by: TELEPHONE VISIT: Call cell phone:   Telephone Information:   Mobile [phone]       Will anyone else be joining the visit? NO  (If patient encounters technical issues they should call [phone])    How would you like to obtain your AVS? MyChart    Are changes needed to the allergy or medication list? No    Are refills needed on medications prescribed by this physician? YES    Reason for visit: RECHECK    Paz SCHILLING

## 2024-07-13 ENCOUNTER — HEALTH MAINTENANCE LETTER (OUTPATIENT)
Age: 69
End: 2024-07-13

## 2024-11-27 ENCOUNTER — MYC MEDICAL ADVICE (OUTPATIENT)
Dept: SURGERY | Facility: CLINIC | Age: 69
End: 2024-11-27
Payer: MEDICARE

## 2024-11-27 DIAGNOSIS — Z98.84 HISTORY OF ROUX-EN-Y GASTRIC BYPASS: Primary | ICD-10-CM

## 2024-11-27 DIAGNOSIS — R73.09 ELEVATED HEMOGLOBIN A1C: ICD-10-CM

## 2024-11-27 DIAGNOSIS — K90.9 INTESTINAL MALABSORPTION: ICD-10-CM

## 2024-11-27 DIAGNOSIS — K91.2 POSTOPERATIVE MALABSORPTION: ICD-10-CM

## 2024-11-27 NOTE — TELEPHONE ENCOUNTER
Annual 14 yr PO RNY with Sund in 12/13/24.     Pt will go to [facility].     Lyla Pérez  [facility]  Surgery Clinic [facility]  Weight Management Clinic - [address] [address]   Office: [phone]  Fax: [phone]

## 2024-11-30 ENCOUNTER — HEALTH MAINTENANCE LETTER (OUTPATIENT)
Age: 69
End: 2024-11-30

## 2024-12-11 ENCOUNTER — OFFICE VISIT (OUTPATIENT)
Dept: SURGERY | Facility: CLINIC | Age: 69
End: 2024-12-11
Payer: MEDICARE

## 2024-12-11 VITALS
BODY MASS INDEX: 35.22 KG/M2 | SYSTOLIC BLOOD PRESSURE: 120 MMHG | WEIGHT: 211.4 LBS | DIASTOLIC BLOOD PRESSURE: 72 MMHG | HEIGHT: 65 IN

## 2024-12-11 DIAGNOSIS — E11.9 CONTROLLED TYPE 2 DIABETES MELLITUS WITHOUT COMPLICATION, WITHOUT LONG-TERM CURRENT USE OF INSULIN (H): ICD-10-CM

## 2024-12-11 DIAGNOSIS — E66.9 OBESITY (BMI 30-39.9): Primary | ICD-10-CM

## 2024-12-11 PROCEDURE — 99214 OFFICE O/P EST MOD 30 MIN: CPT | Performed by: FAMILY MEDICINE

## 2024-12-11 RX ORDER — LORATADINE 10 MG/1
1 TABLET ORAL DAILY
COMMUNITY

## 2024-12-11 RX ORDER — MELOXICAM 7.5 MG/1
7.5 TABLET ORAL
COMMUNITY
Start: 2024-12-11

## 2024-12-11 RX ORDER — LISINOPRIL 40 MG/1
1 TABLET ORAL
COMMUNITY
Start: 2024-11-15

## 2024-12-11 RX ORDER — HYDROCHLOROTHIAZIDE 12.5 MG/1
12.5 TABLET ORAL DAILY
COMMUNITY

## 2024-12-11 RX ORDER — AMLODIPINE BESYLATE 5 MG/1
1 TABLET ORAL DAILY
COMMUNITY
Start: 2024-12-11

## 2024-12-11 NOTE — LETTER
12/11/2024      Corby Bradley  [address] [place] [address]      Dear Colleague,    Thank you for referring your patient, Corby Bradley, to the [facility] [place]. Please see a copy of my visit note below.    Bariatric Follow Up Visit with a History of Previous Bariatric Surgery     Date of visit: 12/11/2024  Physician: Tamica Giraldo MD, MD  Primary Care Provider:  Provider, Historical  Corby Bradley   69 year old  female    Date of Surgery: 12/8/2010  Initial Weight: 280#  Initial BMI: 48.06  Today's Weight:   Wt Readings from Last 1 Encounters:   12/11/24 95.9 kg (211 lb 6.4 oz)     Body mass index is 35.73 kg/m .      Assessment and Plan     Assessment: Corby is a 69 year old year old female who is 14 yrs s/p  Paul en Y Gastric Bypass with Dr. Ortiz  Corby Bradley feels as if she has achieved the goals she hoped to accomplish through bariatric surgery and weight loss.    Encounter Diagnoses   Name Primary?     Obesity (BMI 30-39.9) Yes     Controlled type 2 diabetes mellitus without complication, without long-term current use of insulin (H)          Current Outpatient Medications:      amitriptyline (ELAVIL) 25 MG tablet, Take 1 tablet by mouth at bedtime., Disp: , Rfl:      amLODIPine (NORVASC) 5 MG tablet, Take 1 tablet by mouth daily., Disp: , Rfl:      atorvastatin (LIPITOR) 40 MG tablet, Take 40 mg by mouth, Disp: , Rfl:      BIOTIN PO, Take 500 mcg by mouth daily , Disp: , Rfl:      Butalbital-APAP-Caffeine -40 MG CAPS, 1-2 tab PO 1-2 times per day PRN migraine, Disp: , Rfl:      Calcium Citrate-Vitamin D 500-10 MG-MCG CHEW, Take 1 tablet by mouth., Disp: , Rfl:      carboxymethylcellulose (REFRESH PLUS) 0.5 % SOLN ophthalmic solution, 1 drop, Disp: , Rfl:      cyanocobalamin 1000 MCG SUBL sublingual tablet, Take 1 tablet every other day., Disp: , Rfl:      estradiol (ESTRACE) 0.1 MG/GM vaginal cream, , Disp: , Rfl:       "hydroCHLOROthiazide 12.5 MG tablet, Take 12.5 mg by mouth daily., Disp: , Rfl:      lisinopril (ZESTRIL) 40 MG tablet, Take 1 tablet by mouth daily at 2 pm., Disp: , Rfl:      loratadine (CLARITIN) 10 MG tablet, Take 1 tablet by mouth daily., Disp: , Rfl:      meloxicam (MOBIC) 7.5 MG tablet, Take 7.5 mg by mouth., Disp: , Rfl:      metFORMIN (GLUCOPHAGE) 500 MG tablet, Take 1 tablet (500 mg) by mouth 2 times daily (with meals), Disp: 180 tablet, Rfl: 3     metroNIDAZOLE (METROCREAM) 0.75 % cream, Apply topically daily , Disp: , Rfl:      Multiple Vitamins-Minerals (MULTIVITAMIN ADULT) CHEW, Take 2 tablets by mouth every morning , Disp: , Rfl:      omeprazole (PRILOSEC) 20 MG DR capsule, Take 20 mg by mouth, Disp: , Rfl:      prochlorperazine (COMPAZINE) 5 MG tablet, Take 5 mg by mouth, Disp: , Rfl:      vitamin D3 (CHOLECALCIFEROL) 125 MCG (5000 UT) tablet, Take 5,000 Units by mouth, Disp: , Rfl:      Plan: RD. Mounjaro Jan 1st. Continue vitamins with consistency. Discussed resistance training and Silver Sneakers is her plan. RD, health coaches and support groups prn.     RD then next available    Bariatric Surgery Review     Interim History/LifeChanges: Had prednisone and a very stressful year and some weight recurrenct. Maintains a 70# weight loss. Her vitamins have been consistent since her surgery. Received a RX for Hima but not covered. Anticipate they will be covered Jan 1 2025 as she will be on her 's insurance.     Patient Concerns: follow up. Ready to get back on track.  Appetite (1-10): up  GERD: on PPI    Medication changes: Mobic given for shoulder tendonitis    Vitamin Intake:   B-12   SL   MVI  2/d   Vitamin D  2/d   Calcium   citrate     Other                LABS: \"Reviewed    Nausea no  Vomiting no  Constipation no  Diarrhea no  Rashes no  Hair Loss no  Calf tenderness no  Breathing difficulty no  Reactive Hypoglycemia yes  Light Headedness sometimes   Moods euthymic    12 point ROS as " above and otherwise negative      Habits:  Alcohol: no  Tobacco: no  Caffeine coffee  NSAIDS new Mobic  Exercise Routine: walking daily. Plans to do Silver Sneakers   3 meals/day B: fruit and protein (chicken) L: same and veggie D: Meatloaf and stringbeans S: veggies and fruit  Protein first yes  60 grams/day  Water Separate from meals yes  Calorie Containing Beverages no  Restaurant eating/wk <2  Sleeping pretty well-7 hours  Stress declining  CPAP: NA  Contraception: PM  DEXA:next year    Social History     Social History     Socioeconomic History     Marital status:      Spouse name: Not on file     Number of children: Not on file     Years of education: Not on file     Highest education level: Not on file   Occupational History     Not on file   Tobacco Use     Smoking status: Former     Smokeless tobacco: Never     Tobacco comments:     Quit in 1986   Substance and Sexual Activity     Alcohol use: Yes     Alcohol/week: 0.0 standard drinks of alcohol     Comment: Alcoholic Drinks/day: Rarely     Drug use: No     Sexual activity: Yes     Partners: Male     Birth control/protection: Other   Other Topics Concern     Not on file   Social History Narrative    . Retired. Has Masters in International Business and worked at [employer]. 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 Has a daughter and  two grandsons.    She speaks Icelandic, Occitan, Serbian, and some Ukrainian. Her father worked for [employer].     Social Drivers of Health     Financial Resource Strain: Low Risk  (10/25/2024)    Received from [facility]    Financial Resource Strain      Difficulty of Paying Living Expenses: 3      Difficulty of Paying Living Expenses: Not on file   Food Insecurity: No Food Insecurity (10/25/2024)    Received from [facility]    Food Insecurity      Do you worry your food will run out before you are able to buy more?: 1    Transportation Needs: No Transportation Needs (10/25/2024)    Received from "[facility]    Transportation Needs      Does lack of transportation keep you from medical appointments?: 1      Does lack of transportation keep you from work, meetings or getting things that you need?: 1   Physical Activity: Not on file   Stress: Not on file   Social Connections: Socially Integrated (10/25/2024)    Received from "[facility]    Social Connections      Do you often feel lonely or isolated from those around you?: 0   Interpersonal Safety: Not on file   Housing Stability: Low Risk  (10/25/2024)    Received from "[facility]    Housing Stability      What is your housing situation today?: 1       Past Medical History     Past Medical History:   Diagnosis Date     Back pain      Controlled type 2 diabetes mellitus without complication, without long-term current use of insulin (H) 01/01/2018     Fibromyalgia      Fibromyalgia, primary      History of anesthesia complications     difficult to wake up/bad headache once     History of Paul-en-Y gastric bypass 01/01/2010     Hyperlipemia      Hypertension      Iron deficiency      Knee pain      Lactose intolerance      Low bone mass 1/2/2023     Migraine      Morbid obesity (H)      Noninfectious ileitis      Postoperative malabsorption      Sleep apnea      Urinary incontinence      Problem List     Patient Active Problem List   Diagnosis     Fibromyalgia, primary     Back pain     Knee pain     Migraine     Obesity (BMI 30-39.9)     History of Paul-en-Y gastric bypass     Postoperative malabsorption     Controlled type 2 diabetes mellitus without complication, without long-term current use of insulin (H)     Hypertension     Hypertriglyceridemia     Irritable bowel syndrome     Lactose intolerance     Lumbar disc disease     Pure hypercholesterolemia     Rosacea     Sleep apnea      Status post gastric bypass for obesity     Morbid obesity (H)     Low bone mass     Medications       Current Outpatient Medications:      amitriptyline (ELAVIL) 25 MG tablet, Take 1 tablet by mouth at bedtime., Disp: , Rfl:      amLODIPine (NORVASC) 5 MG tablet, Take 1 tablet by mouth daily., Disp: , Rfl:      atorvastatin (LIPITOR) 40 MG tablet, Take 40 mg by mouth, Disp: , Rfl:      BIOTIN PO, Take 500 mcg by mouth daily , Disp: , Rfl:      Butalbital-APAP-Caffeine -40 MG CAPS, 1-2 tab PO 1-2 times per day PRN migraine, Disp: , Rfl:      Calcium Citrate-Vitamin D 500-10 MG-MCG CHEW, Take 1 tablet by mouth., Disp: , Rfl:      carboxymethylcellulose (REFRESH PLUS) 0.5 % SOLN ophthalmic solution, 1 drop, Disp: , Rfl:      cyanocobalamin 1000 MCG SUBL sublingual tablet, Take 1 tablet every other day., Disp: , Rfl:      estradiol (ESTRACE) 0.1 MG/GM vaginal cream, , Disp: , Rfl:      hydroCHLOROthiazide 12.5 MG tablet, Take 12.5 mg by mouth daily., Disp: , Rfl:      lisinopril (ZESTRIL) 40 MG tablet, Take 1 tablet by mouth daily at 2 pm., Disp: , Rfl:      loratadine (CLARITIN) 10 MG tablet, Take 1 tablet by mouth daily., Disp: , Rfl:      meloxicam (MOBIC) 7.5 MG tablet, Take 7.5 mg by mouth., Disp: , Rfl:      metFORMIN (GLUCOPHAGE) 500 MG tablet, Take 1 tablet (500 mg) by mouth 2 times daily (with meals), Disp: 180 tablet, Rfl: 3     metroNIDAZOLE (METROCREAM) 0.75 % cream, Apply topically daily , Disp: , Rfl:      Multiple Vitamins-Minerals (MULTIVITAMIN ADULT) CHEW, Take 2 tablets by mouth every morning , Disp: , Rfl:      omeprazole (PRILOSEC) 20 MG DR capsule, Take 20 mg by mouth, Disp: , Rfl:      prochlorperazine (COMPAZINE) 5 MG tablet, Take 5 mg by mouth, Disp: , Rfl:      vitamin D3 (CHOLECALCIFEROL) 125 MCG (5000 UT) tablet, Take 5,000 Units by mouth, Disp: , Rfl:    Surgical History     Past Surgical History  She has a past surgical history that includes Enhance Laser Refractive Bilateral Existing  "Pt In Parameters (Bilateral, 2001); appendectomy; Cholecystectomy; Abdomen surgery; wisdom teeth extraction; Head and neck surgery; Blepharoplasty bilateral (Bilateral, 7/19/2017); Cholecystectomy; appendectomy; Colonoscopy; Lasik; Orif Zygomatic Fracture; Laparotomy exploratory; Abdominal Adhesion Surgery; Blepharoplasty (07/2017); and Revision Paul-En-Y (12/8/2010).    Objective-Exam     Constitutional:  /72 (BP Location: Right arm)   Ht 1.638 m (5' 4.5\")   Wt 95.9 kg (211 lb 6.4 oz)   BMI 35.73 kg/m      General:  Pleasant and in no acute distress   Eyes:  EOMI  ENT:  Airway 1+  Moist mucous membranes  Neck:  Supple, No LAD, No thyromegaly, No carotid bruits appreciated  Respiratory: Normal respiratory effort, no cough, wheezes or crackles  CV:  Regular rate and Rhythm,no murmurs, pulses 2+, no calf tenderness, no LE edema  Gastrointestinal: Abdomen NT/ND, BS+  Musculoskeletal: muscle mass WNL  Skin: color pink hair full, incisions nicely healed  Neurological: No tremor, normal/unassisted gait  Psychiatric: alert and oriented X3, mood and affect normal    Counseling     We reviewed the important post op bariatric recommendations:  -eating 3 meals daily  -eating protein first, getting >60gm protein daily  -eating slowly, chewing food well  -avoiding/limiting calorie containing beverages  -drinking water 15-30 minutes before or after meals  -choosing wheat, not white with breads, crackers, pastas, janey, bagels, tortillas, rice  -limiting restaurant or cafeteria eating to twice a week or less    We discussed the importance of restorative sleep and stress management in maintaining a healthy weight.  We discussed the National Weight Control Registry healthy weight maintenance strategies and ways to optimize metabolism.  We discussed the importance of physical activity including cardiovascular and strength training in maintaining a healthier weight.    We discussed the importance of life-long vitamin " supplementation and life-long  follow-up.    Corby was reminded that, to avoid marginal ulcers she should avoid tobacco at all, alcohol in excess, caffeine in excess, and NSAIDS (unless indicated for cardioprotection or othewise and opposed by a PPI).    Tamica Giraldo MD, MD, [facility].  12/11/2024  2:59 PM      No images are attached to the encounter.  Total time spent on the date of this encounter doing: chart review, review of test results, patient visit, physical exam, education, counseling, developing plan of care, and documenting = 30 minutes.      Again, thank you for allowing me to participate in the care of your patient.        Sincerely,        Tamica Giraldo MD

## 2024-12-11 NOTE — PROGRESS NOTES
Bariatric Follow Up Visit with a History of Previous Bariatric Surgery     Date of visit: 12/11/2024  Physician: Tamica Giraldo MD, MD  Primary Care Provider:  Provider, Historical  Corby Bradley   69 year old  female    Date of Surgery: 12/8/2010  Initial Weight: 280#  Initial BMI: 48.06  Today's Weight:   Wt Readings from Last 1 Encounters:   12/11/24 95.9 kg (211 lb 6.4 oz)     Body mass index is 35.73 kg/m .      Assessment and Plan     Assessment: Corby is a 69 year old year old female who is 14 yrs s/p  Paul en Y Gastric Bypass with Dr. Diana Tavarez ANKIT Bradley feels as if she has achieved the goals she hoped to accomplish through bariatric surgery and weight loss.    Encounter Diagnoses   Name Primary?    Obesity (BMI 30-39.9) Yes    Controlled type 2 diabetes mellitus without complication, without long-term current use of insulin (H)          Current Outpatient Medications:     amitriptyline (ELAVIL) 25 MG tablet, Take 1 tablet by mouth at bedtime., Disp: , Rfl:     amLODIPine (NORVASC) 5 MG tablet, Take 1 tablet by mouth daily., Disp: , Rfl:     atorvastatin (LIPITOR) 40 MG tablet, Take 40 mg by mouth, Disp: , Rfl:     BIOTIN PO, Take 500 mcg by mouth daily , Disp: , Rfl:     Butalbital-APAP-Caffeine -40 MG CAPS, 1-2 tab PO 1-2 times per day PRN migraine, Disp: , Rfl:     Calcium Citrate-Vitamin D 500-10 MG-MCG CHEW, Take 1 tablet by mouth., Disp: , Rfl:     carboxymethylcellulose (REFRESH PLUS) 0.5 % SOLN ophthalmic solution, 1 drop, Disp: , Rfl:     cyanocobalamin 1000 MCG SUBL sublingual tablet, Take 1 tablet every other day., Disp: , Rfl:     estradiol (ESTRACE) 0.1 MG/GM vaginal cream, , Disp: , Rfl:     hydroCHLOROthiazide 12.5 MG tablet, Take 12.5 mg by mouth daily., Disp: , Rfl:     lisinopril (ZESTRIL) 40 MG tablet, Take 1 tablet by mouth daily at 2 pm., Disp: , Rfl:     loratadine (CLARITIN) 10 MG tablet, Take 1 tablet by mouth daily., Disp: , Rfl:     meloxicam (MOBIC) 7.5 MG  "tablet, Take 7.5 mg by mouth., Disp: , Rfl:     metFORMIN (GLUCOPHAGE) 500 MG tablet, Take 1 tablet (500 mg) by mouth 2 times daily (with meals), Disp: 180 tablet, Rfl: 3    metroNIDAZOLE (METROCREAM) 0.75 % cream, Apply topically daily , Disp: , Rfl:     Multiple Vitamins-Minerals (MULTIVITAMIN ADULT) CHEW, Take 2 tablets by mouth every morning , Disp: , Rfl:     omeprazole (PRILOSEC) 20 MG DR capsule, Take 20 mg by mouth, Disp: , Rfl:     prochlorperazine (COMPAZINE) 5 MG tablet, Take 5 mg by mouth, Disp: , Rfl:     vitamin D3 (CHOLECALCIFEROL) 125 MCG (5000 UT) tablet, Take 5,000 Units by mouth, Disp: , Rfl:      Plan: RD. Mounjaro Jan 1st. Continue vitamins with consistency. Discussed resistance training and Silver Sneakers is her plan. RD, health coaches and support groups prn.     RD then next available    Bariatric Surgery Review     Interim History/LifeChanges: Had prednisone and a very stressful year and some weight recurrenct. Maintains a 70# weight loss. Her vitamins have been consistent since her surgery. Received a RX for Mouonjaro but not covered. Anticipate they will be covered Jan 1 2025 as she will be on her 's insurance.     Patient Concerns: follow up. Ready to get back on track.  Appetite (1-10): up  GERD: on PPI    Medication changes: Mobic given for shoulder tendonitis    Vitamin Intake:   B-12   SL   MVI  2/d   Vitamin D  2/d   Calcium   citrate     Other                LABS: \"Reviewed    Nausea no  Vomiting no  Constipation no  Diarrhea no  Rashes no  Hair Loss no  Calf tenderness no  Breathing difficulty no  Reactive Hypoglycemia yes  Light Headedness sometimes   Moods euthymic    12 point ROS as above and otherwise negative      Habits:  Alcohol: no  Tobacco: no  Caffeine coffee  NSAIDS new Mobic  Exercise Routine: walking daily. Plans to do Silver Sneakers   3 meals/day B: fruit and protein (chicken) L: same and veggie D: Meatloaf and stringbeans S: veggies and fruit  Protein first " yes  60 grams/day  Water Separate from meals yes  Calorie Containing Beverages no  Restaurant eating/wk <2  Sleeping pretty well-7 hours  Stress declining  CPAP: NA  Contraception: PM  DEXA:next year    Social History     Social History     Socioeconomic History    Marital status:      Spouse name: Not on file    Number of children: Not on file    Years of education: Not on file    Highest education level: Not on file   Occupational History    Not on file   Tobacco Use    Smoking status: Former    Smokeless tobacco: Never    Tobacco comments:     Quit in 1986   Substance and Sexual Activity    Alcohol use: Yes     Alcohol/week: 0.0 standard drinks of alcohol     Comment: Alcoholic Drinks/day: Rarely    Drug use: No    Sexual activity: Yes     Partners: Male     Birth control/protection: Other   Other Topics Concern    Not on file   Social History Narrative    . Retired. Has Masters in International Business and worked at [employer]. 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 Has a daughter and  two grandsons.    She speaks [place] Islander, Albanian, Portuguese, and some Georgian. Her father worked for [employer].     Social Drivers of Health     Financial Resource Strain: Low Risk  (10/25/2024)    Received from [facility]    Financial Resource Strain     Difficulty of Paying Living Expenses: 3     Difficulty of Paying Living Expenses: Not on file   Food Insecurity: No Food Insecurity (10/25/2024)    Received from [facility]    Food Insecurity     Do you worry your food will run out before you are able to buy more?: 1   Transportation Needs: No Transportation Needs (10/25/2024)    Received from [facility]    Transportation Needs     Does lack of transportation keep you from medical appointments?: 1     Does lack of transportation keep you from work, meetings or getting things that you need?: 1    Physical Activity: Not on file   Stress: Not on file   Social Connections: Socially Integrated (10/25/2024)    Received from [facility]    Social Connections     Do you often feel lonely or isolated from those around you?: 0   Interpersonal Safety: Not on file   Housing Stability: Low Risk  (10/25/2024)    Received from [facility]    Housing Stability     What is your housing situation today?: 1       Past Medical History     Past Medical History:   Diagnosis Date    Back pain     Controlled type 2 diabetes mellitus without complication, without long-term current use of insulin (H) 01/01/2018    Fibromyalgia     Fibromyalgia, primary     History of anesthesia complications     difficult to wake up/bad headache once    History of Paul-en-Y gastric bypass 01/01/2010    Hyperlipemia     Hypertension     Iron deficiency     Knee pain     Lactose intolerance     Low bone mass 1/2/2023    Migraine     Morbid obesity (H)     Noninfectious ileitis     Postoperative malabsorption     Sleep apnea     Urinary incontinence      Problem List     Patient Active Problem List   Diagnosis    Fibromyalgia, primary    Back pain    Knee pain    Migraine    Obesity (BMI 30-39.9)    History of Paul-en-Y gastric bypass    Postoperative malabsorption    Controlled type 2 diabetes mellitus without complication, without long-term current use of insulin (H)    Hypertension    Hypertriglyceridemia    Irritable bowel syndrome    Lactose intolerance    Lumbar disc disease    Pure hypercholesterolemia    Rosacea    Sleep apnea    Status post gastric bypass for obesity    Morbid obesity (H)    Low bone mass     Medications       Current Outpatient Medications:     amitriptyline (ELAVIL) 25 MG tablet, Take 1 tablet by mouth at bedtime., Disp: , Rfl:     amLODIPine (NORVASC) 5 MG tablet, Take 1 tablet by mouth daily., Disp: , Rfl:     atorvastatin (LIPITOR) 40 MG tablet, Take 40  mg by mouth, Disp: , Rfl:     BIOTIN PO, Take 500 mcg by mouth daily , Disp: , Rfl:     Butalbital-APAP-Caffeine -40 MG CAPS, 1-2 tab PO 1-2 times per day PRN migraine, Disp: , Rfl:     Calcium Citrate-Vitamin D 500-10 MG-MCG CHEW, Take 1 tablet by mouth., Disp: , Rfl:     carboxymethylcellulose (REFRESH PLUS) 0.5 % SOLN ophthalmic solution, 1 drop, Disp: , Rfl:     cyanocobalamin 1000 MCG SUBL sublingual tablet, Take 1 tablet every other day., Disp: , Rfl:     estradiol (ESTRACE) 0.1 MG/GM vaginal cream, , Disp: , Rfl:     hydroCHLOROthiazide 12.5 MG tablet, Take 12.5 mg by mouth daily., Disp: , Rfl:     lisinopril (ZESTRIL) 40 MG tablet, Take 1 tablet by mouth daily at 2 pm., Disp: , Rfl:     loratadine (CLARITIN) 10 MG tablet, Take 1 tablet by mouth daily., Disp: , Rfl:     meloxicam (MOBIC) 7.5 MG tablet, Take 7.5 mg by mouth., Disp: , Rfl:     metFORMIN (GLUCOPHAGE) 500 MG tablet, Take 1 tablet (500 mg) by mouth 2 times daily (with meals), Disp: 180 tablet, Rfl: 3    metroNIDAZOLE (METROCREAM) 0.75 % cream, Apply topically daily , Disp: , Rfl:     Multiple Vitamins-Minerals (MULTIVITAMIN ADULT) CHEW, Take 2 tablets by mouth every morning , Disp: , Rfl:     omeprazole (PRILOSEC) 20 MG DR capsule, Take 20 mg by mouth, Disp: , Rfl:     prochlorperazine (COMPAZINE) 5 MG tablet, Take 5 mg by mouth, Disp: , Rfl:     vitamin D3 (CHOLECALCIFEROL) 125 MCG (5000 UT) tablet, Take 5,000 Units by mouth, Disp: , Rfl:    Surgical History     Past Surgical History  She has a past surgical history that includes Enhance Laser Refractive Bilateral Existing Pt In Parameters (Bilateral, 2001); appendectomy; Cholecystectomy; Abdomen surgery; wisdom teeth extraction; Head and neck surgery; Blepharoplasty bilateral (Bilateral, 7/19/2017); Cholecystectomy; appendectomy; Colonoscopy; Lasik; Orif Zygomatic Fracture; Laparotomy exploratory; Abdominal Adhesion Surgery; Blepharoplasty (07/2017); and Revision Paul-En-Y  "(12/8/2010).    Objective-Exam     Constitutional:  /72 (BP Location: Right arm)   Ht 1.638 m (5' 4.5\")   Wt 95.9 kg (211 lb 6.4 oz)   BMI 35.73 kg/m      General:  Pleasant and in no acute distress   Eyes:  EOMI  ENT:  Airway 1+  Moist mucous membranes  Neck:  Supple, No LAD, No thyromegaly, No carotid bruits appreciated  Respiratory: Normal respiratory effort, no cough, wheezes or crackles  CV:  Regular rate and Rhythm,no murmurs, pulses 2+, no calf tenderness, no LE edema  Gastrointestinal: Abdomen NT/ND, BS+  Musculoskeletal: muscle mass WNL  Skin: color pink hair full, incisions nicely healed  Neurological: No tremor, normal/unassisted gait  Psychiatric: alert and oriented X3, mood and affect normal    Counseling     We reviewed the important post op bariatric recommendations:  -eating 3 meals daily  -eating protein first, getting >60gm protein daily  -eating slowly, chewing food well  -avoiding/limiting calorie containing beverages  -drinking water 15-30 minutes before or after meals  -choosing wheat, not white with breads, crackers, pastas, janey, bagels, tortillas, rice  -limiting restaurant or cafeteria eating to twice a week or less    We discussed the importance of restorative sleep and stress management in maintaining a healthy weight.  We discussed the National Weight Control Registry healthy weight maintenance strategies and ways to optimize metabolism.  We discussed the importance of physical activity including cardiovascular and strength training in maintaining a healthier weight.    We discussed the importance of life-long vitamin supplementation and life-long  follow-up.    Corby was reminded that, to avoid marginal ulcers she should avoid tobacco at all, alcohol in excess, caffeine in excess, and NSAIDS (unless indicated for cardioprotection or othewise and opposed by a PPI).    Tamica Giraldo MD, MD, FAAFP  [facility].  12/11/2024  2:59 PM      No images " are attached to the encounter.  Total time spent on the date of this encounter doing: chart review, review of test results, patient visit, physical exam, education, counseling, developing plan of care, and documenting = 30 minutes.

## 2025-03-09 ENCOUNTER — HEALTH MAINTENANCE LETTER (OUTPATIENT)
Age: 70
End: 2025-03-09

## 2025-06-28 ENCOUNTER — HEALTH MAINTENANCE LETTER (OUTPATIENT)
Age: 70
End: 2025-06-28

## (undated) DEVICE — SOL WATER IRRIG 500ML BOTTLE 2F7113

## (undated) DEVICE — PACK MINOR EYE CUSTOM ASC

## (undated) DEVICE — NDL 30GA 0.5" 305106

## (undated) DEVICE — BLADE KNIFE SURG 15 371115

## (undated) DEVICE — SU ETHILON 10-0 TG-160-4DA 12" 7707G

## (undated) DEVICE — ESU EYE HIGH TEMP 65410-183

## (undated) DEVICE — GLOVE PROTEXIS MICRO 7.5  2D73PM75

## (undated) DEVICE — SU VICRYL 5-0 P-3 18" UND J493G

## (undated) DEVICE — SYR 03ML LL W/O NDL 309657

## (undated) DEVICE — EYE PREP BETADINE 5% SOLUTION 30ML 0065-0411-30

## (undated) DEVICE — LINEN TOWEL PACK X5 5464

## (undated) DEVICE — PEN MARKING SKIN VISIMARK 1424SR

## (undated) RX ORDER — ACETAMINOPHEN 325 MG/1
TABLET ORAL
Status: DISPENSED
Start: 2017-07-19